# Patient Record
Sex: FEMALE
[De-identification: names, ages, dates, MRNs, and addresses within clinical notes are randomized per-mention and may not be internally consistent; named-entity substitution may affect disease eponyms.]

---

## 2019-02-03 ENCOUNTER — TRANSCRIPTION ENCOUNTER (OUTPATIENT)
Age: 69
End: 2019-02-03

## 2019-09-05 ENCOUNTER — TRANSCRIPTION ENCOUNTER (OUTPATIENT)
Age: 69
End: 2019-09-05

## 2019-10-21 PROBLEM — Z00.00 ENCOUNTER FOR PREVENTIVE HEALTH EXAMINATION: Status: ACTIVE | Noted: 2019-10-21

## 2019-10-23 ENCOUNTER — APPOINTMENT (OUTPATIENT)
Dept: UROLOGY | Facility: CLINIC | Age: 69
End: 2019-10-23
Payer: MEDICARE

## 2019-10-23 VITALS
OXYGEN SATURATION: 98 % | WEIGHT: 186 LBS | HEIGHT: 59 IN | DIASTOLIC BLOOD PRESSURE: 78 MMHG | RESPIRATION RATE: 16 BRPM | SYSTOLIC BLOOD PRESSURE: 131 MMHG | BODY MASS INDEX: 37.5 KG/M2 | HEART RATE: 82 BPM

## 2019-10-23 DIAGNOSIS — G47.33 OBSTRUCTIVE SLEEP APNEA (ADULT) (PEDIATRIC): ICD-10-CM

## 2019-10-23 DIAGNOSIS — F41.9 ANXIETY DISORDER, UNSPECIFIED: ICD-10-CM

## 2019-10-23 DIAGNOSIS — Z82.49 FAMILY HISTORY OF ISCHEMIC HEART DISEASE AND OTHER DISEASES OF THE CIRCULATORY SYSTEM: ICD-10-CM

## 2019-10-23 DIAGNOSIS — Z87.891 PERSONAL HISTORY OF NICOTINE DEPENDENCE: ICD-10-CM

## 2019-10-23 DIAGNOSIS — I25.2 OLD MYOCARDIAL INFARCTION: ICD-10-CM

## 2019-10-23 DIAGNOSIS — Z86.79 PERSONAL HISTORY OF OTHER DISEASES OF THE CIRCULATORY SYSTEM: ICD-10-CM

## 2019-10-23 DIAGNOSIS — N39.0 URINARY TRACT INFECTION, SITE NOT SPECIFIED: ICD-10-CM

## 2019-10-23 DIAGNOSIS — Z86.39 PERSONAL HISTORY OF OTHER ENDOCRINE, NUTRITIONAL AND METABOLIC DISEASE: ICD-10-CM

## 2019-10-23 DIAGNOSIS — F32.9 ANXIETY DISORDER, UNSPECIFIED: ICD-10-CM

## 2019-10-23 PROCEDURE — 81003 URINALYSIS AUTO W/O SCOPE: CPT | Mod: QW

## 2019-10-23 PROCEDURE — 99204 OFFICE O/P NEW MOD 45 MIN: CPT | Mod: 25

## 2019-10-23 RX ORDER — NITROFURANTOIN (MONOHYDRATE/MACROCRYSTALS) 25; 75 MG/1; MG/1
100 CAPSULE ORAL TWICE DAILY
Qty: 14 | Refills: 0 | Status: ACTIVE | COMMUNITY
Start: 2019-10-23 | End: 1900-01-01

## 2019-10-23 NOTE — LETTER BODY
[Dear  ___] : Dear ~MIKAELA, [Consult Letter:] : I had the pleasure of evaluating your patient, [unfilled]. [( Thank you for referring [unfilled] for consultation for _____ )] : Thank you for referring [unfilled] for consultation for [unfilled] [Please see my note below.] : Please see my note below. [Consult Closing:] : Thank you very much for allowing me to participate in the care of this patient.  If you have any questions, please do not hesitate to contact me. [Sincerely,] : Sincerely,

## 2019-10-23 NOTE — PHYSICAL EXAM
[General Appearance - Well Developed] : well developed [General Appearance - Well Nourished] : well nourished [Normal Appearance] : normal appearance [Well Groomed] : well groomed [General Appearance - In No Acute Distress] : no acute distress [Edema] : no peripheral edema [Respiration, Rhythm And Depth] : normal respiratory rhythm and effort [Exaggerated Use Of Accessory Muscles For Inspiration] : no accessory muscle use [Auscultation Breath Sounds / Voice Sounds] : lungs were clear to auscultation bilaterally [Bowel Sounds] : normal bowel sounds [Abdomen Soft] : soft [Abdomen Tenderness] : non-tender [Abdomen Mass (___ Cm)] : no abdominal mass palpated [Costovertebral Angle Tenderness] : no ~M costovertebral angle tenderness [Urethral Meatus] : normal urethra [External Female Genitalia] : normal external genitalia [Urinary Bladder Findings] : the bladder was normal on palpation [Uterine Adnexae] : normal adnexa [Anus Abnormality] : the anus and perineum were normal [Normal Station and Gait] : the gait and station were normal for the patient's age [No Focal Deficits] : no focal deficits [] : no rash [Oriented To Time, Place, And Person] : oriented to person, place, and time [Affect] : the affect was normal [Mood] : the mood was normal [No Palpable Adenopathy] : no palpable adenopathy [Not Anxious] : not anxious [FreeTextEntry1] : the vagina was stenotic with pale mucosa and loss of rugae ;a grade 1 cystourethrocele was present.

## 2019-10-23 NOTE — ASSESSMENT
[FreeTextEntry1] : #1) pyuria-asymptomatic but patient has diabetes type 2\par Today's dipstick urinalysis showed moderate leukocyte esterase. It was sent to laboratory for microscopic exam and culture.\par She was treated with Macrobid b.i.d. for one week.\par \par #2) enuresis\par This is most likely multifactorial.\par She has diabetes type 2, obstructive sleep apnea, sedated at night with Xanax and may have a UTI that aggravating.\par \par Once it is known that the urine is sterile, a PVR/uroflow study will be done to determine if she has a large post void residual which can lead to overflow incontinence at night in diabetics. This may be aggravated by her GALE and sedation at bedtime with Xanax.\par Further recommendations forthcoming after the PVR/uroflow study.\par \par #3) stress urinary incontinence: Tolerable and not a problem.\par \par She will return in 10 days for reevaluation.\par

## 2019-10-23 NOTE — REVIEW OF SYSTEMS
[Recent Weight Loss (___ Lbs)] : recent [unfilled] ~Ulb weight loss [Eyesight Problems] : eyesight problems [Vomiting] : vomiting [Constipation] : constipation [Diarrhea] : diarrhea [Joint Pain] : joint pain [Skin Lesions] : skin lesion [Itching] : itching [Difficulty Walking] : difficulty walking [Easy Bleeding] : a tendency for easy bleeding [Easy Bruising] : a tendency for easy bruising [Swollen Glands] : swollen glands

## 2019-10-23 NOTE — HISTORY OF PRESENT ILLNESS
[FreeTextEntry1] : The patient is a 69-year-old woman who was seen in the office today for the above. She stated that she does not wake up in the middle of the night to urinate but wakes up in the morning completely saturated. This has been occurring for the last 12 years. She uses a heavy pad and a pullup both daytime and nighttime twice a day. She soaks through this protection about twice a week. She does take Xanax at midnight and falls asleep about 1 AM. She also has sleep apnea but is unable to tolerate the CPAP machine. She sleeps in a recliner because she enjoys watching late-night TV and does not want to disturb her . Her daytime frequency is 5 times a day and she denies all other urological and constitutional symptomatology except for stress urinary incontinence initiate spells about a teaspoon of urine with coughing, laughing with sneezing. This started about 15 years ago and is not a problem for her. She drinks either 6 ounces of water or 8 ounces of soda is decaffeinated after dinner and has about 4 ounces of sugar-free putting each night.She has had type 2 diabetes for about 6 years.\par She saw her urologist for this problem about 10 years ago and said he prescribed medication that gave her dry mouth. She only was able to take one tablet and then stopped it. She never returned for followup.\par \par Past Urological History:\par A few UTIs\par \par Urological Family History:\par Maternal aunt? Bladder cancer\par

## 2019-10-24 LAB
APPEARANCE: ABNORMAL
BACTERIA: ABNORMAL
BILIRUB UR QL STRIP: NORMAL
BILIRUBIN URINE: NEGATIVE
BLOOD URINE: ABNORMAL
CALCIUM OXALATE CRYSTALS: ABNORMAL
CLARITY UR: NORMAL
COLLECTION METHOD: NORMAL
COLOR: YELLOW
GLUCOSE QUALITATIVE U: NEGATIVE
GLUCOSE UR-MCNC: NORMAL
HCG UR QL: 0.2 EU/DL
HGB UR QL STRIP.AUTO: NORMAL
HYALINE CASTS: 6 /LPF
KETONES UR-MCNC: NORMAL
KETONES URINE: NEGATIVE
LEUKOCYTE ESTERASE UR QL STRIP: NORMAL
LEUKOCYTE ESTERASE URINE: ABNORMAL
MICROSCOPIC-UA: NORMAL
NITRITE UR QL STRIP: NORMAL
NITRITE URINE: NEGATIVE
PH UR STRIP: 5.5
PH URINE: 5.5
PROT UR STRIP-MCNC: 100
PROTEIN URINE: ABNORMAL
RED BLOOD CELLS URINE: 18 /HPF
SP GR UR STRIP: 1.02
SPECIFIC GRAVITY URINE: 1.02
SQUAMOUS EPITHELIAL CELLS: 5 /HPF
UROBILINOGEN URINE: NORMAL
WHITE BLOOD CELLS URINE: >720 /HPF

## 2019-10-27 LAB — BACTERIA UR CULT: ABNORMAL

## 2019-11-20 ENCOUNTER — APPOINTMENT (OUTPATIENT)
Dept: UROLOGY | Facility: CLINIC | Age: 69
End: 2019-11-20
Payer: MEDICARE

## 2019-11-20 VITALS
OXYGEN SATURATION: 96 % | HEART RATE: 69 BPM | RESPIRATION RATE: 16 BRPM | DIASTOLIC BLOOD PRESSURE: 79 MMHG | SYSTOLIC BLOOD PRESSURE: 149 MMHG

## 2019-11-20 DIAGNOSIS — N39.3 STRESS INCONTINENCE (FEMALE) (MALE): ICD-10-CM

## 2019-11-20 DIAGNOSIS — N39.0 URINARY TRACT INFECTION, SITE NOT SPECIFIED: ICD-10-CM

## 2019-11-20 DIAGNOSIS — B96.20 URINARY TRACT INFECTION, SITE NOT SPECIFIED: ICD-10-CM

## 2019-11-20 PROCEDURE — 99214 OFFICE O/P EST MOD 30 MIN: CPT

## 2019-11-20 NOTE — ASSESSMENT
[FreeTextEntry1] : #1) pyuria-asymptomatic but patient has diabetes type 2\par Today's dipstick urinalysis showed moderate leukocyte esterase. It was sent to laboratory for microscopic exam and culture.\par 10/23/19 urine culture greater than 100,000 colonies of Escherichia coli sensitive to Macrobid.\par Microscopic urinalysis and urine culture sent today\par #2) enuresis\par This is most likely multifactorial.\par She has diabetes type 2, obstructive sleep apnea, sedated at night with Xanax and may have a UTI that aggravating.\par PVR/uroflow study will be done to determine if she has a large post void residual which can lead to overflow incontinence at night in diabetics. This may be aggravated by her GALE and sedation at bedtime with Xanax.\par Further recommendations forthcoming after the PVR/uroflow study.\par \par #3) stress urinary incontinence: Tolerable and not a problem.\par \par \par

## 2019-11-20 NOTE — PHYSICAL EXAM
[General Appearance - Well Developed] : well developed [General Appearance - Well Nourished] : well nourished [Normal Appearance] : normal appearance [Well Groomed] : well groomed [General Appearance - In No Acute Distress] : no acute distress [Bowel Sounds] : normal bowel sounds [Abdomen Soft] : soft [Abdomen Tenderness] : non-tender [Abdomen Mass (___ Cm)] : no abdominal mass palpated [Costovertebral Angle Tenderness] : no ~M costovertebral angle tenderness [] : no rash [Edema] : no peripheral edema [Oriented To Time, Place, And Person] : oriented to person, place, and time [Affect] : the affect was normal [Mood] : the mood was normal [Not Anxious] : not anxious [Normal Station and Gait] : the gait and station were normal for the patient's age [No Focal Deficits] : no focal deficits [FreeTextEntry1] : Morbidly obese

## 2020-01-01 ENCOUNTER — APPOINTMENT (OUTPATIENT)
Dept: UROLOGY | Facility: CLINIC | Age: 70
End: 2020-01-01
Payer: MEDICARE

## 2020-01-01 ENCOUNTER — INPATIENT (INPATIENT)
Facility: HOSPITAL | Age: 70
LOS: 9 days | DRG: 870 | End: 2020-12-22
Attending: HOSPITALIST | Admitting: HOSPITALIST
Payer: MEDICARE

## 2020-01-01 ENCOUNTER — APPOINTMENT (OUTPATIENT)
Dept: UROLOGY | Facility: CLINIC | Age: 70
End: 2020-01-01

## 2020-01-01 VITALS — WEIGHT: 199.96 LBS | HEIGHT: 63 IN

## 2020-01-01 VITALS — DIASTOLIC BLOOD PRESSURE: 80 MMHG | RESPIRATION RATE: 28 BRPM | HEART RATE: 71 BPM | SYSTOLIC BLOOD PRESSURE: 96 MMHG

## 2020-01-01 DIAGNOSIS — N39.44 NOCTURNAL ENURESIS: ICD-10-CM

## 2020-01-01 DIAGNOSIS — J12.9 VIRAL PNEUMONIA, UNSPECIFIED: ICD-10-CM

## 2020-01-01 LAB
-  AMIKACIN: SIGNIFICANT CHANGE UP
-  AMOXICILLIN/CLAVULANIC ACID: SIGNIFICANT CHANGE UP
-  AMPICILLIN/SULBACTAM: SIGNIFICANT CHANGE UP
-  AMPICILLIN/SULBACTAM: SIGNIFICANT CHANGE UP
-  AMPICILLIN: SIGNIFICANT CHANGE UP
-  AZTREONAM: SIGNIFICANT CHANGE UP
-  CEFAZOLIN: SIGNIFICANT CHANGE UP
-  CEFAZOLIN: SIGNIFICANT CHANGE UP
-  CEFEPIME: SIGNIFICANT CHANGE UP
-  CEFOXITIN: SIGNIFICANT CHANGE UP
-  CEFTRIAXONE: SIGNIFICANT CHANGE UP
-  CIPROFLOXACIN: SIGNIFICANT CHANGE UP
-  CLINDAMYCIN: SIGNIFICANT CHANGE UP
-  COAGULASE NEGATIVE STAPHYLOCOCCUS: SIGNIFICANT CHANGE UP
-  ERTAPENEM: SIGNIFICANT CHANGE UP
-  ERYTHROMYCIN: SIGNIFICANT CHANGE UP
-  GENTAMICIN: SIGNIFICANT CHANGE UP
-  GENTAMICIN: SIGNIFICANT CHANGE UP
-  IMIPENEM: SIGNIFICANT CHANGE UP
-  LEVOFLOXACIN: SIGNIFICANT CHANGE UP
-  MEROPENEM: SIGNIFICANT CHANGE UP
-  NITROFURANTOIN: SIGNIFICANT CHANGE UP
-  OXACILLIN: SIGNIFICANT CHANGE UP
-  PIPERACILLIN/TAZOBACTAM: SIGNIFICANT CHANGE UP
-  RIFAMPIN: SIGNIFICANT CHANGE UP
-  TETRACYCLINE: SIGNIFICANT CHANGE UP
-  TIGECYCLINE: SIGNIFICANT CHANGE UP
-  TOBRAMYCIN: SIGNIFICANT CHANGE UP
-  TRIMETHOPRIM/SULFAMETHOXAZOLE: SIGNIFICANT CHANGE UP
-  TRIMETHOPRIM/SULFAMETHOXAZOLE: SIGNIFICANT CHANGE UP
-  VANCOMYCIN: SIGNIFICANT CHANGE UP
A1C WITH ESTIMATED AVERAGE GLUCOSE RESULT: 8.8 % — HIGH (ref 4–5.6)
ALBUMIN SERPL ELPH-MCNC: 1.5 G/DL — LOW (ref 3.3–5)
ALBUMIN SERPL ELPH-MCNC: 2.5 G/DL — LOW (ref 3.3–5)
ALBUMIN SERPL ELPH-MCNC: 2.6 G/DL — LOW (ref 3.3–5)
ALBUMIN SERPL ELPH-MCNC: 3.1 G/DL — LOW (ref 3.3–5)
ALP SERPL-CCNC: 119 U/L — SIGNIFICANT CHANGE UP (ref 40–120)
ALP SERPL-CCNC: 24 U/L — LOW (ref 40–120)
ALP SERPL-CCNC: 29 U/L — LOW (ref 40–120)
ALP SERPL-CCNC: 33 U/L — LOW (ref 40–120)
ALT FLD-CCNC: 28 U/L — SIGNIFICANT CHANGE UP (ref 12–78)
ALT FLD-CCNC: 29 U/L — SIGNIFICANT CHANGE UP (ref 12–78)
ALT FLD-CCNC: 34 U/L — SIGNIFICANT CHANGE UP (ref 12–78)
ALT FLD-CCNC: 69 U/L — SIGNIFICANT CHANGE UP (ref 12–78)
ANION GAP SERPL CALC-SCNC: 11 MMOL/L — SIGNIFICANT CHANGE UP (ref 5–17)
ANION GAP SERPL CALC-SCNC: 14 MMOL/L — SIGNIFICANT CHANGE UP (ref 5–17)
ANION GAP SERPL CALC-SCNC: 18 MMOL/L — HIGH (ref 5–17)
ANION GAP SERPL CALC-SCNC: 6 MMOL/L — SIGNIFICANT CHANGE UP (ref 5–17)
ANION GAP SERPL CALC-SCNC: 7 MMOL/L — SIGNIFICANT CHANGE UP (ref 5–17)
ANION GAP SERPL CALC-SCNC: 8 MMOL/L — SIGNIFICANT CHANGE UP (ref 5–17)
ANION GAP SERPL CALC-SCNC: 9 MMOL/L — SIGNIFICANT CHANGE UP (ref 5–17)
APPEARANCE UR: ABNORMAL
APTT BLD: 31.1 SEC — SIGNIFICANT CHANGE UP (ref 27.5–35.5)
AST SERPL-CCNC: 36 U/L — SIGNIFICANT CHANGE UP (ref 15–37)
AST SERPL-CCNC: 39 U/L — HIGH (ref 15–37)
AST SERPL-CCNC: 42 U/L — HIGH (ref 15–37)
AST SERPL-CCNC: 71 U/L — HIGH (ref 15–37)
BASE EXCESS BLDA CALC-SCNC: -0.6 MMOL/L — SIGNIFICANT CHANGE UP (ref -2–2)
BASE EXCESS BLDA CALC-SCNC: -1.5 MMOL/L — SIGNIFICANT CHANGE UP (ref -2–2)
BASE EXCESS BLDA CALC-SCNC: -14.1 MMOL/L — LOW (ref -2–2)
BASE EXCESS BLDA CALC-SCNC: -2.4 MMOL/L — LOW (ref -2–2)
BASE EXCESS BLDA CALC-SCNC: -5.5 MMOL/L — LOW (ref -2–2)
BASE EXCESS BLDA CALC-SCNC: 0.1 MMOL/L — SIGNIFICANT CHANGE UP (ref -2–2)
BASE EXCESS BLDA CALC-SCNC: 1 MMOL/L — SIGNIFICANT CHANGE UP (ref -2–2)
BASE EXCESS BLDA CALC-SCNC: 1.9 MMOL/L — SIGNIFICANT CHANGE UP (ref -2–2)
BASE EXCESS BLDA CALC-SCNC: 1.9 MMOL/L — SIGNIFICANT CHANGE UP (ref -2–2)
BASE EXCESS BLDA CALC-SCNC: 2 MMOL/L — SIGNIFICANT CHANGE UP (ref -2–2)
BASE EXCESS BLDA CALC-SCNC: 2.8 MMOL/L — HIGH (ref -2–2)
BASOPHILS # BLD AUTO: 0.02 K/UL — SIGNIFICANT CHANGE UP (ref 0–0.2)
BASOPHILS NFR BLD AUTO: 0.3 % — SIGNIFICANT CHANGE UP (ref 0–2)
BILIRUB DIRECT SERPL-MCNC: 0.1 MG/DL — SIGNIFICANT CHANGE UP (ref 0–0.2)
BILIRUB DIRECT SERPL-MCNC: 0.6 MG/DL — HIGH (ref 0–0.2)
BILIRUB INDIRECT FLD-MCNC: 0.1 MG/DL — LOW (ref 0.2–1)
BILIRUB INDIRECT FLD-MCNC: 0.3 MG/DL — SIGNIFICANT CHANGE UP (ref 0.2–1)
BILIRUB SERPL-MCNC: 0.4 MG/DL — SIGNIFICANT CHANGE UP (ref 0.2–1.2)
BILIRUB SERPL-MCNC: 0.4 MG/DL — SIGNIFICANT CHANGE UP (ref 0.2–1.2)
BILIRUB SERPL-MCNC: 0.6 MG/DL — SIGNIFICANT CHANGE UP (ref 0.2–1.2)
BILIRUB SERPL-MCNC: 0.7 MG/DL — SIGNIFICANT CHANGE UP (ref 0.2–1.2)
BILIRUB UR-MCNC: NEGATIVE — SIGNIFICANT CHANGE UP
BLOOD GAS COMMENTS ARTERIAL: SIGNIFICANT CHANGE UP
BUN SERPL-MCNC: 113 MG/DL — HIGH (ref 7–23)
BUN SERPL-MCNC: 15 MG/DL — SIGNIFICANT CHANGE UP (ref 7–23)
BUN SERPL-MCNC: 15 MG/DL — SIGNIFICANT CHANGE UP (ref 7–23)
BUN SERPL-MCNC: 20 MG/DL — SIGNIFICANT CHANGE UP (ref 7–23)
BUN SERPL-MCNC: 28 MG/DL — HIGH (ref 7–23)
BUN SERPL-MCNC: 41 MG/DL — HIGH (ref 7–23)
BUN SERPL-MCNC: 42 MG/DL — HIGH (ref 7–23)
BUN SERPL-MCNC: 53 MG/DL — HIGH (ref 7–23)
BUN SERPL-MCNC: 82 MG/DL — HIGH (ref 7–23)
BUN SERPL-MCNC: 87 MG/DL — HIGH (ref 7–23)
BUN SERPL-MCNC: 97 MG/DL — HIGH (ref 7–23)
CALCIUM SERPL-MCNC: 7.9 MG/DL — LOW (ref 8.5–10.1)
CALCIUM SERPL-MCNC: 8.2 MG/DL — LOW (ref 8.5–10.1)
CALCIUM SERPL-MCNC: 8.2 MG/DL — LOW (ref 8.5–10.1)
CALCIUM SERPL-MCNC: 8.3 MG/DL — LOW (ref 8.5–10.1)
CALCIUM SERPL-MCNC: 8.4 MG/DL — LOW (ref 8.5–10.1)
CALCIUM SERPL-MCNC: 8.5 MG/DL — SIGNIFICANT CHANGE UP (ref 8.5–10.1)
CALCIUM SERPL-MCNC: 8.6 MG/DL — SIGNIFICANT CHANGE UP (ref 8.5–10.1)
CALCIUM SERPL-MCNC: 8.7 MG/DL — SIGNIFICANT CHANGE UP (ref 8.5–10.1)
CALCIUM SERPL-MCNC: 8.8 MG/DL — SIGNIFICANT CHANGE UP (ref 8.5–10.1)
CALCIUM SERPL-MCNC: 8.9 MG/DL — SIGNIFICANT CHANGE UP (ref 8.5–10.1)
CALCIUM SERPL-MCNC: 9 MG/DL — SIGNIFICANT CHANGE UP (ref 8.5–10.1)
CHLORIDE SERPL-SCNC: 100 MMOL/L — SIGNIFICANT CHANGE UP (ref 96–108)
CHLORIDE SERPL-SCNC: 102 MMOL/L — SIGNIFICANT CHANGE UP (ref 96–108)
CHLORIDE SERPL-SCNC: 102 MMOL/L — SIGNIFICANT CHANGE UP (ref 96–108)
CHLORIDE SERPL-SCNC: 103 MMOL/L — SIGNIFICANT CHANGE UP (ref 96–108)
CHLORIDE SERPL-SCNC: 103 MMOL/L — SIGNIFICANT CHANGE UP (ref 96–108)
CHLORIDE SERPL-SCNC: 104 MMOL/L — SIGNIFICANT CHANGE UP (ref 96–108)
CHLORIDE SERPL-SCNC: 106 MMOL/L — SIGNIFICANT CHANGE UP (ref 96–108)
CHLORIDE SERPL-SCNC: 91 MMOL/L — LOW (ref 96–108)
CHLORIDE SERPL-SCNC: 91 MMOL/L — LOW (ref 96–108)
CHLORIDE SERPL-SCNC: 96 MMOL/L — SIGNIFICANT CHANGE UP (ref 96–108)
CHLORIDE SERPL-SCNC: 96 MMOL/L — SIGNIFICANT CHANGE UP (ref 96–108)
CO2 SERPL-SCNC: 16 MMOL/L — LOW (ref 22–31)
CO2 SERPL-SCNC: 24 MMOL/L — SIGNIFICANT CHANGE UP (ref 22–31)
CO2 SERPL-SCNC: 26 MMOL/L — SIGNIFICANT CHANGE UP (ref 22–31)
CO2 SERPL-SCNC: 27 MMOL/L — SIGNIFICANT CHANGE UP (ref 22–31)
CO2 SERPL-SCNC: 28 MMOL/L — SIGNIFICANT CHANGE UP (ref 22–31)
CO2 SERPL-SCNC: 29 MMOL/L — SIGNIFICANT CHANGE UP (ref 22–31)
CO2 SERPL-SCNC: 30 MMOL/L — SIGNIFICANT CHANGE UP (ref 22–31)
CO2 SERPL-SCNC: 31 MMOL/L — SIGNIFICANT CHANGE UP (ref 22–31)
COLOR SPEC: YELLOW — SIGNIFICANT CHANGE UP
CREAT SERPL-MCNC: 0.64 MG/DL — SIGNIFICANT CHANGE UP (ref 0.5–1.3)
CREAT SERPL-MCNC: 0.69 MG/DL — SIGNIFICANT CHANGE UP (ref 0.5–1.3)
CREAT SERPL-MCNC: 0.77 MG/DL — SIGNIFICANT CHANGE UP (ref 0.5–1.3)
CREAT SERPL-MCNC: 0.85 MG/DL — SIGNIFICANT CHANGE UP (ref 0.5–1.3)
CREAT SERPL-MCNC: 0.89 MG/DL — SIGNIFICANT CHANGE UP (ref 0.5–1.3)
CREAT SERPL-MCNC: 0.96 MG/DL — SIGNIFICANT CHANGE UP (ref 0.5–1.3)
CREAT SERPL-MCNC: 0.98 MG/DL — SIGNIFICANT CHANGE UP (ref 0.5–1.3)
CREAT SERPL-MCNC: 0.99 MG/DL — SIGNIFICANT CHANGE UP (ref 0.5–1.3)
CREAT SERPL-MCNC: 1.89 MG/DL — HIGH (ref 0.5–1.3)
CREAT SERPL-MCNC: 2.74 MG/DL — HIGH (ref 0.5–1.3)
CREAT SERPL-MCNC: 3.93 MG/DL — HIGH (ref 0.5–1.3)
CREAT SERPL-MCNC: 4.02 MG/DL — HIGH (ref 0.5–1.3)
CRP SERPL-MCNC: 15.7 MG/DL — HIGH (ref 0–0.4)
CRP SERPL-MCNC: 34.51 MG/DL — HIGH (ref 0–0.4)
CULTURE RESULTS: NO GROWTH — SIGNIFICANT CHANGE UP
CULTURE RESULTS: SIGNIFICANT CHANGE UP
D DIMER BLD IA.RAPID-MCNC: 1799 NG/ML DDU — HIGH
D DIMER BLD IA.RAPID-MCNC: 303 NG/ML DDU — HIGH
DIFF PNL FLD: ABNORMAL
EOSINOPHIL # BLD AUTO: 0 K/UL — SIGNIFICANT CHANGE UP (ref 0–0.5)
EOSINOPHIL NFR BLD AUTO: 0 % — SIGNIFICANT CHANGE UP (ref 0–6)
ESTIMATED AVERAGE GLUCOSE: 206 MG/DL — HIGH (ref 68–114)
FERRITIN SERPL-MCNC: 1156 NG/ML — HIGH (ref 15–150)
FERRITIN SERPL-MCNC: 521 NG/ML — HIGH (ref 15–150)
FERRITIN SERPL-MCNC: 529 NG/ML — HIGH (ref 15–150)
FIBRINOGEN PPP-MCNC: 862 MG/DL — HIGH (ref 290–520)
GAS PNL BLDA: SIGNIFICANT CHANGE UP
GLUCOSE SERPL-MCNC: 123 MG/DL — HIGH (ref 70–99)
GLUCOSE SERPL-MCNC: 157 MG/DL — HIGH (ref 70–99)
GLUCOSE SERPL-MCNC: 220 MG/DL — HIGH (ref 70–99)
GLUCOSE SERPL-MCNC: 246 MG/DL — HIGH (ref 70–99)
GLUCOSE SERPL-MCNC: 251 MG/DL — HIGH (ref 70–99)
GLUCOSE SERPL-MCNC: 263 MG/DL — HIGH (ref 70–99)
GLUCOSE SERPL-MCNC: 263 MG/DL — HIGH (ref 70–99)
GLUCOSE SERPL-MCNC: 275 MG/DL — HIGH (ref 70–99)
GLUCOSE SERPL-MCNC: 307 MG/DL — HIGH (ref 70–99)
GLUCOSE SERPL-MCNC: 74 MG/DL — SIGNIFICANT CHANGE UP (ref 70–99)
GLUCOSE SERPL-MCNC: 83 MG/DL — SIGNIFICANT CHANGE UP (ref 70–99)
GLUCOSE UR QL: NEGATIVE MG/DL — SIGNIFICANT CHANGE UP
GRAM STN FLD: SIGNIFICANT CHANGE UP
HAV IGM SER-ACNC: SIGNIFICANT CHANGE UP
HBV CORE IGM SER-ACNC: SIGNIFICANT CHANGE UP
HBV SURFACE AG SER-ACNC: SIGNIFICANT CHANGE UP
HCO3 BLDA-SCNC: 16 MMOL/L — LOW (ref 21–29)
HCO3 BLDA-SCNC: 22 MMOL/L — SIGNIFICANT CHANGE UP (ref 21–29)
HCO3 BLDA-SCNC: 23 MMOL/L — SIGNIFICANT CHANGE UP (ref 21–29)
HCO3 BLDA-SCNC: 25 MMOL/L — SIGNIFICANT CHANGE UP (ref 21–29)
HCO3 BLDA-SCNC: 26 MMOL/L — SIGNIFICANT CHANGE UP (ref 21–29)
HCO3 BLDA-SCNC: 26 MMOL/L — SIGNIFICANT CHANGE UP (ref 21–29)
HCO3 BLDA-SCNC: 27 MMOL/L — SIGNIFICANT CHANGE UP (ref 21–29)
HCO3 BLDA-SCNC: 27 MMOL/L — SIGNIFICANT CHANGE UP (ref 21–29)
HCO3 BLDA-SCNC: 28 MMOL/L — SIGNIFICANT CHANGE UP (ref 21–29)
HCO3 BLDA-SCNC: 28 MMOL/L — SIGNIFICANT CHANGE UP (ref 21–29)
HCO3 BLDA-SCNC: 29 MMOL/L — SIGNIFICANT CHANGE UP (ref 21–29)
HCT VFR BLD CALC: 34.6 % — SIGNIFICANT CHANGE UP (ref 34.5–45)
HCT VFR BLD CALC: 36 % — SIGNIFICANT CHANGE UP (ref 34.5–45)
HCT VFR BLD CALC: 36.8 % — SIGNIFICANT CHANGE UP (ref 34.5–45)
HCT VFR BLD CALC: 38 % — SIGNIFICANT CHANGE UP (ref 34.5–45)
HCT VFR BLD CALC: 38.6 % — SIGNIFICANT CHANGE UP (ref 34.5–45)
HCT VFR BLD CALC: 38.7 % — SIGNIFICANT CHANGE UP (ref 34.5–45)
HCT VFR BLD CALC: 39.3 % — SIGNIFICANT CHANGE UP (ref 34.5–45)
HCT VFR BLD CALC: 39.8 % — SIGNIFICANT CHANGE UP (ref 34.5–45)
HCT VFR BLD CALC: 40.3 % — SIGNIFICANT CHANGE UP (ref 34.5–45)
HCT VFR BLD CALC: 41.2 % — SIGNIFICANT CHANGE UP (ref 34.5–45)
HCT VFR BLD CALC: 46.1 % — HIGH (ref 34.5–45)
HCV AB S/CO SERPL IA: 0.1 S/CO — SIGNIFICANT CHANGE UP (ref 0–0.99)
HCV AB S/CO SERPL IA: 0.14 S/CO — SIGNIFICANT CHANGE UP (ref 0–0.99)
HCV AB SERPL-IMP: SIGNIFICANT CHANGE UP
HCV AB SERPL-IMP: SIGNIFICANT CHANGE UP
HGB BLD-MCNC: 10.7 G/DL — LOW (ref 11.5–15.5)
HGB BLD-MCNC: 10.9 G/DL — LOW (ref 11.5–15.5)
HGB BLD-MCNC: 11.1 G/DL — LOW (ref 11.5–15.5)
HGB BLD-MCNC: 11.8 G/DL — SIGNIFICANT CHANGE UP (ref 11.5–15.5)
HGB BLD-MCNC: 12.2 G/DL — SIGNIFICANT CHANGE UP (ref 11.5–15.5)
HGB BLD-MCNC: 12.3 G/DL — SIGNIFICANT CHANGE UP (ref 11.5–15.5)
HGB BLD-MCNC: 12.5 G/DL — SIGNIFICANT CHANGE UP (ref 11.5–15.5)
HGB BLD-MCNC: 12.6 G/DL — SIGNIFICANT CHANGE UP (ref 11.5–15.5)
HGB BLD-MCNC: 13.1 G/DL — SIGNIFICANT CHANGE UP (ref 11.5–15.5)
HGB BLD-MCNC: 13.2 G/DL — SIGNIFICANT CHANGE UP (ref 11.5–15.5)
HGB BLD-MCNC: 14.7 G/DL — SIGNIFICANT CHANGE UP (ref 11.5–15.5)
IMM GRANULOCYTES NFR BLD AUTO: 0.6 % — SIGNIFICANT CHANGE UP (ref 0–1.5)
INR BLD: 1.25 RATIO — HIGH (ref 0.88–1.16)
KETONES UR-MCNC: ABNORMAL
LACTATE SERPL-SCNC: 1 MMOL/L — SIGNIFICANT CHANGE UP (ref 0.7–2)
LACTATE SERPL-SCNC: 1.6 MMOL/L — SIGNIFICANT CHANGE UP (ref 0.7–2)
LDH SERPL L TO P-CCNC: 385 U/L — HIGH (ref 84–241)
LEUKOCYTE ESTERASE UR-ACNC: ABNORMAL
LYMPHOCYTES # BLD AUTO: 1.4 K/UL — SIGNIFICANT CHANGE UP (ref 1–3.3)
LYMPHOCYTES # BLD AUTO: 21.6 % — SIGNIFICANT CHANGE UP (ref 13–44)
MAGNESIUM SERPL-MCNC: 2.2 MG/DL — SIGNIFICANT CHANGE UP (ref 1.6–2.6)
MAGNESIUM SERPL-MCNC: 2.3 MG/DL — SIGNIFICANT CHANGE UP (ref 1.6–2.6)
MAGNESIUM SERPL-MCNC: 2.4 MG/DL — SIGNIFICANT CHANGE UP (ref 1.6–2.6)
MAGNESIUM SERPL-MCNC: 2.4 MG/DL — SIGNIFICANT CHANGE UP (ref 1.6–2.6)
MAGNESIUM SERPL-MCNC: 2.9 MG/DL — HIGH (ref 1.6–2.6)
MAGNESIUM SERPL-MCNC: 2.9 MG/DL — HIGH (ref 1.6–2.6)
MAGNESIUM SERPL-MCNC: 3.1 MG/DL — HIGH (ref 1.6–2.6)
MAGNESIUM SERPL-MCNC: 3.1 MG/DL — HIGH (ref 1.6–2.6)
MAGNESIUM SERPL-MCNC: 3.2 MG/DL — HIGH (ref 1.6–2.6)
MAGNESIUM SERPL-MCNC: 3.4 MG/DL — HIGH (ref 1.6–2.6)
MCHC RBC-ENTMCNC: 26.9 PG — LOW (ref 27–34)
MCHC RBC-ENTMCNC: 27.2 PG — SIGNIFICANT CHANGE UP (ref 27–34)
MCHC RBC-ENTMCNC: 27.2 PG — SIGNIFICANT CHANGE UP (ref 27–34)
MCHC RBC-ENTMCNC: 27.3 PG — SIGNIFICANT CHANGE UP (ref 27–34)
MCHC RBC-ENTMCNC: 27.4 PG — SIGNIFICANT CHANGE UP (ref 27–34)
MCHC RBC-ENTMCNC: 27.5 PG — SIGNIFICANT CHANGE UP (ref 27–34)
MCHC RBC-ENTMCNC: 27.6 PG — SIGNIFICANT CHANGE UP (ref 27–34)
MCHC RBC-ENTMCNC: 27.8 PG — SIGNIFICANT CHANGE UP (ref 27–34)
MCHC RBC-ENTMCNC: 30.2 GM/DL — LOW (ref 32–36)
MCHC RBC-ENTMCNC: 30.3 GM/DL — LOW (ref 32–36)
MCHC RBC-ENTMCNC: 30.6 GM/DL — LOW (ref 32–36)
MCHC RBC-ENTMCNC: 30.7 GM/DL — LOW (ref 32–36)
MCHC RBC-ENTMCNC: 30.9 GM/DL — LOW (ref 32–36)
MCHC RBC-ENTMCNC: 31.8 GM/DL — LOW (ref 32–36)
MCHC RBC-ENTMCNC: 31.9 GM/DL — LOW (ref 32–36)
MCHC RBC-ENTMCNC: 32 GM/DL — SIGNIFICANT CHANGE UP (ref 32–36)
MCHC RBC-ENTMCNC: 32.4 GM/DL — SIGNIFICANT CHANGE UP (ref 32–36)
MCHC RBC-ENTMCNC: 32.5 GM/DL — SIGNIFICANT CHANGE UP (ref 32–36)
MCHC RBC-ENTMCNC: 32.6 GM/DL — SIGNIFICANT CHANGE UP (ref 32–36)
MCV RBC AUTO: 84.3 FL — SIGNIFICANT CHANGE UP (ref 80–100)
MCV RBC AUTO: 84.5 FL — SIGNIFICANT CHANGE UP (ref 80–100)
MCV RBC AUTO: 84.9 FL — SIGNIFICANT CHANGE UP (ref 80–100)
MCV RBC AUTO: 85.7 FL — SIGNIFICANT CHANGE UP (ref 80–100)
MCV RBC AUTO: 85.8 FL — SIGNIFICANT CHANGE UP (ref 80–100)
MCV RBC AUTO: 86.6 FL — SIGNIFICANT CHANGE UP (ref 80–100)
MCV RBC AUTO: 88.7 FL — SIGNIFICANT CHANGE UP (ref 80–100)
MCV RBC AUTO: 88.9 FL — SIGNIFICANT CHANGE UP (ref 80–100)
MCV RBC AUTO: 89.1 FL — SIGNIFICANT CHANGE UP (ref 80–100)
MCV RBC AUTO: 89.6 FL — SIGNIFICANT CHANGE UP (ref 80–100)
MCV RBC AUTO: 91.1 FL — SIGNIFICANT CHANGE UP (ref 80–100)
METHOD TYPE: SIGNIFICANT CHANGE UP
MONOCYTES # BLD AUTO: 0.87 K/UL — SIGNIFICANT CHANGE UP (ref 0–0.9)
MONOCYTES NFR BLD AUTO: 13.4 % — SIGNIFICANT CHANGE UP (ref 2–14)
NEUTROPHILS # BLD AUTO: 4.16 K/UL — SIGNIFICANT CHANGE UP (ref 1.8–7.4)
NEUTROPHILS NFR BLD AUTO: 64.1 % — SIGNIFICANT CHANGE UP (ref 43–77)
NITRITE UR-MCNC: POSITIVE
ORGANISM # SPEC MICROSCOPIC CNT: SIGNIFICANT CHANGE UP
PCO2 BLDA: 38 MMHG — SIGNIFICANT CHANGE UP (ref 32–46)
PCO2 BLDA: 44 MMHG — SIGNIFICANT CHANGE UP (ref 32–46)
PCO2 BLDA: 45 MMHG — SIGNIFICANT CHANGE UP (ref 32–46)
PCO2 BLDA: 48 MMHG — HIGH (ref 32–46)
PCO2 BLDA: 50 MMHG — HIGH (ref 32–46)
PCO2 BLDA: 52 MMHG — HIGH (ref 32–46)
PCO2 BLDA: 53 MMHG — HIGH (ref 32–46)
PCO2 BLDA: 55 MMHG — HIGH (ref 32–46)
PCO2 BLDA: 59 MMHG — HIGH (ref 32–46)
PCO2 BLDA: 60 MMHG — HIGH (ref 32–46)
PCO2 BLDA: 67 MMHG — HIGH (ref 32–46)
PH BLDA: 7.08 — CRITICAL LOW (ref 7.35–7.45)
PH BLDA: 7.2 — CRITICAL LOW (ref 7.35–7.45)
PH BLDA: 7.24 — LOW (ref 7.35–7.45)
PH BLDA: 7.25 — LOW (ref 7.35–7.45)
PH BLDA: 7.35 — SIGNIFICANT CHANGE UP (ref 7.35–7.45)
PH BLDA: 7.39 — SIGNIFICANT CHANGE UP (ref 7.35–7.45)
PH BLDA: 7.39 — SIGNIFICANT CHANGE UP (ref 7.35–7.45)
PH BLDA: 7.4 — SIGNIFICANT CHANGE UP (ref 7.35–7.45)
PH UR: 6 — SIGNIFICANT CHANGE UP (ref 5–8)
PHOSPHATE SERPL-MCNC: 10.9 MG/DL — HIGH (ref 2.5–4.5)
PHOSPHATE SERPL-MCNC: 13.7 MG/DL — HIGH (ref 2.5–4.5)
PHOSPHATE SERPL-MCNC: 3.5 MG/DL — SIGNIFICANT CHANGE UP (ref 2.5–4.5)
PHOSPHATE SERPL-MCNC: 3.6 MG/DL — SIGNIFICANT CHANGE UP (ref 2.5–4.5)
PHOSPHATE SERPL-MCNC: 4.5 MG/DL — SIGNIFICANT CHANGE UP (ref 2.5–4.5)
PHOSPHATE SERPL-MCNC: 4.6 MG/DL — HIGH (ref 2.5–4.5)
PHOSPHATE SERPL-MCNC: 4.6 MG/DL — HIGH (ref 2.5–4.5)
PHOSPHATE SERPL-MCNC: 6.9 MG/DL — HIGH (ref 2.5–4.5)
PHOSPHATE SERPL-MCNC: 8.7 MG/DL — HIGH (ref 2.5–4.5)
PLATELET # BLD AUTO: 173 K/UL — SIGNIFICANT CHANGE UP (ref 150–400)
PLATELET # BLD AUTO: 184 K/UL — SIGNIFICANT CHANGE UP (ref 150–400)
PLATELET # BLD AUTO: 199 K/UL — SIGNIFICANT CHANGE UP (ref 150–400)
PLATELET # BLD AUTO: 235 K/UL — SIGNIFICANT CHANGE UP (ref 150–400)
PLATELET # BLD AUTO: 266 K/UL — SIGNIFICANT CHANGE UP (ref 150–400)
PLATELET # BLD AUTO: 312 K/UL — SIGNIFICANT CHANGE UP (ref 150–400)
PLATELET # BLD AUTO: 324 K/UL — SIGNIFICANT CHANGE UP (ref 150–400)
PLATELET # BLD AUTO: 377 K/UL — SIGNIFICANT CHANGE UP (ref 150–400)
PLATELET # BLD AUTO: 429 K/UL — HIGH (ref 150–400)
PLATELET # BLD AUTO: 444 K/UL — HIGH (ref 150–400)
PLATELET # BLD AUTO: 502 K/UL — HIGH (ref 150–400)
PO2 BLDA: 135 MMHG — HIGH (ref 74–108)
PO2 BLDA: 151 MMHG — HIGH (ref 74–108)
PO2 BLDA: 52 MMHG — LOW (ref 74–108)
PO2 BLDA: 55 MMHG — LOW (ref 74–108)
PO2 BLDA: 55 MMHG — LOW (ref 74–108)
PO2 BLDA: 56 MMHG — LOW (ref 74–108)
PO2 BLDA: 60 MMHG — LOW (ref 74–108)
PO2 BLDA: 61 MMHG — LOW (ref 74–108)
PO2 BLDA: 63 MMHG — LOW (ref 74–108)
PO2 BLDA: 70 MMHG — LOW (ref 74–108)
PO2 BLDA: 92 MMHG — SIGNIFICANT CHANGE UP (ref 74–108)
POTASSIUM SERPL-MCNC: 3 MMOL/L — LOW (ref 3.5–5.3)
POTASSIUM SERPL-MCNC: 3.3 MMOL/L — LOW (ref 3.5–5.3)
POTASSIUM SERPL-MCNC: 3.5 MMOL/L — SIGNIFICANT CHANGE UP (ref 3.5–5.3)
POTASSIUM SERPL-MCNC: 3.6 MMOL/L — SIGNIFICANT CHANGE UP (ref 3.5–5.3)
POTASSIUM SERPL-MCNC: 3.7 MMOL/L — SIGNIFICANT CHANGE UP (ref 3.5–5.3)
POTASSIUM SERPL-MCNC: 3.7 MMOL/L — SIGNIFICANT CHANGE UP (ref 3.5–5.3)
POTASSIUM SERPL-MCNC: 4.3 MMOL/L — SIGNIFICANT CHANGE UP (ref 3.5–5.3)
POTASSIUM SERPL-MCNC: 4.9 MMOL/L — SIGNIFICANT CHANGE UP (ref 3.5–5.3)
POTASSIUM SERPL-MCNC: 4.9 MMOL/L — SIGNIFICANT CHANGE UP (ref 3.5–5.3)
POTASSIUM SERPL-MCNC: 5.7 MMOL/L — HIGH (ref 3.5–5.3)
POTASSIUM SERPL-MCNC: 6.5 MMOL/L — CRITICAL HIGH (ref 3.5–5.3)
POTASSIUM SERPL-SCNC: 3 MMOL/L — LOW (ref 3.5–5.3)
POTASSIUM SERPL-SCNC: 3.3 MMOL/L — LOW (ref 3.5–5.3)
POTASSIUM SERPL-SCNC: 3.5 MMOL/L — SIGNIFICANT CHANGE UP (ref 3.5–5.3)
POTASSIUM SERPL-SCNC: 3.6 MMOL/L — SIGNIFICANT CHANGE UP (ref 3.5–5.3)
POTASSIUM SERPL-SCNC: 3.7 MMOL/L — SIGNIFICANT CHANGE UP (ref 3.5–5.3)
POTASSIUM SERPL-SCNC: 3.7 MMOL/L — SIGNIFICANT CHANGE UP (ref 3.5–5.3)
POTASSIUM SERPL-SCNC: 4.3 MMOL/L — SIGNIFICANT CHANGE UP (ref 3.5–5.3)
POTASSIUM SERPL-SCNC: 4.9 MMOL/L — SIGNIFICANT CHANGE UP (ref 3.5–5.3)
POTASSIUM SERPL-SCNC: 4.9 MMOL/L — SIGNIFICANT CHANGE UP (ref 3.5–5.3)
POTASSIUM SERPL-SCNC: 5.7 MMOL/L — HIGH (ref 3.5–5.3)
POTASSIUM SERPL-SCNC: 6.5 MMOL/L — CRITICAL HIGH (ref 3.5–5.3)
PROCALCITONIN SERPL-MCNC: 0.2 NG/ML — HIGH (ref 0.02–0.1)
PROCALCITONIN SERPL-MCNC: 0.22 NG/ML — HIGH (ref 0.02–0.1)
PROT SERPL-MCNC: 6.6 GM/DL — SIGNIFICANT CHANGE UP (ref 6–8.3)
PROT SERPL-MCNC: 6.8 GM/DL — SIGNIFICANT CHANGE UP (ref 6–8.3)
PROT SERPL-MCNC: 6.9 GM/DL — SIGNIFICANT CHANGE UP (ref 6–8.3)
PROT SERPL-MCNC: 7.9 GM/DL — SIGNIFICANT CHANGE UP (ref 6–8.3)
PROT UR-MCNC: 500 MG/DL
PROTHROM AB SERPL-ACNC: 14.5 SEC — HIGH (ref 10.6–13.6)
RBC # BLD: 3.9 M/UL — SIGNIFICANT CHANGE UP (ref 3.8–5.2)
RBC # BLD: 3.95 M/UL — SIGNIFICANT CHANGE UP (ref 3.8–5.2)
RBC # BLD: 4.13 M/UL — SIGNIFICANT CHANGE UP (ref 3.8–5.2)
RBC # BLD: 4.34 M/UL — SIGNIFICANT CHANGE UP (ref 3.8–5.2)
RBC # BLD: 4.43 M/UL — SIGNIFICANT CHANGE UP (ref 3.8–5.2)
RBC # BLD: 4.44 M/UL — SIGNIFICANT CHANGE UP (ref 3.8–5.2)
RBC # BLD: 4.54 M/UL — SIGNIFICANT CHANGE UP (ref 3.8–5.2)
RBC # BLD: 4.59 M/UL — SIGNIFICANT CHANGE UP (ref 3.8–5.2)
RBC # BLD: 4.77 M/UL — SIGNIFICANT CHANGE UP (ref 3.8–5.2)
RBC # BLD: 4.85 M/UL — SIGNIFICANT CHANGE UP (ref 3.8–5.2)
RBC # BLD: 5.38 M/UL — HIGH (ref 3.8–5.2)
RBC # FLD: 13.9 % — SIGNIFICANT CHANGE UP (ref 10.3–14.5)
RBC # FLD: 14.1 % — SIGNIFICANT CHANGE UP (ref 10.3–14.5)
RBC # FLD: 14.2 % — SIGNIFICANT CHANGE UP (ref 10.3–14.5)
RBC # FLD: 14.2 % — SIGNIFICANT CHANGE UP (ref 10.3–14.5)
RBC # FLD: 14.3 % — SIGNIFICANT CHANGE UP (ref 10.3–14.5)
RBC # FLD: 14.7 % — HIGH (ref 10.3–14.5)
RBC # FLD: 15 % — HIGH (ref 10.3–14.5)
RBC # FLD: 15.1 % — HIGH (ref 10.3–14.5)
RBC # FLD: 15.4 % — HIGH (ref 10.3–14.5)
RBC # FLD: 15.9 % — HIGH (ref 10.3–14.5)
RBC # FLD: 16.5 % — HIGH (ref 10.3–14.5)
SAO2 % BLDA: 82 % — LOW (ref 92–96)
SAO2 % BLDA: 83 % — LOW (ref 92–96)
SAO2 % BLDA: 83 % — LOW (ref 92–96)
SAO2 % BLDA: 84 % — LOW (ref 92–96)
SAO2 % BLDA: 87 % — LOW (ref 92–96)
SAO2 % BLDA: 90 % — LOW (ref 92–96)
SAO2 % BLDA: 91 % — LOW (ref 92–96)
SAO2 % BLDA: 93 % — SIGNIFICANT CHANGE UP (ref 92–96)
SAO2 % BLDA: 97 % — HIGH (ref 92–96)
SAO2 % BLDA: 98 % — HIGH (ref 92–96)
SAO2 % BLDA: 99 % — HIGH (ref 92–96)
SARS-COV-2 IGG SERPL QL IA: NEGATIVE — SIGNIFICANT CHANGE UP
SARS-COV-2 IGM SERPL IA-ACNC: 0.07 INDEX — SIGNIFICANT CHANGE UP
SARS-COV-2 RNA SPEC QL NAA+PROBE: DETECTED
SODIUM SERPL-SCNC: 125 MMOL/L — LOW (ref 135–145)
SODIUM SERPL-SCNC: 129 MMOL/L — LOW (ref 135–145)
SODIUM SERPL-SCNC: 133 MMOL/L — LOW (ref 135–145)
SODIUM SERPL-SCNC: 133 MMOL/L — LOW (ref 135–145)
SODIUM SERPL-SCNC: 136 MMOL/L — SIGNIFICANT CHANGE UP (ref 135–145)
SODIUM SERPL-SCNC: 138 MMOL/L — SIGNIFICANT CHANGE UP (ref 135–145)
SODIUM SERPL-SCNC: 138 MMOL/L — SIGNIFICANT CHANGE UP (ref 135–145)
SODIUM SERPL-SCNC: 139 MMOL/L — SIGNIFICANT CHANGE UP (ref 135–145)
SODIUM SERPL-SCNC: 139 MMOL/L — SIGNIFICANT CHANGE UP (ref 135–145)
SODIUM SERPL-SCNC: 141 MMOL/L — SIGNIFICANT CHANGE UP (ref 135–145)
SODIUM SERPL-SCNC: 142 MMOL/L — SIGNIFICANT CHANGE UP (ref 135–145)
SP GR SPEC: 1.02 — SIGNIFICANT CHANGE UP (ref 1.01–1.02)
SPECIMEN SOURCE: SIGNIFICANT CHANGE UP
TROPONIN I SERPL-MCNC: 0.04 NG/ML — SIGNIFICANT CHANGE UP (ref 0.01–0.04)
UROBILINOGEN FLD QL: 1 MG/DL
WBC # BLD: 10.06 K/UL — SIGNIFICANT CHANGE UP (ref 3.8–10.5)
WBC # BLD: 12.8 K/UL — HIGH (ref 3.8–10.5)
WBC # BLD: 15.13 K/UL — HIGH (ref 3.8–10.5)
WBC # BLD: 15.8 K/UL — HIGH (ref 3.8–10.5)
WBC # BLD: 23.37 K/UL — HIGH (ref 3.8–10.5)
WBC # BLD: 6.49 K/UL — SIGNIFICANT CHANGE UP (ref 3.8–10.5)
WBC # BLD: 7.99 K/UL — SIGNIFICANT CHANGE UP (ref 3.8–10.5)
WBC # BLD: 8.36 K/UL — SIGNIFICANT CHANGE UP (ref 3.8–10.5)
WBC # BLD: 8.38 K/UL — SIGNIFICANT CHANGE UP (ref 3.8–10.5)
WBC # BLD: 9.25 K/UL — SIGNIFICANT CHANGE UP (ref 3.8–10.5)
WBC # BLD: 9.47 K/UL — SIGNIFICANT CHANGE UP (ref 3.8–10.5)
WBC # FLD AUTO: 10.06 K/UL — SIGNIFICANT CHANGE UP (ref 3.8–10.5)
WBC # FLD AUTO: 12.8 K/UL — HIGH (ref 3.8–10.5)
WBC # FLD AUTO: 15.13 K/UL — HIGH (ref 3.8–10.5)
WBC # FLD AUTO: 15.8 K/UL — HIGH (ref 3.8–10.5)
WBC # FLD AUTO: 23.37 K/UL — HIGH (ref 3.8–10.5)
WBC # FLD AUTO: 6.49 K/UL — SIGNIFICANT CHANGE UP (ref 3.8–10.5)
WBC # FLD AUTO: 7.99 K/UL — SIGNIFICANT CHANGE UP (ref 3.8–10.5)
WBC # FLD AUTO: 8.36 K/UL — SIGNIFICANT CHANGE UP (ref 3.8–10.5)
WBC # FLD AUTO: 8.38 K/UL — SIGNIFICANT CHANGE UP (ref 3.8–10.5)
WBC # FLD AUTO: 9.25 K/UL — SIGNIFICANT CHANGE UP (ref 3.8–10.5)
WBC # FLD AUTO: 9.47 K/UL — SIGNIFICANT CHANGE UP (ref 3.8–10.5)

## 2020-01-01 PROCEDURE — 51741 ELECTRO-UROFLOWMETRY FIRST: CPT

## 2020-01-01 PROCEDURE — 83735 ASSAY OF MAGNESIUM: CPT

## 2020-01-01 PROCEDURE — 82962 GLUCOSE BLOOD TEST: CPT

## 2020-01-01 PROCEDURE — 74018 RADEX ABDOMEN 1 VIEW: CPT

## 2020-01-01 PROCEDURE — 36556 INSERT NON-TUNNEL CV CATH: CPT

## 2020-01-01 PROCEDURE — 86803 HEPATITIS C AB TEST: CPT

## 2020-01-01 PROCEDURE — 99292 CRITICAL CARE ADDL 30 MIN: CPT | Mod: 25

## 2020-01-01 PROCEDURE — 86140 C-REACTIVE PROTEIN: CPT

## 2020-01-01 PROCEDURE — 85027 COMPLETE CBC AUTOMATED: CPT

## 2020-01-01 PROCEDURE — 83605 ASSAY OF LACTIC ACID: CPT

## 2020-01-01 PROCEDURE — 87070 CULTURE OTHR SPECIMN AEROBIC: CPT

## 2020-01-01 PROCEDURE — 71045 X-RAY EXAM CHEST 1 VIEW: CPT | Mod: 26

## 2020-01-01 PROCEDURE — 99213 OFFICE O/P EST LOW 20 MIN: CPT | Mod: 25

## 2020-01-01 PROCEDURE — 99292 CRITICAL CARE ADDL 30 MIN: CPT

## 2020-01-01 PROCEDURE — 84145 PROCALCITONIN (PCT): CPT

## 2020-01-01 PROCEDURE — 86769 SARS-COV-2 COVID-19 ANTIBODY: CPT

## 2020-01-01 PROCEDURE — 36600 WITHDRAWAL OF ARTERIAL BLOOD: CPT

## 2020-01-01 PROCEDURE — 94760 N-INVAS EAR/PLS OXIMETRY 1: CPT

## 2020-01-01 PROCEDURE — 82565 ASSAY OF CREATININE: CPT

## 2020-01-01 PROCEDURE — 84484 ASSAY OF TROPONIN QUANT: CPT

## 2020-01-01 PROCEDURE — 94003 VENT MGMT INPAT SUBQ DAY: CPT

## 2020-01-01 PROCEDURE — 99291 CRITICAL CARE FIRST HOUR: CPT

## 2020-01-01 PROCEDURE — 36620 INSERTION CATHETER ARTERY: CPT

## 2020-01-01 PROCEDURE — 99223 1ST HOSP IP/OBS HIGH 75: CPT | Mod: AI

## 2020-01-01 PROCEDURE — 99291 CRITICAL CARE FIRST HOUR: CPT | Mod: 25

## 2020-01-01 PROCEDURE — 93010 ELECTROCARDIOGRAM REPORT: CPT

## 2020-01-01 PROCEDURE — 83036 HEMOGLOBIN GLYCOSYLATED A1C: CPT

## 2020-01-01 PROCEDURE — 76937 US GUIDE VASCULAR ACCESS: CPT | Mod: 26

## 2020-01-01 PROCEDURE — C9399: CPT

## 2020-01-01 PROCEDURE — 90935 HEMODIALYSIS ONE EVALUATION: CPT

## 2020-01-01 PROCEDURE — 74018 RADEX ABDOMEN 1 VIEW: CPT | Mod: 26

## 2020-01-01 PROCEDURE — 94002 VENT MGMT INPAT INIT DAY: CPT

## 2020-01-01 PROCEDURE — 80053 COMPREHEN METABOLIC PANEL: CPT

## 2020-01-01 PROCEDURE — 51798 US URINE CAPACITY MEASURE: CPT | Mod: 59

## 2020-01-01 PROCEDURE — 36415 COLL VENOUS BLD VENIPUNCTURE: CPT

## 2020-01-01 PROCEDURE — 84100 ASSAY OF PHOSPHORUS: CPT

## 2020-01-01 PROCEDURE — 93005 ELECTROCARDIOGRAM TRACING: CPT

## 2020-01-01 PROCEDURE — 80076 HEPATIC FUNCTION PANEL: CPT

## 2020-01-01 PROCEDURE — 80048 BASIC METABOLIC PNL TOTAL CA: CPT

## 2020-01-01 PROCEDURE — 82803 BLOOD GASES ANY COMBINATION: CPT

## 2020-01-01 PROCEDURE — 85379 FIBRIN DEGRADATION QUANT: CPT

## 2020-01-01 PROCEDURE — 94660 CPAP INITIATION&MGMT: CPT

## 2020-01-01 PROCEDURE — 80074 ACUTE HEPATITIS PANEL: CPT

## 2020-01-01 PROCEDURE — 71045 X-RAY EXAM CHEST 1 VIEW: CPT

## 2020-01-01 PROCEDURE — 82728 ASSAY OF FERRITIN: CPT

## 2020-01-01 RX ORDER — DEXAMETHASONE 0.5 MG/5ML
8 ELIXIR ORAL DAILY
Refills: 0 | Status: DISCONTINUED | OUTPATIENT
Start: 2020-01-01 | End: 2020-01-01

## 2020-01-01 RX ORDER — MEROPENEM 1 G/30ML
1000 INJECTION INTRAVENOUS EVERY 12 HOURS
Refills: 0 | Status: DISCONTINUED | OUTPATIENT
Start: 2020-01-01 | End: 2020-01-01

## 2020-01-01 RX ORDER — POTASSIUM CHLORIDE 20 MEQ
40 PACKET (EA) ORAL ONCE
Refills: 0 | Status: COMPLETED | OUTPATIENT
Start: 2020-01-01 | End: 2020-01-01

## 2020-01-01 RX ORDER — LISINOPRIL 2.5 MG/1
1 TABLET ORAL
Qty: 0 | Refills: 0 | DISCHARGE

## 2020-01-01 RX ORDER — INSULIN GLARGINE 100 [IU]/ML
30 INJECTION, SOLUTION SUBCUTANEOUS
Refills: 0 | Status: DISCONTINUED | OUTPATIENT
Start: 2020-01-01 | End: 2020-01-01

## 2020-01-01 RX ORDER — INSULIN GLARGINE 100 [IU]/ML
24 INJECTION, SOLUTION SUBCUTANEOUS
Refills: 0 | Status: DISCONTINUED | OUTPATIENT
Start: 2020-01-01 | End: 2020-01-01

## 2020-01-01 RX ORDER — MIDODRINE HYDROCHLORIDE 2.5 MG/1
5 TABLET ORAL EVERY 8 HOURS
Refills: 0 | Status: DISCONTINUED | OUTPATIENT
Start: 2020-01-01 | End: 2020-01-01

## 2020-01-01 RX ORDER — SODIUM CHLORIDE 9 MG/ML
1000 INJECTION, SOLUTION INTRAVENOUS
Refills: 0 | Status: DISCONTINUED | OUTPATIENT
Start: 2020-01-01 | End: 2020-01-01

## 2020-01-01 RX ORDER — LAMOTRIGINE 25 MG/1
100 TABLET, ORALLY DISINTEGRATING ORAL
Refills: 0 | Status: DISCONTINUED | OUTPATIENT
Start: 2020-01-01 | End: 2020-01-01

## 2020-01-01 RX ORDER — ISOSORBIDE MONONITRATE 60 MG/1
1 TABLET, EXTENDED RELEASE ORAL
Qty: 0 | Refills: 0 | DISCHARGE

## 2020-01-01 RX ORDER — MORPHINE SULFATE 50 MG/1
2 CAPSULE, EXTENDED RELEASE ORAL ONCE
Refills: 0 | Status: DISCONTINUED | OUTPATIENT
Start: 2020-01-01 | End: 2020-01-01

## 2020-01-01 RX ORDER — REMDESIVIR 5 MG/ML
200 INJECTION INTRAVENOUS EVERY 24 HOURS
Refills: 0 | Status: COMPLETED | OUTPATIENT
Start: 2020-01-01 | End: 2020-01-01

## 2020-01-01 RX ORDER — CISATRACURIUM BESYLATE 2 MG/ML
3 INJECTION INTRAVENOUS
Qty: 200 | Refills: 0 | Status: DISCONTINUED | OUTPATIENT
Start: 2020-01-01 | End: 2020-01-01

## 2020-01-01 RX ORDER — DEXTROSE 50 % IN WATER 50 %
25 SYRINGE (ML) INTRAVENOUS ONCE
Refills: 0 | Status: DISCONTINUED | OUTPATIENT
Start: 2020-01-01 | End: 2020-01-01

## 2020-01-01 RX ORDER — DEXAMETHASONE 0.5 MG/5ML
10 ELIXIR ORAL DAILY
Refills: 0 | Status: DISCONTINUED | OUTPATIENT
Start: 2020-01-01 | End: 2020-01-01

## 2020-01-01 RX ORDER — REMDESIVIR 5 MG/ML
INJECTION INTRAVENOUS
Refills: 0 | Status: COMPLETED | OUTPATIENT
Start: 2020-01-01 | End: 2020-01-01

## 2020-01-01 RX ORDER — INSULIN GLARGINE 100 [IU]/ML
40 INJECTION, SOLUTION SUBCUTANEOUS
Refills: 0 | Status: DISCONTINUED | OUTPATIENT
Start: 2020-01-01 | End: 2020-01-01

## 2020-01-01 RX ORDER — ALPRAZOLAM 0.25 MG
1 TABLET ORAL ONCE
Refills: 0 | Status: DISCONTINUED | OUTPATIENT
Start: 2020-01-01 | End: 2020-01-01

## 2020-01-01 RX ORDER — INSULIN GLARGINE 100 [IU]/ML
10 INJECTION, SOLUTION SUBCUTANEOUS AT BEDTIME
Refills: 0 | Status: DISCONTINUED | OUTPATIENT
Start: 2020-01-01 | End: 2020-01-01

## 2020-01-01 RX ORDER — AMIODARONE HYDROCHLORIDE 400 MG/1
0.5 TABLET ORAL
Qty: 900 | Refills: 0 | Status: DISCONTINUED | OUTPATIENT
Start: 2020-01-01 | End: 2020-01-01

## 2020-01-01 RX ORDER — MEROPENEM 1 G/30ML
500 INJECTION INTRAVENOUS EVERY 12 HOURS
Refills: 0 | Status: DISCONTINUED | OUTPATIENT
Start: 2020-01-01 | End: 2020-01-01

## 2020-01-01 RX ORDER — PRIMIDONE 250 MG/1
250 TABLET ORAL DAILY
Refills: 0 | Status: DISCONTINUED | OUTPATIENT
Start: 2020-01-01 | End: 2020-01-01

## 2020-01-01 RX ORDER — DEXTROSE 50 % IN WATER 50 %
15 SYRINGE (ML) INTRAVENOUS ONCE
Refills: 0 | Status: DISCONTINUED | OUTPATIENT
Start: 2020-01-01 | End: 2020-01-01

## 2020-01-01 RX ORDER — NOREPINEPHRINE BITARTRATE/D5W 8 MG/250ML
0.05 PLASTIC BAG, INJECTION (ML) INTRAVENOUS
Qty: 8 | Refills: 0 | Status: DISCONTINUED | OUTPATIENT
Start: 2020-01-01 | End: 2020-01-01

## 2020-01-01 RX ORDER — FAMOTIDINE 10 MG/ML
20 INJECTION INTRAVENOUS DAILY
Refills: 0 | Status: DISCONTINUED | OUTPATIENT
Start: 2020-01-01 | End: 2020-01-01

## 2020-01-01 RX ORDER — SODIUM BICARBONATE 1 MEQ/ML
100 SYRINGE (ML) INTRAVENOUS ONCE
Refills: 0 | Status: COMPLETED | OUTPATIENT
Start: 2020-01-01 | End: 2020-01-01

## 2020-01-01 RX ORDER — SODIUM CHLORIDE 9 MG/ML
500 INJECTION, SOLUTION INTRAVENOUS ONCE
Refills: 0 | Status: COMPLETED | OUTPATIENT
Start: 2020-01-01 | End: 2020-01-01

## 2020-01-01 RX ORDER — ASPIRIN/CALCIUM CARB/MAGNESIUM 324 MG
81 TABLET ORAL DAILY
Refills: 0 | Status: DISCONTINUED | OUTPATIENT
Start: 2020-01-01 | End: 2020-01-01

## 2020-01-01 RX ORDER — POTASSIUM CHLORIDE 20 MEQ
10 PACKET (EA) ORAL
Refills: 0 | Status: COMPLETED | OUTPATIENT
Start: 2020-01-01 | End: 2020-01-01

## 2020-01-01 RX ORDER — INSULIN GLARGINE 100 [IU]/ML
10 INJECTION, SOLUTION SUBCUTANEOUS EVERY MORNING
Refills: 0 | Status: DISCONTINUED | OUTPATIENT
Start: 2020-01-01 | End: 2020-01-01

## 2020-01-01 RX ORDER — FENTANYL CITRATE 50 UG/ML
0.5 INJECTION INTRAVENOUS
Qty: 5000 | Refills: 0 | Status: DISCONTINUED | OUTPATIENT
Start: 2020-01-01 | End: 2020-01-01

## 2020-01-01 RX ORDER — ASPIRIN/CALCIUM CARB/MAGNESIUM 324 MG
1 TABLET ORAL
Qty: 0 | Refills: 0 | DISCHARGE

## 2020-01-01 RX ORDER — CEFTRIAXONE 500 MG/1
1000 INJECTION, POWDER, FOR SOLUTION INTRAMUSCULAR; INTRAVENOUS EVERY 24 HOURS
Refills: 0 | Status: COMPLETED | OUTPATIENT
Start: 2020-01-01 | End: 2020-01-01

## 2020-01-01 RX ORDER — HEPARIN SODIUM 5000 [USP'U]/ML
7500 INJECTION INTRAVENOUS; SUBCUTANEOUS EVERY 8 HOURS
Refills: 0 | Status: DISCONTINUED | OUTPATIENT
Start: 2020-01-01 | End: 2020-01-01

## 2020-01-01 RX ORDER — SENNA PLUS 8.6 MG/1
2 TABLET ORAL AT BEDTIME
Refills: 0 | Status: DISCONTINUED | OUTPATIENT
Start: 2020-01-01 | End: 2020-01-01

## 2020-01-01 RX ORDER — ACETAMINOPHEN 500 MG
650 TABLET ORAL ONCE
Refills: 0 | Status: COMPLETED | OUTPATIENT
Start: 2020-01-01 | End: 2020-01-01

## 2020-01-01 RX ORDER — REMDESIVIR 5 MG/ML
100 INJECTION INTRAVENOUS EVERY 24 HOURS
Refills: 0 | Status: DISCONTINUED | OUTPATIENT
Start: 2020-01-01 | End: 2020-01-01

## 2020-01-01 RX ORDER — FAMOTIDINE 10 MG/ML
20 INJECTION INTRAVENOUS
Refills: 0 | Status: DISCONTINUED | OUTPATIENT
Start: 2020-01-01 | End: 2020-01-01

## 2020-01-01 RX ORDER — ALBUTEROL 90 UG/1
2 AEROSOL, METERED ORAL EVERY 4 HOURS
Refills: 0 | Status: DISCONTINUED | OUTPATIENT
Start: 2020-01-01 | End: 2020-01-01

## 2020-01-01 RX ORDER — DEXAMETHASONE 0.5 MG/5ML
6 ELIXIR ORAL DAILY
Refills: 0 | Status: DISCONTINUED | OUTPATIENT
Start: 2020-01-01 | End: 2020-01-01

## 2020-01-01 RX ORDER — CEFEPIME 1 G/1
1000 INJECTION, POWDER, FOR SOLUTION INTRAMUSCULAR; INTRAVENOUS ONCE
Refills: 0 | Status: COMPLETED | OUTPATIENT
Start: 2020-01-01 | End: 2020-01-01

## 2020-01-01 RX ORDER — NOREPINEPHRINE BITARTRATE/D5W 8 MG/250ML
0.05 PLASTIC BAG, INJECTION (ML) INTRAVENOUS
Qty: 16 | Refills: 0 | Status: DISCONTINUED | OUTPATIENT
Start: 2020-01-01 | End: 2020-01-01

## 2020-01-01 RX ORDER — AMLODIPINE BESYLATE 2.5 MG/1
10 TABLET ORAL DAILY
Refills: 0 | Status: DISCONTINUED | OUTPATIENT
Start: 2020-01-01 | End: 2020-01-01

## 2020-01-01 RX ORDER — ACETAMINOPHEN 500 MG
650 TABLET ORAL EVERY 4 HOURS
Refills: 0 | Status: DISCONTINUED | OUTPATIENT
Start: 2020-01-01 | End: 2020-01-01

## 2020-01-01 RX ORDER — MEROPENEM 1 G/30ML
500 INJECTION INTRAVENOUS EVERY 24 HOURS
Refills: 0 | Status: DISCONTINUED | OUTPATIENT
Start: 2020-01-01 | End: 2020-01-01

## 2020-01-01 RX ORDER — VASOPRESSIN 20 [USP'U]/ML
0.04 INJECTION INTRAVENOUS
Qty: 50 | Refills: 0 | Status: DISCONTINUED | OUTPATIENT
Start: 2020-01-01 | End: 2020-01-01

## 2020-01-01 RX ORDER — FUROSEMIDE 40 MG
40 TABLET ORAL ONCE
Refills: 0 | Status: COMPLETED | OUTPATIENT
Start: 2020-01-01 | End: 2020-01-01

## 2020-01-01 RX ORDER — DEXAMETHASONE 0.5 MG/5ML
6 ELIXIR ORAL ONCE
Refills: 0 | Status: COMPLETED | OUTPATIENT
Start: 2020-01-01 | End: 2020-01-01

## 2020-01-01 RX ORDER — INSULIN GLARGINE 100 [IU]/ML
24 INJECTION, SOLUTION SUBCUTANEOUS ONCE
Refills: 0 | Status: COMPLETED | OUTPATIENT
Start: 2020-01-01 | End: 2020-01-01

## 2020-01-01 RX ORDER — CHLORHEXIDINE GLUCONATE 213 G/1000ML
15 SOLUTION TOPICAL EVERY 12 HOURS
Refills: 0 | Status: DISCONTINUED | OUTPATIENT
Start: 2020-01-01 | End: 2020-01-01

## 2020-01-01 RX ORDER — SIMVASTATIN 20 MG/1
40 TABLET, FILM COATED ORAL AT BEDTIME
Refills: 0 | Status: DISCONTINUED | OUTPATIENT
Start: 2020-01-01 | End: 2020-01-01

## 2020-01-01 RX ORDER — INSULIN LISPRO 100/ML
VIAL (ML) SUBCUTANEOUS AT BEDTIME
Refills: 0 | Status: DISCONTINUED | OUTPATIENT
Start: 2020-01-01 | End: 2020-01-01

## 2020-01-01 RX ORDER — GLIMEPIRIDE 1 MG
1 TABLET ORAL
Qty: 0 | Refills: 0 | DISCHARGE

## 2020-01-01 RX ORDER — ISOSORBIDE MONONITRATE 60 MG/1
30 TABLET, EXTENDED RELEASE ORAL DAILY
Refills: 0 | Status: DISCONTINUED | OUTPATIENT
Start: 2020-01-01 | End: 2020-01-01

## 2020-01-01 RX ORDER — FUROSEMIDE 40 MG
20 TABLET ORAL DAILY
Refills: 0 | Status: DISCONTINUED | OUTPATIENT
Start: 2020-01-01 | End: 2020-01-01

## 2020-01-01 RX ORDER — REMDESIVIR 5 MG/ML
100 INJECTION INTRAVENOUS EVERY 24 HOURS
Refills: 0 | Status: COMPLETED | OUTPATIENT
Start: 2020-01-01 | End: 2020-01-01

## 2020-01-01 RX ORDER — INSULIN GLARGINE 100 [IU]/ML
34 INJECTION, SOLUTION SUBCUTANEOUS
Refills: 0 | Status: DISCONTINUED | OUTPATIENT
Start: 2020-01-01 | End: 2020-01-01

## 2020-01-01 RX ORDER — CISATRACURIUM BESYLATE 2 MG/ML
2 INJECTION INTRAVENOUS
Qty: 200 | Refills: 0 | Status: DISCONTINUED | OUTPATIENT
Start: 2020-01-01 | End: 2020-01-01

## 2020-01-01 RX ORDER — SITAGLIPTIN 50 MG/1
1 TABLET, FILM COATED ORAL
Qty: 0 | Refills: 0 | DISCHARGE

## 2020-01-01 RX ORDER — PRIMIDONE 250 MG/1
50 TABLET ORAL DAILY
Refills: 0 | Status: DISCONTINUED | OUTPATIENT
Start: 2020-01-01 | End: 2020-01-01

## 2020-01-01 RX ORDER — INSULIN LISPRO 100/ML
VIAL (ML) SUBCUTANEOUS
Refills: 0 | Status: DISCONTINUED | OUTPATIENT
Start: 2020-01-01 | End: 2020-01-01

## 2020-01-01 RX ORDER — INSULIN GLARGINE 100 [IU]/ML
15 INJECTION, SOLUTION SUBCUTANEOUS AT BEDTIME
Refills: 0 | Status: DISCONTINUED | OUTPATIENT
Start: 2020-01-01 | End: 2020-01-01

## 2020-01-01 RX ORDER — AMIODARONE HYDROCHLORIDE 400 MG/1
1 TABLET ORAL
Qty: 900 | Refills: 0 | Status: DISCONTINUED | OUTPATIENT
Start: 2020-01-01 | End: 2020-01-01

## 2020-01-01 RX ORDER — GLUCAGON INJECTION, SOLUTION 0.5 MG/.1ML
1 INJECTION, SOLUTION SUBCUTANEOUS ONCE
Refills: 0 | Status: DISCONTINUED | OUTPATIENT
Start: 2020-01-01 | End: 2020-01-01

## 2020-01-01 RX ORDER — PRIMIDONE 250 MG/1
150 TABLET ORAL AT BEDTIME
Refills: 0 | Status: DISCONTINUED | OUTPATIENT
Start: 2020-01-01 | End: 2020-01-01

## 2020-01-01 RX ORDER — PROPOFOL 10 MG/ML
30 INJECTION, EMULSION INTRAVENOUS
Qty: 1000 | Refills: 0 | Status: DISCONTINUED | OUTPATIENT
Start: 2020-01-01 | End: 2020-01-01

## 2020-01-01 RX ORDER — INSULIN LISPRO 100/ML
4 VIAL (ML) SUBCUTANEOUS ONCE
Refills: 0 | Status: COMPLETED | OUTPATIENT
Start: 2020-01-01 | End: 2020-01-01

## 2020-01-01 RX ORDER — INSULIN GLARGINE 100 [IU]/ML
18 INJECTION, SOLUTION SUBCUTANEOUS
Refills: 0 | Status: DISCONTINUED | OUTPATIENT
Start: 2020-01-01 | End: 2020-01-01

## 2020-01-01 RX ORDER — ALPRAZOLAM 0.25 MG
1 TABLET ORAL
Qty: 0 | Refills: 0 | DISCHARGE

## 2020-01-01 RX ORDER — PRIMIDONE 250 MG/1
3 TABLET ORAL
Qty: 0 | Refills: 0 | DISCHARGE

## 2020-01-01 RX ORDER — POTASSIUM CHLORIDE 20 MEQ
10 PACKET (EA) ORAL
Refills: 0 | Status: DISCONTINUED | OUTPATIENT
Start: 2020-01-01 | End: 2020-01-01

## 2020-01-01 RX ORDER — ENOXAPARIN SODIUM 100 MG/ML
40 INJECTION SUBCUTANEOUS EVERY 12 HOURS
Refills: 0 | Status: DISCONTINUED | OUTPATIENT
Start: 2020-01-01 | End: 2020-01-01

## 2020-01-01 RX ORDER — VANCOMYCIN HCL 1 G
1000 VIAL (EA) INTRAVENOUS ONCE
Refills: 0 | Status: COMPLETED | OUTPATIENT
Start: 2020-01-01 | End: 2020-01-01

## 2020-01-01 RX ORDER — SIMVASTATIN 20 MG/1
1 TABLET, FILM COATED ORAL
Qty: 0 | Refills: 0 | DISCHARGE

## 2020-01-01 RX ORDER — AMLODIPINE BESYLATE 2.5 MG/1
1 TABLET ORAL
Qty: 0 | Refills: 0 | DISCHARGE

## 2020-01-01 RX ORDER — LISINOPRIL 2.5 MG/1
40 TABLET ORAL DAILY
Refills: 0 | Status: DISCONTINUED | OUTPATIENT
Start: 2020-01-01 | End: 2020-01-01

## 2020-01-01 RX ORDER — PRIMIDONE 250 MG/1
6 TABLET ORAL
Qty: 0 | Refills: 0 | DISCHARGE

## 2020-01-01 RX ORDER — GUAIFENESIN/DEXTROMETHORPHAN 600MG-30MG
10 TABLET, EXTENDED RELEASE 12 HR ORAL EVERY 4 HOURS
Refills: 0 | Status: DISCONTINUED | OUTPATIENT
Start: 2020-01-01 | End: 2020-01-01

## 2020-01-01 RX ORDER — SODIUM CHLORIDE 9 MG/ML
1000 INJECTION INTRAMUSCULAR; INTRAVENOUS; SUBCUTANEOUS ONCE
Refills: 0 | Status: COMPLETED | OUTPATIENT
Start: 2020-01-01 | End: 2020-01-01

## 2020-01-01 RX ORDER — PHENYLEPHRINE HYDROCHLORIDE 10 MG/ML
0.5 INJECTION INTRAVENOUS
Qty: 160 | Refills: 0 | Status: DISCONTINUED | OUTPATIENT
Start: 2020-01-01 | End: 2020-01-01

## 2020-01-01 RX ORDER — POLYETHYLENE GLYCOL 3350 17 G/17G
17 POWDER, FOR SOLUTION ORAL DAILY
Refills: 0 | Status: DISCONTINUED | OUTPATIENT
Start: 2020-01-01 | End: 2020-01-01

## 2020-01-01 RX ORDER — FUROSEMIDE 40 MG
1 TABLET ORAL
Qty: 0 | Refills: 0 | DISCHARGE

## 2020-01-01 RX ORDER — LAMOTRIGINE 25 MG/1
1 TABLET, ORALLY DISINTEGRATING ORAL
Qty: 0 | Refills: 0 | DISCHARGE

## 2020-01-01 RX ORDER — INSULIN LISPRO 100/ML
VIAL (ML) SUBCUTANEOUS EVERY 6 HOURS
Refills: 0 | Status: DISCONTINUED | OUTPATIENT
Start: 2020-01-01 | End: 2020-01-01

## 2020-01-01 RX ADMIN — LAMOTRIGINE 100 MILLIGRAM(S): 25 TABLET, ORALLY DISINTEGRATING ORAL at 09:48

## 2020-01-01 RX ADMIN — ENOXAPARIN SODIUM 40 MILLIGRAM(S): 100 INJECTION SUBCUTANEOUS at 21:45

## 2020-01-01 RX ADMIN — Medication 40 MILLIGRAM(S): at 09:06

## 2020-01-01 RX ADMIN — POLYETHYLENE GLYCOL 3350 17 GRAM(S): 17 POWDER, FOR SOLUTION ORAL at 10:17

## 2020-01-01 RX ADMIN — ENOXAPARIN SODIUM 40 MILLIGRAM(S): 100 INJECTION SUBCUTANEOUS at 09:06

## 2020-01-01 RX ADMIN — PRIMIDONE 250 MILLIGRAM(S): 250 TABLET ORAL at 12:38

## 2020-01-01 RX ADMIN — Medication 650 MILLIGRAM(S): at 22:30

## 2020-01-01 RX ADMIN — CHLORHEXIDINE GLUCONATE 15 MILLILITER(S): 213 SOLUTION TOPICAL at 12:21

## 2020-01-01 RX ADMIN — LAMOTRIGINE 100 MILLIGRAM(S): 25 TABLET, ORALLY DISINTEGRATING ORAL at 10:23

## 2020-01-01 RX ADMIN — ENOXAPARIN SODIUM 40 MILLIGRAM(S): 100 INJECTION SUBCUTANEOUS at 09:47

## 2020-01-01 RX ADMIN — MIDODRINE HYDROCHLORIDE 5 MILLIGRAM(S): 2.5 TABLET ORAL at 22:19

## 2020-01-01 RX ADMIN — CEFTRIAXONE 1000 MILLIGRAM(S): 500 INJECTION, POWDER, FOR SOLUTION INTRAMUSCULAR; INTRAVENOUS at 06:10

## 2020-01-01 RX ADMIN — INSULIN GLARGINE 18 UNIT(S): 100 INJECTION, SOLUTION SUBCUTANEOUS at 21:59

## 2020-01-01 RX ADMIN — HEPARIN SODIUM 7500 UNIT(S): 5000 INJECTION INTRAVENOUS; SUBCUTANEOUS at 05:47

## 2020-01-01 RX ADMIN — MEROPENEM 100 MILLIGRAM(S): 1 INJECTION INTRAVENOUS at 09:16

## 2020-01-01 RX ADMIN — Medication 8: at 17:14

## 2020-01-01 RX ADMIN — Medication 102 MILLIGRAM(S): at 12:13

## 2020-01-01 RX ADMIN — PROPOFOL 16.3 MICROGRAM(S)/KG/MIN: 10 INJECTION, EMULSION INTRAVENOUS at 02:21

## 2020-01-01 RX ADMIN — POLYETHYLENE GLYCOL 3350 17 GRAM(S): 17 POWDER, FOR SOLUTION ORAL at 12:00

## 2020-01-01 RX ADMIN — PRIMIDONE 50 MILLIGRAM(S): 250 TABLET ORAL at 12:38

## 2020-01-01 RX ADMIN — Medication 81 MILLIGRAM(S): at 12:36

## 2020-01-01 RX ADMIN — LAMOTRIGINE 100 MILLIGRAM(S): 25 TABLET, ORALLY DISINTEGRATING ORAL at 09:06

## 2020-01-01 RX ADMIN — LISINOPRIL 40 MILLIGRAM(S): 2.5 TABLET ORAL at 10:22

## 2020-01-01 RX ADMIN — LAMOTRIGINE 100 MILLIGRAM(S): 25 TABLET, ORALLY DISINTEGRATING ORAL at 10:49

## 2020-01-01 RX ADMIN — ENOXAPARIN SODIUM 40 MILLIGRAM(S): 100 INJECTION SUBCUTANEOUS at 12:37

## 2020-01-01 RX ADMIN — CEFTRIAXONE 1000 MILLIGRAM(S): 500 INJECTION, POWDER, FOR SOLUTION INTRAMUSCULAR; INTRAVENOUS at 04:48

## 2020-01-01 RX ADMIN — Medication 2 MILLIGRAM(S): at 10:52

## 2020-01-01 RX ADMIN — Medication 8: at 18:24

## 2020-01-01 RX ADMIN — ENOXAPARIN SODIUM 40 MILLIGRAM(S): 100 INJECTION SUBCUTANEOUS at 22:43

## 2020-01-01 RX ADMIN — CHLORHEXIDINE GLUCONATE 15 MILLILITER(S): 213 SOLUTION TOPICAL at 23:18

## 2020-01-01 RX ADMIN — Medication 20 MILLIGRAM(S): at 10:30

## 2020-01-01 RX ADMIN — Medication 20 MILLIGRAM(S): at 10:21

## 2020-01-01 RX ADMIN — Medication 4: at 07:32

## 2020-01-01 RX ADMIN — PRIMIDONE 250 MILLIGRAM(S): 250 TABLET ORAL at 11:07

## 2020-01-01 RX ADMIN — Medication 10 MILLILITER(S): at 00:45

## 2020-01-01 RX ADMIN — Medication 650 MILLIGRAM(S): at 10:21

## 2020-01-01 RX ADMIN — Medication 20 MILLIGRAM(S): at 09:47

## 2020-01-01 RX ADMIN — Medication 4: at 05:49

## 2020-01-01 RX ADMIN — FAMOTIDINE 20 MILLIGRAM(S): 10 INJECTION INTRAVENOUS at 11:06

## 2020-01-01 RX ADMIN — Medication 101.6 MILLIGRAM(S): at 11:04

## 2020-01-01 RX ADMIN — Medication 20 MILLIGRAM(S): at 11:06

## 2020-01-01 RX ADMIN — LAMOTRIGINE 100 MILLIGRAM(S): 25 TABLET, ORALLY DISINTEGRATING ORAL at 22:26

## 2020-01-01 RX ADMIN — HEPARIN SODIUM 7500 UNIT(S): 5000 INJECTION INTRAVENOUS; SUBCUTANEOUS at 06:31

## 2020-01-01 RX ADMIN — LISINOPRIL 40 MILLIGRAM(S): 2.5 TABLET ORAL at 09:09

## 2020-01-01 RX ADMIN — ENOXAPARIN SODIUM 40 MILLIGRAM(S): 100 INJECTION SUBCUTANEOUS at 21:01

## 2020-01-01 RX ADMIN — PRIMIDONE 250 MILLIGRAM(S): 250 TABLET ORAL at 12:00

## 2020-01-01 RX ADMIN — Medication 650 MILLIGRAM(S): at 09:52

## 2020-01-01 RX ADMIN — PROPOFOL 16.3 MICROGRAM(S)/KG/MIN: 10 INJECTION, EMULSION INTRAVENOUS at 13:46

## 2020-01-01 RX ADMIN — MEROPENEM 100 MILLIGRAM(S): 1 INJECTION INTRAVENOUS at 22:36

## 2020-01-01 RX ADMIN — MEROPENEM 100 MILLIGRAM(S): 1 INJECTION INTRAVENOUS at 13:01

## 2020-01-01 RX ADMIN — CISATRACURIUM BESYLATE 16.3 MICROGRAM(S)/KG/MIN: 2 INJECTION INTRAVENOUS at 21:35

## 2020-01-01 RX ADMIN — SODIUM CHLORIDE 1000 MILLILITER(S): 9 INJECTION, SOLUTION INTRAVENOUS at 00:18

## 2020-01-01 RX ADMIN — PRIMIDONE 150 MILLIGRAM(S): 250 TABLET ORAL at 22:38

## 2020-01-01 RX ADMIN — REMDESIVIR 500 MILLIGRAM(S): 5 INJECTION INTRAVENOUS at 10:02

## 2020-01-01 RX ADMIN — Medication 8: at 18:00

## 2020-01-01 RX ADMIN — HEPARIN SODIUM 7500 UNIT(S): 5000 INJECTION INTRAVENOUS; SUBCUTANEOUS at 23:35

## 2020-01-01 RX ADMIN — Medication 650 MILLIGRAM(S): at 07:30

## 2020-01-01 RX ADMIN — Medication 101.6 MILLIGRAM(S): at 10:33

## 2020-01-01 RX ADMIN — PRIMIDONE 250 MILLIGRAM(S): 250 TABLET ORAL at 10:49

## 2020-01-01 RX ADMIN — Medication 650 MILLIGRAM(S): at 02:00

## 2020-01-01 RX ADMIN — POLYETHYLENE GLYCOL 3350 17 GRAM(S): 17 POWDER, FOR SOLUTION ORAL at 10:20

## 2020-01-01 RX ADMIN — Medication 81 MILLIGRAM(S): at 11:06

## 2020-01-01 RX ADMIN — Medication 81 MILLIGRAM(S): at 10:21

## 2020-01-01 RX ADMIN — LAMOTRIGINE 100 MILLIGRAM(S): 25 TABLET, ORALLY DISINTEGRATING ORAL at 11:07

## 2020-01-01 RX ADMIN — ENOXAPARIN SODIUM 40 MILLIGRAM(S): 100 INJECTION SUBCUTANEOUS at 21:31

## 2020-01-01 RX ADMIN — LISINOPRIL 40 MILLIGRAM(S): 2.5 TABLET ORAL at 09:06

## 2020-01-01 RX ADMIN — LAMOTRIGINE 100 MILLIGRAM(S): 25 TABLET, ORALLY DISINTEGRATING ORAL at 22:35

## 2020-01-01 RX ADMIN — CHLORHEXIDINE GLUCONATE 15 MILLILITER(S): 213 SOLUTION TOPICAL at 21:31

## 2020-01-01 RX ADMIN — CHLORHEXIDINE GLUCONATE 15 MILLILITER(S): 213 SOLUTION TOPICAL at 10:33

## 2020-01-01 RX ADMIN — Medication 4: at 22:56

## 2020-01-01 RX ADMIN — PRIMIDONE 250 MILLIGRAM(S): 250 TABLET ORAL at 09:17

## 2020-01-01 RX ADMIN — SENNA PLUS 2 TABLET(S): 8.6 TABLET ORAL at 22:19

## 2020-01-01 RX ADMIN — REMDESIVIR 500 MILLIGRAM(S): 5 INJECTION INTRAVENOUS at 12:39

## 2020-01-01 RX ADMIN — LAMOTRIGINE 100 MILLIGRAM(S): 25 TABLET, ORALLY DISINTEGRATING ORAL at 12:37

## 2020-01-01 RX ADMIN — Medication 650 MILLIGRAM(S): at 07:08

## 2020-01-01 RX ADMIN — HEPARIN SODIUM 7500 UNIT(S): 5000 INJECTION INTRAVENOUS; SUBCUTANEOUS at 06:19

## 2020-01-01 RX ADMIN — SIMVASTATIN 40 MILLIGRAM(S): 20 TABLET, FILM COATED ORAL at 23:19

## 2020-01-01 RX ADMIN — Medication 650 MILLIGRAM(S): at 22:47

## 2020-01-01 RX ADMIN — SIMVASTATIN 40 MILLIGRAM(S): 20 TABLET, FILM COATED ORAL at 22:36

## 2020-01-01 RX ADMIN — MIDODRINE HYDROCHLORIDE 5 MILLIGRAM(S): 2.5 TABLET ORAL at 15:22

## 2020-01-01 RX ADMIN — ENOXAPARIN SODIUM 40 MILLIGRAM(S): 100 INJECTION SUBCUTANEOUS at 10:48

## 2020-01-01 RX ADMIN — Medication 20 MILLIGRAM(S): at 09:10

## 2020-01-01 RX ADMIN — Medication 81 MILLIGRAM(S): at 09:07

## 2020-01-01 RX ADMIN — PROPOFOL 16.3 MICROGRAM(S)/KG/MIN: 10 INJECTION, EMULSION INTRAVENOUS at 20:44

## 2020-01-01 RX ADMIN — Medication 650 MILLIGRAM(S): at 05:15

## 2020-01-01 RX ADMIN — CHLORHEXIDINE GLUCONATE 15 MILLILITER(S): 213 SOLUTION TOPICAL at 22:38

## 2020-01-01 RX ADMIN — CISATRACURIUM BESYLATE 16.3 MICROGRAM(S)/KG/MIN: 2 INJECTION INTRAVENOUS at 10:30

## 2020-01-01 RX ADMIN — Medication 650 MILLIGRAM(S): at 01:45

## 2020-01-01 RX ADMIN — Medication 2: at 12:46

## 2020-01-01 RX ADMIN — INSULIN GLARGINE 40 UNIT(S): 100 INJECTION, SOLUTION SUBCUTANEOUS at 08:42

## 2020-01-01 RX ADMIN — CEFTRIAXONE 1000 MILLIGRAM(S): 500 INJECTION, POWDER, FOR SOLUTION INTRAMUSCULAR; INTRAVENOUS at 05:37

## 2020-01-01 RX ADMIN — Medication 81 MILLIGRAM(S): at 10:29

## 2020-01-01 RX ADMIN — CISATRACURIUM BESYLATE 16.3 MICROGRAM(S)/KG/MIN: 2 INJECTION INTRAVENOUS at 03:41

## 2020-01-01 RX ADMIN — MIDODRINE HYDROCHLORIDE 5 MILLIGRAM(S): 2.5 TABLET ORAL at 06:19

## 2020-01-01 RX ADMIN — PRIMIDONE 150 MILLIGRAM(S): 250 TABLET ORAL at 22:20

## 2020-01-01 RX ADMIN — Medication 650 MILLIGRAM(S): at 01:15

## 2020-01-01 RX ADMIN — Medication 650 MILLIGRAM(S): at 15:23

## 2020-01-01 RX ADMIN — VASOPRESSIN 2.4 UNIT(S)/MIN: 20 INJECTION INTRAVENOUS at 01:09

## 2020-01-01 RX ADMIN — CHLORHEXIDINE GLUCONATE 15 MILLILITER(S): 213 SOLUTION TOPICAL at 10:20

## 2020-01-01 RX ADMIN — Medication 1 MILLIGRAM(S): at 00:50

## 2020-01-01 RX ADMIN — HEPARIN SODIUM 7500 UNIT(S): 5000 INJECTION INTRAVENOUS; SUBCUTANEOUS at 22:19

## 2020-01-01 RX ADMIN — Medication 102 MILLIGRAM(S): at 09:12

## 2020-01-01 RX ADMIN — CHLORHEXIDINE GLUCONATE 15 MILLILITER(S): 213 SOLUTION TOPICAL at 22:43

## 2020-01-01 RX ADMIN — SODIUM CHLORIDE 1000 MILLILITER(S): 9 INJECTION INTRAMUSCULAR; INTRAVENOUS; SUBCUTANEOUS at 10:51

## 2020-01-01 RX ADMIN — PROPOFOL 16.3 MICROGRAM(S)/KG/MIN: 10 INJECTION, EMULSION INTRAVENOUS at 11:24

## 2020-01-01 RX ADMIN — POLYETHYLENE GLYCOL 3350 17 GRAM(S): 17 POWDER, FOR SOLUTION ORAL at 09:16

## 2020-01-01 RX ADMIN — PRIMIDONE 50 MILLIGRAM(S): 250 TABLET ORAL at 11:07

## 2020-01-01 RX ADMIN — PRIMIDONE 250 MILLIGRAM(S): 250 TABLET ORAL at 09:48

## 2020-01-01 RX ADMIN — FENTANYL CITRATE 2.27 MICROGRAM(S)/KG/HR: 50 INJECTION INTRAVENOUS at 05:36

## 2020-01-01 RX ADMIN — PROPOFOL 16.3 MICROGRAM(S)/KG/MIN: 10 INJECTION, EMULSION INTRAVENOUS at 01:53

## 2020-01-01 RX ADMIN — Medication 650 MILLIGRAM(S): at 10:22

## 2020-01-01 RX ADMIN — FENTANYL CITRATE 2.27 MICROGRAM(S)/KG/HR: 50 INJECTION INTRAVENOUS at 19:40

## 2020-01-01 RX ADMIN — Medication 100 MILLIGRAM(S): at 09:52

## 2020-01-01 RX ADMIN — PRIMIDONE 50 MILLIGRAM(S): 250 TABLET ORAL at 09:47

## 2020-01-01 RX ADMIN — Medication 650 MILLIGRAM(S): at 13:20

## 2020-01-01 RX ADMIN — PRIMIDONE 150 MILLIGRAM(S): 250 TABLET ORAL at 23:35

## 2020-01-01 RX ADMIN — Medication 2 MILLIGRAM(S): at 01:16

## 2020-01-01 RX ADMIN — Medication 650 MILLIGRAM(S): at 06:45

## 2020-01-01 RX ADMIN — CHLORHEXIDINE GLUCONATE 15 MILLILITER(S): 213 SOLUTION TOPICAL at 22:20

## 2020-01-01 RX ADMIN — Medication 6: at 06:09

## 2020-01-01 RX ADMIN — Medication 6: at 05:34

## 2020-01-01 RX ADMIN — Medication 81 MILLIGRAM(S): at 10:49

## 2020-01-01 RX ADMIN — LAMOTRIGINE 100 MILLIGRAM(S): 25 TABLET, ORALLY DISINTEGRATING ORAL at 09:16

## 2020-01-01 RX ADMIN — INSULIN GLARGINE 30 UNIT(S): 100 INJECTION, SOLUTION SUBCUTANEOUS at 23:18

## 2020-01-01 RX ADMIN — PROPOFOL 16.3 MICROGRAM(S)/KG/MIN: 10 INJECTION, EMULSION INTRAVENOUS at 00:06

## 2020-01-01 RX ADMIN — HEPARIN SODIUM 7500 UNIT(S): 5000 INJECTION INTRAVENOUS; SUBCUTANEOUS at 22:33

## 2020-01-01 RX ADMIN — MEROPENEM 100 MILLIGRAM(S): 1 INJECTION INTRAVENOUS at 15:23

## 2020-01-01 RX ADMIN — PROPOFOL 16.3 MICROGRAM(S)/KG/MIN: 10 INJECTION, EMULSION INTRAVENOUS at 17:13

## 2020-01-01 RX ADMIN — INSULIN GLARGINE 40 UNIT(S): 100 INJECTION, SOLUTION SUBCUTANEOUS at 22:56

## 2020-01-01 RX ADMIN — Medication 4: at 16:51

## 2020-01-01 RX ADMIN — AMLODIPINE BESYLATE 10 MILLIGRAM(S): 2.5 TABLET ORAL at 10:22

## 2020-01-01 RX ADMIN — Medication 102 MILLIGRAM(S): at 09:55

## 2020-01-01 RX ADMIN — Medication 650 MILLIGRAM(S): at 02:36

## 2020-01-01 RX ADMIN — PRIMIDONE 50 MILLIGRAM(S): 250 TABLET ORAL at 10:43

## 2020-01-01 RX ADMIN — Medication 6: at 11:59

## 2020-01-01 RX ADMIN — Medication 100 MILLIEQUIVALENT(S): at 14:17

## 2020-01-01 RX ADMIN — PROPOFOL 16.3 MICROGRAM(S)/KG/MIN: 10 INJECTION, EMULSION INTRAVENOUS at 10:48

## 2020-01-01 RX ADMIN — Medication 2: at 23:23

## 2020-01-01 RX ADMIN — Medication 4: at 12:36

## 2020-01-01 RX ADMIN — Medication 650 MILLIGRAM(S): at 17:09

## 2020-01-01 RX ADMIN — PROPOFOL 16.3 MICROGRAM(S)/KG/MIN: 10 INJECTION, EMULSION INTRAVENOUS at 12:12

## 2020-01-01 RX ADMIN — AMIODARONE HYDROCHLORIDE 33.3 MG/MIN: 400 TABLET ORAL at 01:09

## 2020-01-01 RX ADMIN — CHLORHEXIDINE GLUCONATE 15 MILLILITER(S): 213 SOLUTION TOPICAL at 11:07

## 2020-01-01 RX ADMIN — LAMOTRIGINE 100 MILLIGRAM(S): 25 TABLET, ORALLY DISINTEGRATING ORAL at 22:44

## 2020-01-01 RX ADMIN — Medication 650 MILLIGRAM(S): at 18:05

## 2020-01-01 RX ADMIN — LAMOTRIGINE 100 MILLIGRAM(S): 25 TABLET, ORALLY DISINTEGRATING ORAL at 21:32

## 2020-01-01 RX ADMIN — PRIMIDONE 150 MILLIGRAM(S): 250 TABLET ORAL at 23:19

## 2020-01-01 RX ADMIN — ENOXAPARIN SODIUM 40 MILLIGRAM(S): 100 INJECTION SUBCUTANEOUS at 22:38

## 2020-01-01 RX ADMIN — CEFTRIAXONE 1000 MILLIGRAM(S): 500 INJECTION, POWDER, FOR SOLUTION INTRAMUSCULAR; INTRAVENOUS at 05:14

## 2020-01-01 RX ADMIN — ENOXAPARIN SODIUM 40 MILLIGRAM(S): 100 INJECTION SUBCUTANEOUS at 23:18

## 2020-01-01 RX ADMIN — PRIMIDONE 150 MILLIGRAM(S): 250 TABLET ORAL at 22:44

## 2020-01-01 RX ADMIN — Medication 8: at 22:30

## 2020-01-01 RX ADMIN — ENOXAPARIN SODIUM 40 MILLIGRAM(S): 100 INJECTION SUBCUTANEOUS at 22:25

## 2020-01-01 RX ADMIN — PROPOFOL 16.3 MICROGRAM(S)/KG/MIN: 10 INJECTION, EMULSION INTRAVENOUS at 00:21

## 2020-01-01 RX ADMIN — CEFEPIME 1000 MILLIGRAM(S): 1 INJECTION, POWDER, FOR SOLUTION INTRAMUSCULAR; INTRAVENOUS at 13:14

## 2020-01-01 RX ADMIN — PRIMIDONE 250 MILLIGRAM(S): 250 TABLET ORAL at 10:21

## 2020-01-01 RX ADMIN — PRIMIDONE 150 MILLIGRAM(S): 250 TABLET ORAL at 23:14

## 2020-01-01 RX ADMIN — LAMOTRIGINE 100 MILLIGRAM(S): 25 TABLET, ORALLY DISINTEGRATING ORAL at 22:19

## 2020-01-01 RX ADMIN — CHLORHEXIDINE GLUCONATE 15 MILLILITER(S): 213 SOLUTION TOPICAL at 10:44

## 2020-01-01 RX ADMIN — Medication 8.5 MICROGRAM(S)/KG/MIN: at 12:05

## 2020-01-01 RX ADMIN — Medication 650 MILLIGRAM(S): at 22:27

## 2020-01-01 RX ADMIN — INSULIN GLARGINE 24 UNIT(S): 100 INJECTION, SOLUTION SUBCUTANEOUS at 22:43

## 2020-01-01 RX ADMIN — Medication 250 MILLIGRAM(S): at 05:38

## 2020-01-01 RX ADMIN — PROPOFOL 16.3 MICROGRAM(S)/KG/MIN: 10 INJECTION, EMULSION INTRAVENOUS at 12:39

## 2020-01-01 RX ADMIN — CHLORHEXIDINE GLUCONATE 15 MILLILITER(S): 213 SOLUTION TOPICAL at 09:47

## 2020-01-01 RX ADMIN — PROPOFOL 16.3 MICROGRAM(S)/KG/MIN: 10 INJECTION, EMULSION INTRAVENOUS at 18:09

## 2020-01-01 RX ADMIN — PRIMIDONE 50 MILLIGRAM(S): 250 TABLET ORAL at 10:20

## 2020-01-01 RX ADMIN — Medication 100 MILLIEQUIVALENT(S): at 18:53

## 2020-01-01 RX ADMIN — LAMOTRIGINE 100 MILLIGRAM(S): 25 TABLET, ORALLY DISINTEGRATING ORAL at 10:30

## 2020-01-01 RX ADMIN — FENTANYL CITRATE 2.27 MICROGRAM(S)/KG/HR: 50 INJECTION INTRAVENOUS at 00:19

## 2020-01-01 RX ADMIN — REMDESIVIR 500 MILLIGRAM(S): 5 INJECTION INTRAVENOUS at 10:21

## 2020-01-01 RX ADMIN — Medication 6 MILLIGRAM(S): at 12:29

## 2020-01-01 RX ADMIN — ENOXAPARIN SODIUM 40 MILLIGRAM(S): 100 INJECTION SUBCUTANEOUS at 10:21

## 2020-01-01 RX ADMIN — PRIMIDONE 50 MILLIGRAM(S): 250 TABLET ORAL at 09:09

## 2020-01-01 RX ADMIN — Medication 102 MILLIGRAM(S): at 10:25

## 2020-01-01 RX ADMIN — INSULIN GLARGINE 34 UNIT(S): 100 INJECTION, SOLUTION SUBCUTANEOUS at 22:41

## 2020-01-01 RX ADMIN — REMDESIVIR 500 MILLIGRAM(S): 5 INJECTION INTRAVENOUS at 10:14

## 2020-01-01 RX ADMIN — Medication 6: at 17:42

## 2020-01-01 RX ADMIN — Medication 2: at 18:10

## 2020-01-01 RX ADMIN — FAMOTIDINE 20 MILLIGRAM(S): 10 INJECTION INTRAVENOUS at 10:30

## 2020-01-01 RX ADMIN — Medication 650 MILLIGRAM(S): at 12:24

## 2020-01-01 RX ADMIN — FAMOTIDINE 20 MILLIGRAM(S): 10 INJECTION INTRAVENOUS at 10:23

## 2020-01-01 RX ADMIN — Medication 2: at 17:13

## 2020-01-01 RX ADMIN — PROPOFOL 16.3 MICROGRAM(S)/KG/MIN: 10 INJECTION, EMULSION INTRAVENOUS at 11:00

## 2020-01-01 RX ADMIN — HEPARIN SODIUM 7500 UNIT(S): 5000 INJECTION INTRAVENOUS; SUBCUTANEOUS at 13:01

## 2020-01-01 RX ADMIN — INSULIN GLARGINE 40 UNIT(S): 100 INJECTION, SOLUTION SUBCUTANEOUS at 23:22

## 2020-01-01 RX ADMIN — INSULIN GLARGINE 24 UNIT(S): 100 INJECTION, SOLUTION SUBCUTANEOUS at 13:37

## 2020-01-01 RX ADMIN — POLYETHYLENE GLYCOL 3350 17 GRAM(S): 17 POWDER, FOR SOLUTION ORAL at 10:41

## 2020-01-01 RX ADMIN — PRIMIDONE 250 MILLIGRAM(S): 250 TABLET ORAL at 09:06

## 2020-01-01 RX ADMIN — POLYETHYLENE GLYCOL 3350 17 GRAM(S): 17 POWDER, FOR SOLUTION ORAL at 11:06

## 2020-01-01 RX ADMIN — Medication 4: at 10:03

## 2020-01-01 RX ADMIN — Medication 650 MILLIGRAM(S): at 23:22

## 2020-01-01 RX ADMIN — Medication 20 MILLIGRAM(S): at 10:41

## 2020-01-01 RX ADMIN — MIDODRINE HYDROCHLORIDE 5 MILLIGRAM(S): 2.5 TABLET ORAL at 05:47

## 2020-01-01 RX ADMIN — Medication 650 MILLIGRAM(S): at 06:10

## 2020-01-01 RX ADMIN — FAMOTIDINE 20 MILLIGRAM(S): 10 INJECTION INTRAVENOUS at 22:33

## 2020-01-01 RX ADMIN — FAMOTIDINE 20 MILLIGRAM(S): 10 INJECTION INTRAVENOUS at 21:33

## 2020-01-01 RX ADMIN — INSULIN GLARGINE 18 UNIT(S): 100 INJECTION, SOLUTION SUBCUTANEOUS at 09:13

## 2020-01-01 RX ADMIN — Medication 6: at 21:59

## 2020-01-01 RX ADMIN — INSULIN GLARGINE 30 UNIT(S): 100 INJECTION, SOLUTION SUBCUTANEOUS at 08:57

## 2020-01-01 RX ADMIN — Medication 40 MILLIEQUIVALENT(S): at 17:32

## 2020-01-01 RX ADMIN — PROPOFOL 16.3 MICROGRAM(S)/KG/MIN: 10 INJECTION, EMULSION INTRAVENOUS at 20:39

## 2020-01-01 RX ADMIN — LAMOTRIGINE 100 MILLIGRAM(S): 25 TABLET, ORALLY DISINTEGRATING ORAL at 23:19

## 2020-01-01 RX ADMIN — PROPOFOL 16.3 MICROGRAM(S)/KG/MIN: 10 INJECTION, EMULSION INTRAVENOUS at 15:21

## 2020-01-01 RX ADMIN — Medication 100 MILLIEQUIVALENT(S): at 16:38

## 2020-01-01 RX ADMIN — Medication 10 MILLILITER(S): at 11:21

## 2020-01-01 RX ADMIN — PRIMIDONE 50 MILLIGRAM(S): 250 TABLET ORAL at 09:17

## 2020-01-01 RX ADMIN — CHLORHEXIDINE GLUCONATE 15 MILLILITER(S): 213 SOLUTION TOPICAL at 09:16

## 2020-01-01 RX ADMIN — MORPHINE SULFATE 2 MILLIGRAM(S): 50 CAPSULE, EXTENDED RELEASE ORAL at 14:05

## 2020-01-01 RX ADMIN — FAMOTIDINE 20 MILLIGRAM(S): 10 INJECTION INTRAVENOUS at 23:18

## 2020-01-01 RX ADMIN — Medication 102 MILLIGRAM(S): at 10:48

## 2020-01-01 RX ADMIN — LAMOTRIGINE 100 MILLIGRAM(S): 25 TABLET, ORALLY DISINTEGRATING ORAL at 22:38

## 2020-01-01 RX ADMIN — SIMVASTATIN 40 MILLIGRAM(S): 20 TABLET, FILM COATED ORAL at 23:35

## 2020-01-01 RX ADMIN — LAMOTRIGINE 100 MILLIGRAM(S): 25 TABLET, ORALLY DISINTEGRATING ORAL at 23:35

## 2020-01-01 RX ADMIN — Medication 6: at 17:08

## 2020-01-01 RX ADMIN — Medication 20 MILLIGRAM(S): at 12:37

## 2020-01-01 RX ADMIN — HEPARIN SODIUM 7500 UNIT(S): 5000 INJECTION INTRAVENOUS; SUBCUTANEOUS at 14:37

## 2020-01-01 RX ADMIN — PROPOFOL 16.3 MICROGRAM(S)/KG/MIN: 10 INJECTION, EMULSION INTRAVENOUS at 00:29

## 2020-01-01 RX ADMIN — PROPOFOL 16.3 MICROGRAM(S)/KG/MIN: 10 INJECTION, EMULSION INTRAVENOUS at 11:34

## 2020-01-01 RX ADMIN — ENOXAPARIN SODIUM 40 MILLIGRAM(S): 100 INJECTION SUBCUTANEOUS at 09:11

## 2020-01-01 RX ADMIN — Medication 100 MILLIEQUIVALENT(S): at 06:51

## 2020-01-01 RX ADMIN — Medication 8: at 23:18

## 2020-01-01 RX ADMIN — FAMOTIDINE 20 MILLIGRAM(S): 10 INJECTION INTRAVENOUS at 10:49

## 2020-01-01 RX ADMIN — PRIMIDONE 50 MILLIGRAM(S): 250 TABLET ORAL at 10:49

## 2020-01-01 RX ADMIN — INSULIN GLARGINE 15 UNIT(S): 100 INJECTION, SOLUTION SUBCUTANEOUS at 21:11

## 2020-01-01 RX ADMIN — LAMOTRIGINE 100 MILLIGRAM(S): 25 TABLET, ORALLY DISINTEGRATING ORAL at 09:09

## 2020-01-01 RX ADMIN — MEROPENEM 100 MILLIGRAM(S): 1 INJECTION INTRAVENOUS at 23:35

## 2020-01-01 RX ADMIN — Medication 81 MILLIGRAM(S): at 09:10

## 2020-01-01 RX ADMIN — Medication 102 MILLIGRAM(S): at 09:07

## 2020-01-01 RX ADMIN — SODIUM CHLORIDE 1000 MILLILITER(S): 9 INJECTION INTRAMUSCULAR; INTRAVENOUS; SUBCUTANEOUS at 09:51

## 2020-01-01 RX ADMIN — FAMOTIDINE 20 MILLIGRAM(S): 10 INJECTION INTRAVENOUS at 09:48

## 2020-01-01 RX ADMIN — Medication 650 MILLIGRAM(S): at 03:41

## 2020-01-01 RX ADMIN — PRIMIDONE 250 MILLIGRAM(S): 250 TABLET ORAL at 10:43

## 2020-01-01 RX ADMIN — SIMVASTATIN 40 MILLIGRAM(S): 20 TABLET, FILM COATED ORAL at 22:27

## 2020-01-01 RX ADMIN — Medication 20 MILLIGRAM(S): at 09:07

## 2020-01-01 RX ADMIN — Medication 650 MILLIGRAM(S): at 21:31

## 2020-01-01 RX ADMIN — Medication 650 MILLIGRAM(S): at 10:15

## 2020-01-01 RX ADMIN — FAMOTIDINE 20 MILLIGRAM(S): 10 INJECTION INTRAVENOUS at 23:36

## 2020-01-01 RX ADMIN — Medication 650 MILLIGRAM(S): at 06:19

## 2020-01-01 RX ADMIN — SIMVASTATIN 40 MILLIGRAM(S): 20 TABLET, FILM COATED ORAL at 22:20

## 2020-01-01 RX ADMIN — PROPOFOL 16.3 MICROGRAM(S)/KG/MIN: 10 INJECTION, EMULSION INTRAVENOUS at 20:33

## 2020-01-01 RX ADMIN — Medication 8: at 23:14

## 2020-01-01 RX ADMIN — Medication 81 MILLIGRAM(S): at 09:47

## 2020-01-01 RX ADMIN — Medication 650 MILLIGRAM(S): at 14:42

## 2020-01-01 RX ADMIN — PROPOFOL 16.3 MICROGRAM(S)/KG/MIN: 10 INJECTION, EMULSION INTRAVENOUS at 20:19

## 2020-01-01 RX ADMIN — CISATRACURIUM BESYLATE 16.3 MICROGRAM(S)/KG/MIN: 2 INJECTION INTRAVENOUS at 22:44

## 2020-01-01 RX ADMIN — FENTANYL CITRATE 2.27 MICROGRAM(S)/KG/HR: 50 INJECTION INTRAVENOUS at 00:00

## 2020-01-01 RX ADMIN — AMLODIPINE BESYLATE 10 MILLIGRAM(S): 2.5 TABLET ORAL at 09:07

## 2020-01-01 RX ADMIN — PROPOFOL 16.3 MICROGRAM(S)/KG/MIN: 10 INJECTION, EMULSION INTRAVENOUS at 23:23

## 2020-01-01 RX ADMIN — Medication 8: at 22:44

## 2020-01-01 RX ADMIN — Medication 650 MILLIGRAM(S): at 16:34

## 2020-01-01 RX ADMIN — PROPOFOL 16.3 MICROGRAM(S)/KG/MIN: 10 INJECTION, EMULSION INTRAVENOUS at 03:51

## 2020-01-01 RX ADMIN — Medication 650 MILLIGRAM(S): at 06:33

## 2020-01-01 RX ADMIN — Medication 4 UNIT(S): at 01:29

## 2020-01-01 RX ADMIN — CISATRACURIUM BESYLATE 10.9 MICROGRAM(S)/KG/MIN: 2 INJECTION INTRAVENOUS at 19:00

## 2020-01-01 RX ADMIN — FAMOTIDINE 20 MILLIGRAM(S): 10 INJECTION INTRAVENOUS at 10:41

## 2020-01-01 RX ADMIN — Medication 650 MILLIGRAM(S): at 20:41

## 2020-01-01 RX ADMIN — PROPOFOL 16.3 MICROGRAM(S)/KG/MIN: 10 INJECTION, EMULSION INTRAVENOUS at 21:00

## 2020-01-01 RX ADMIN — Medication 81 MILLIGRAM(S): at 09:17

## 2020-01-01 RX ADMIN — Medication 4: at 12:21

## 2020-01-01 RX ADMIN — POLYETHYLENE GLYCOL 3350 17 GRAM(S): 17 POWDER, FOR SOLUTION ORAL at 10:48

## 2020-01-01 RX ADMIN — FAMOTIDINE 20 MILLIGRAM(S): 10 INJECTION INTRAVENOUS at 22:38

## 2020-01-01 RX ADMIN — Medication 6: at 13:49

## 2020-01-01 RX ADMIN — Medication 20 MILLIGRAM(S): at 09:17

## 2020-01-01 RX ADMIN — REMDESIVIR 500 MILLIGRAM(S): 5 INJECTION INTRAVENOUS at 10:48

## 2020-01-01 RX ADMIN — PRIMIDONE 150 MILLIGRAM(S): 250 TABLET ORAL at 21:30

## 2020-01-01 RX ADMIN — Medication 4.25 MICROGRAM(S)/KG/MIN: at 12:31

## 2020-01-01 RX ADMIN — Medication 650 MILLIGRAM(S): at 21:15

## 2020-01-01 RX ADMIN — CHLORHEXIDINE GLUCONATE 15 MILLILITER(S): 213 SOLUTION TOPICAL at 22:33

## 2020-01-01 RX ADMIN — SIMVASTATIN 40 MILLIGRAM(S): 20 TABLET, FILM COATED ORAL at 22:39

## 2020-01-01 RX ADMIN — Medication 6: at 09:24

## 2020-01-01 RX ADMIN — PROPOFOL 16.3 MICROGRAM(S)/KG/MIN: 10 INJECTION, EMULSION INTRAVENOUS at 14:34

## 2020-01-01 RX ADMIN — Medication 81 MILLIGRAM(S): at 10:41

## 2020-01-01 RX ADMIN — Medication 102 MILLIGRAM(S): at 13:36

## 2020-01-01 RX ADMIN — SIMVASTATIN 40 MILLIGRAM(S): 20 TABLET, FILM COATED ORAL at 21:31

## 2020-01-01 RX ADMIN — PROPOFOL 16.3 MICROGRAM(S)/KG/MIN: 10 INJECTION, EMULSION INTRAVENOUS at 08:14

## 2020-01-01 RX ADMIN — SIMVASTATIN 40 MILLIGRAM(S): 20 TABLET, FILM COATED ORAL at 22:44

## 2020-01-01 RX ADMIN — PRIMIDONE 50 MILLIGRAM(S): 250 TABLET ORAL at 10:30

## 2020-01-01 RX ADMIN — Medication 4.25 MICROGRAM(S)/KG/MIN: at 15:44

## 2020-01-01 RX ADMIN — Medication 650 MILLIGRAM(S): at 10:31

## 2020-01-01 RX ADMIN — PROPOFOL 16.3 MICROGRAM(S)/KG/MIN: 10 INJECTION, EMULSION INTRAVENOUS at 05:15

## 2020-01-01 RX ADMIN — Medication 650 MILLIGRAM(S): at 06:22

## 2020-01-01 RX ADMIN — LAMOTRIGINE 100 MILLIGRAM(S): 25 TABLET, ORALLY DISINTEGRATING ORAL at 10:44

## 2020-01-01 RX ADMIN — CHLORHEXIDINE GLUCONATE 15 MILLILITER(S): 213 SOLUTION TOPICAL at 23:36

## 2020-01-01 RX ADMIN — PRIMIDONE 150 MILLIGRAM(S): 250 TABLET ORAL at 22:36

## 2020-01-01 RX ADMIN — Medication 6: at 12:15

## 2020-01-01 RX ADMIN — INSULIN GLARGINE 10 UNIT(S): 100 INJECTION, SOLUTION SUBCUTANEOUS at 10:03

## 2020-01-01 RX ADMIN — Medication 101.6 MILLIGRAM(S): at 10:06

## 2020-01-01 RX ADMIN — HEPARIN SODIUM 5000 UNIT(S): 5000 INJECTION INTRAVENOUS; SUBCUTANEOUS at 15:19

## 2020-01-01 RX ADMIN — Medication 2: at 06:51

## 2020-01-01 RX ADMIN — FAMOTIDINE 20 MILLIGRAM(S): 10 INJECTION INTRAVENOUS at 09:17

## 2020-01-01 RX ADMIN — Medication 20 MILLIGRAM(S): at 10:49

## 2020-01-01 RX ADMIN — FENTANYL CITRATE 2.27 MICROGRAM(S)/KG/HR: 50 INJECTION INTRAVENOUS at 09:58

## 2020-01-01 RX ADMIN — PRIMIDONE 50 MILLIGRAM(S): 250 TABLET ORAL at 09:06

## 2020-01-01 RX ADMIN — AMLODIPINE BESYLATE 10 MILLIGRAM(S): 2.5 TABLET ORAL at 09:10

## 2020-01-01 RX ADMIN — PROPOFOL 16.3 MICROGRAM(S)/KG/MIN: 10 INJECTION, EMULSION INTRAVENOUS at 22:13

## 2020-01-01 RX ADMIN — PRIMIDONE 250 MILLIGRAM(S): 250 TABLET ORAL at 09:09

## 2020-01-01 RX ADMIN — REMDESIVIR 500 MILLIGRAM(S): 5 INJECTION INTRAVENOUS at 09:24

## 2020-01-01 RX ADMIN — SENNA PLUS 2 TABLET(S): 8.6 TABLET ORAL at 23:35

## 2020-01-01 RX ADMIN — CEFTRIAXONE 1000 MILLIGRAM(S): 500 INJECTION, POWDER, FOR SOLUTION INTRAMUSCULAR; INTRAVENOUS at 05:49

## 2020-01-07 PROBLEM — N39.44 NOCTURNAL ENURESIS: Status: ACTIVE | Noted: 2019-10-23

## 2020-12-12 NOTE — ED ADULT NURSE REASSESSMENT NOTE - NS ED NURSE REASSESS COMMENT FT1
Patient received from previous nurse. Pt is A&Ox4 lethargic will fall asleep while talking, VSS on 5L of O2 NC. Pt is resting comfortably in their bed at this time, denies any pain or discomfort at this time. COVID results pending. Safety and comfort measures in place. Hourly rounding will be done on my time. Will continue to monitor.

## 2020-12-12 NOTE — H&P ADULT - NSHPLABSRESULTS_GEN_ALL_CORE
LABS: All Labs Reviewed:                        13.1   6.49  )-----------( 173      ( 12 Dec 2020 09:21 )             40.3     12-12    133<L>  |  96  |  15  ----------------------------<  263<H>  3.0<L>   |  29  |  0.89    Ca    8.7      12 Dec 2020 09:21    TPro  7.9  /  Alb  3.1<L>  /  TBili  0.6  /  DBili  x   /  AST  39<H>  /  ALT  34  /  AlkPhos  33<L>  12-12    PT/INR - ( 12 Dec 2020 09:21 )   PT: 14.5 sec;   INR: 1.25 ratio         PTT - ( 12 Dec 2020 09:21 )  PTT:31.1 sec          Blood Culture:

## 2020-12-12 NOTE — H&P ADULT - NSHPPHYSICALEXAM_GEN_ALL_CORE
ICU Vital Signs Last 24 Hrs  T(C): 37.3 (12 Dec 2020 12:32), Max: 38.8 (12 Dec 2020 09:48)  T(F): 99.2 (12 Dec 2020 12:32), Max: 101.9 (12 Dec 2020 09:48)  HR: 80 (12 Dec 2020 12:32) (80 - 99)  BP: 130/60 (12 Dec 2020 12:32) (130/60 - 158/71)  BP(mean): 78 (12 Dec 2020 09:48) (78 - 93)  ABP: --  ABP(mean): --  RR: 23 (12 Dec 2020 12:32) (23 - 30)  SpO2: 93% (12 Dec 2020 12:32) (78% - 95%)      PHYSICAL EXAM:    Constitutional: NAD, lethargic but arousable; not using accessory muscles. on 4L  HEENT: PERR, EOMI, Normal Hearing, MMM  Neck: Soft and supple, No LAD, No JVD  Respiratory: decreased bs b/l  Cardiovascular: S1 and S2, regular rate and rhythm, no Murmurs, gallops or rubs  Gastrointestinal: Bowel Sounds present, soft, nontender, nondistended, no guarding, no rebound  Extremities: No peripheral edema  Vascular: 2+ peripheral pulses  Neurological: A/O x 3, no focal deficits  Musculoskeletal: 5/5 strength b/l upper and lower extremities  Skin: No rashes

## 2020-12-12 NOTE — ED ADULT NURSE NOTE - CAS TRG GEN SKIN CONDITION
Jose Luis Santosh Brewer  1942     HISTORY OF PRESENT ILLNESS  Violet Galeana is a 76 y.o. female who presents today for evaluation s/p Right reverse total shoulder arthroplasty on 7/31/19. Describes pain as a 6/10 today. She has been going to PT with minimal relief with pain and ROM. Still has significant pain and limited ROM today. She notes that on 12/12/19 she reached to grab a piece of bread out of the microwave and heard a \"pop\" in her shoulder. Has tried taking Tylenol with no relief. Patient denies any fever, chills, chest pain, shortness of breath or calf pain. The remainder of the review of systems is negative. There are no new illness or injuries to report since last seen in the office. No changes in medications, allergies, social or family history. Pain Assessment  1/9/2020   Location of Pain Shoulder   Location Modifiers Right   Severity of Pain 6   Quality of Pain Sharp   Quality of Pain Comment -   Duration of Pain Persistent   Duration of Pain Comment -   Frequency of Pain Constant   Aggravating Factors (No Data)   Aggravating Factors Comment Movement, everything   Limiting Behavior -   Relieving Factors Rest;Ice;Heat   Relieving Factors Comment -   Result of Injury No       PHYSICAL EXAM:   Visit Vitals  /61   Pulse 60   Temp 97.3 °F (36.3 °C) (Oral)   Resp 16   Ht 5' 5\" (1.651 m)   Wt 126 lb 6.4 oz (57.3 kg)   LMP  (LMP Unknown)   SpO2 97%   BMI 21.03 kg/m²      The patient is a well-developed, well-nourished female in no acute distress. The patient is alert and oriented times three. The patient appears to be well groomed. Mood and affect are normal.  LYMPHATIC: lymph nodes are not enlarged and are within normal limits  SKIN: normal in color and non tender to palpation. There are no bruises or abrasions noted. NEUROLOGICAL: Motor sensory exam is within normal limits. Reflexes are equal bilaterally.  There is normal sensation to pinprick and light touch  ORTHOPEDIC EXAM of right shoulder:  Inspection: swelling present,  Bruising present, swelling to right hand and fingers - with some improvement  Incision well healed  Passive glenohumeral abduction 0-30 degrees with pain, able to make active passive fist  Stability: Stable  Strength: n/a  2+ distal pulses    IMAGING: CT of right shoulder with contrast dated 12/24/19 was reviewed and read by Dr. Miller Argue:   IMPRESSION:  1. Mild inferior glenoid notching. No significant loosening or fracture  appreciated. 2.  Small full thickness tear of the supraspinatus. Mild to moderate atrophy. 3.  Small amount of contrast external to the subscapularis noted at the lesser  tuberosity which may be related to small defect or leakage during placement. No  large tear appreciated. Significant atrophy. Doppler RUE: no evidence of DVT    IMPRESSION:  S/P Right reverse total shoulder arthroplasty    Encounter Diagnosis   Name Primary?  Rotator cuff arthropathy, right Yes         PLAN:   1. I discussed the risks and benefits and potential adverse outcomes of both operative vs non operative treatment of  S/P Right reverse total shoulder arthroplasty with the patient. Patient wishes to proceed with right shoulder arthroscopic lysis of adhesions and debridement. Risks of operative intervention include but not limited to bleeding, infection, deep vein thrombosis, pulmonary embolism, death, limb length discrepancy, reflexive sympathetic dystrophy, fat embolism syndrome,damage to blood vessels and nerves, malunion, non-union, delayed union, failure of hardware, post traumatic arthritis, stroke, heart attack, and death. Patient understands that infection may arise and may require numerous surgeries. History and physical exam done in the office today. Risk factors include: htn  2. No ultrasound exam indicated today  3. No cortisone injection indicated today   4.  No Physical/Occupational Therapy indicated today  5. No diagnostic test indicated today  6. No durable medical equipment indicated today  7. No referral indicated today   8. No medications indicated today:   9. No Narcotic indicated today      RTC post-op    Scribed by Julian Beltre) as dictated by MD LIANG Gilliland, Dr. Eladio Bedolla, confirm that all documentation is accurate.     Eladio Bedolla M.D.   [phone]

## 2020-12-12 NOTE — ED PROVIDER NOTE - CLINICAL SUMMARY MEDICAL DECISION MAKING FREE TEXT BOX
71 y/o white F, hx of HTN, HLD, DM, mild cognitive impairment, +home COVID exposure (daughter/) BIBA with 1 week of flu type sx, couple days of progressive SOB. Pt acutely ill, mild respiratory distress, hypoxic, RA pulse ox low 80s. Plan; sepsis, alert initiated fr COVID restrictions; EKG, CXR, labs including pan cultures, COVID swab, COVID serum inflammatory markers, lactate, O2 NC supplement, Tylenol PO, Tessalon PO, COVID precautions, 1 L IV fluids, sepsis IV fluids contraindicated of suspected COVID disease.

## 2020-12-12 NOTE — ED PROVIDER NOTE - PMH
DM (diabetes mellitus)    HLD (hyperlipidemia)    HTN (hypertension)    Mild cognitive impairment

## 2020-12-12 NOTE — ED ADULT NURSE NOTE - OBJECTIVE STATEMENT
c/o difficulty breathing, cough, poor appetite for past week, c/o tarry stool yesterday, patient's daughter tested positive for covid and admitted to hospital

## 2020-12-12 NOTE — H&P ADULT - NSHPREVIEWOFSYSTEMS_GEN_ALL_CORE
REVIEW OF SYSTEMS:    CONSTITUTIONAL: +fever to 101.9 in Ed  EYES/ENT: No visual changes;  No vertigo or throat pain   NECK: No pain or stiffness  RESPIRATORY: + cough and shortness of breath  CARDIOVASCULAR: No chest pain or palpitations  GASTROINTESTINAL: +diarrhea  GENITOURINARY: No dysuria, frequency or hematuria  NEUROLOGICAL: No numbness or weakness  SKIN: No itching, burning, rashes, or lesions   All other review of systems is negative unless indicated above

## 2020-12-12 NOTE — H&P ADULT - HISTORY OF PRESENT ILLNESS
69 yo obese female with pmh niddm, htn, mild cognitive impairment, ?GALE/OHS, presents with complaints of 1 week onset of productive cough, weakness, mylagia, lethargy, diarrhea and shortness of breath. Daughter and  both have covid and live with her. Daughter is currently hospitalized for covid pna. Pt is lethargic but arousable on command. Requiring 4-5 L NC saturating 94%. Tachypneic but not using acceossry muscles. History limited due to condition. Vital stable.  CXR c/w bilateral patchy infiltrates. Elevated inflammatory markers. COVID pending however strongly suspicious with these findings        Past Medical History:  DM (diabetes mellitus)    HLD (hyperlipidemia)    HTN (hypertension)    Mild cognitive impairment.     Tobacco Usage:  · Tobacco Usage	Former smoker    Shx: no major surgeries  Fhx: no cardiopulmonary issues in first degree relatives per patient    GOC: PT wants to be FULL CODE. - Was not able to sign molst due to extreme weakness however gave verbal consent in presence of nursing staff and molst signed by MD

## 2020-12-12 NOTE — ED PROVIDER NOTE - OBJECTIVE STATEMENT
71 y/o female with a PMHx of HTN, DM, mild cognitive impairment, HLD, MI, tremor, UTI, presents to the ED c/o generalized weakness, fever, productive cough with "greenish" sputum, decrease appetite, diffuse myalgia x 1 week. Also reports couple of days of worsening shortness of breath. Pt lives at home with daughter and  who are both COVID +, pt's daughter is admitted to hospital.

## 2020-12-12 NOTE — ED PROVIDER NOTE - PROGRESS NOTE DETAILS
Marcin Sequeira: Stool guaiac performed by Dr. Sequeira. Lot #: [id number] expiration 09/20/2021; Result: liquid brown stool in vault, guaiac negative; QC passed Marcin Sequeira: Stool guaiac performed by Dr. Sequeira. Lot #: [id number] expiration 09/30/2021; Result: liquid brown stool in vault, guaiac negative; QC passed

## 2020-12-12 NOTE — ED PROVIDER NOTE - CARE PLAN
Principal Discharge DX:	Viral pneumonia  Secondary Diagnosis:	Suspected 2019-nCoV infection  Secondary Diagnosis:	Acute UTI (urinary tract infection)  Secondary Diagnosis:	Hypoxia

## 2020-12-12 NOTE — ED ADULT TRIAGE NOTE - CHIEF COMPLAINT QUOTE
Pt. to the ED BIBA from home C/O Cough, Failure to Thrive, Chills and Tarry Stool. Pt. reports daughter being Positive for Covid and admitted in Hospital. Hx. of MI, DM and HTN

## 2020-12-12 NOTE — H&P ADULT - ASSESSMENT
#AHRF  #viral pna suspect COVID pna  #UTI  #NIDDM        P:  - Admit to MS  - Pulseox q4h  - O2 supplemental and maintain SpO2 between 88-94%  - if requiring >8L NC-> switch to 100% NRB or HFNC  - IF still not responding to above or having increased work of breathing-> trial of bipap in negative pressure isolation room  - IV decadron 6mg IV qd for 5-10 days  - ID consult for poss remdesivir/convalescent plasma. If covid PCR + and meets criteria-> give first dose of remdesivir 200mg IV x 1  - obtain COVID abx  - Obtain baseline and inflammatory markers. Monitor Q72hrs  - Limit use of NSAIDs  - Albuterol MDI to limit aerosolization  - holding oral hypoglycemics for now. Start ISS  - Resume home BP meds  - DVT px: COVID patients to be start on LMWH as per recent data supporting hypercoagubale state especially with high dimers with no absolute CI to its use ie GI bleed or other stigmata of bleeding within last 3 months or PLTs < 50      FULL CODE  MOLST in chart         #AHRF  #Sepsis - POA  #viral pna suspect COVID pna  #UTI  #NIDDM        P:  - Admit to MS  - Pulseox q4h  - O2 supplemental and maintain SpO2 between 88-94%  - if requiring >8L NC-> switch to 100% NRB or HFNC  - IF still not responding to above or having increased work of breathing-> trial of bipap in negative pressure isolation room  - IV decadron 6mg IV qd for 5-10 days  - ID consult for poss remdesivir/convalescent plasma. If covid PCR + and meets criteria-> give first dose of remdesivir 200mg IV x 1  - obtain COVID abx  - Obtain baseline and inflammatory markers. Monitor Q72hrs  - Limit use of NSAIDs  - Albuterol MDI to limit aerosolization  - ceftriaxone. F/U ucx  - holding oral hypoglycemics for now. Start ISS  - Resume home BP meds  - DVT px: COVID patients to be start on LMWH as per recent data supporting hypercoagubale state especially with high dimers with no absolute CI to its use ie GI bleed or other stigmata of bleeding within last 3 months or PLTs < 50      FULL CODE  MOLST in chart

## 2020-12-13 NOTE — CONSULT NOTE ADULT - ASSESSMENT
69 yo obese female with pmh niddm, htn, mild cognitive impairment, ?GALE/OHS, presents with complaints of 1 week onset of productive cough, weakness, mylagia, lethargy, diarrhea and shortness of breath. Daughter and  both have covid and live with her. Daughter is currently hospitalized for covid pna. Pt is lethargic but arousable on command. Requiring 4-5 L NC saturating 94%. Tachypneic but not using accessory muscles. History limited due to condition. Vital stable. CXR c/w bilateral patchy infiltrates. Elevated inflammatory markers. Here UA positive, concern for UTI, covid-19 pcr positive, was given IV cefepime/decadron/remdesivir, tx to CCU, hypoxic on NC, now on high flow o2.    1. acute respiratory failure. covid-19 viral syndrome. multifocal pneumonia. UTI  - agree with IV rocephin 1gm daily s/p cefepime #1 for UTI  - fu urine cx, blood cx 1 bottle CONS - c/w contaminant  - on remdesivir #2 monitor renal/hepatic function closely on therapy  - on decadron 6mg daily #2  - continue with antiviral, steroids/abx  - isolation precautions  - fu cbc  - supportive care  - tolerating abx well so far; no side effects noted  - reason for abx use and side effects reviewed with patient  - supportive care    2. other issues - care per medicine  71 yo obese female with pmh niddm, htn, mild cognitive impairment, ?GALE/OHS, presents with complaints of 1 week onset of productive cough, weakness, mylagia, lethargy, diarrhea and shortness of breath. Daughter and  both have covid and live with her. Daughter is currently hospitalized for covid pna. Pt is lethargic but arousable on command. Requiring 4-5 L NC saturating 94%. Tachypneic but not using accessory muscles. History limited due to condition. Vital stable. CXR c/w bilateral patchy infiltrates. Elevated inflammatory markers. Here UA positive, concern for UTI, covid-19 pcr positive, was given IV cefepime/decadron/remdesivir, tx to CCU, hypoxic on NC, now on high flow o2.    1. acute respiratory failure. covid-19 viral syndrome. multifocal pneumonia. UTI  - agree with IV rocephin 1gm daily s/p cefepime #1 for UTI  - fu urine cx, blood cx 1 bottle CONS - c/w contaminant  - start remdesivir per protocol; monitor renal/hepatic function closely on therapy  - on decadron 6mg daily #2  - continue with antiviral, steroids/abx  - isolation precautions  - fu cbc  - supportive care  - tolerating abx well so far; no side effects noted  - reason for abx use and side effects reviewed with patient  - supportive care    2. other issues - care per medicine

## 2020-12-13 NOTE — CONSULT NOTE ADULT - SUBJECTIVE AND OBJECTIVE BOX
Patient is a 70y old  Female who presents with a chief complaint of sob and cough (13 Dec 2020 06:26)    HPI:  69 yo obese female with pmh niddm, htn, mild cognitive impairment, ?GALE/OHS, presents with complaints of 1 week onset of productive cough, weakness, mylagia, lethargy, diarrhea and shortness of breath. Daughter and  both have covid and live with her. Daughter is currently hospitalized for covid pna. Pt is lethargic but arousable on command. Requiring 4-5 L NC saturating 94%. Tachypneic but not using accessory muscles. History limited due to condition. Vital stable. CXR c/w bilateral patchy infiltrates. Elevated inflammatory markers. Here UA positive, concern for UTI, covid-19 pcr positive, was given IV cefepime/decadron/remdesivir, tx to CCU, hypoxic on NC, now on high flow o2.      Past Medical History:  DM (diabetes mellitus)    HLD (hyperlipidemia)    HTN (hypertension)    Mild cognitive impairment.    PMH: as above    Meds: per reconciliation sheet, noted below  MEDICATIONS  (STANDING):  amLODIPine   Tablet 10 milliGRAM(s) Oral daily  aspirin  chewable 81 milliGRAM(s) Oral daily  cefTRIAXone Injectable. 1000 milliGRAM(s) IV Push every 24 hours  dexAMETHasone  IVPB 10 milliGRAM(s) IV Intermittent daily  dextrose 40% Gel 15 Gram(s) Oral once  dextrose 5%. 1000 milliLiter(s) (50 mL/Hr) IV Continuous <Continuous>  dextrose 50% Injectable 25 Gram(s) IV Push once  enoxaparin Injectable 40 milliGRAM(s) SubCutaneous every 12 hours  furosemide    Tablet 20 milliGRAM(s) Oral daily  glucagon  Injectable 1 milliGRAM(s) IntraMuscular once  insulin glargine Injectable (LANTUS) 10 Unit(s) SubCutaneous every morning  insulin lispro (ADMELOG) corrective regimen sliding scale   SubCutaneous three times a day before meals  insulin lispro (ADMELOG) corrective regimen sliding scale   SubCutaneous at bedtime  lamoTRIgine 100 milliGRAM(s) Oral two times a day  lisinopril 40 milliGRAM(s) Oral daily  PARoxetine 20 milliGRAM(s) Oral daily  primidone 250 milliGRAM(s) Oral daily  primidone 50 milliGRAM(s) Oral daily  primidone 150 milliGRAM(s) Oral at bedtime  remdesivir  IVPB   IV Intermittent   simvastatin 40 milliGRAM(s) Oral at bedtime    Allergies    No Known Allergies    Intolerances      Social: no smoking, no alcohol, no illegal drugs; no recent travel, no exposure to TB  FAMILY HISTORY:     no history of premature cardiovascular disease in first degree relatives    ROS: no HA, no dizziness, no sore throat, no blurry vision, no CP, no palpitations, no abdominal pain, no diarrhea, no N/V, no leg pain, no claudication, no rash, no joint aches, no rectal pain or bleeding, no night sweats  All other systems reviewed and are negative    Vital Signs Last 24 Hrs  T(C): 39.1 (13 Dec 2020 10:46), Max: 39.6 (13 Dec 2020 06:30)  T(F): 102.4 (13 Dec 2020 10:46), Max: 103.3 (13 Dec 2020 06:30)  HR: 89 (13 Dec 2020 08:00) (58 - 121)  BP: 140/62 (13 Dec 2020 08:00) (128/53 - 156/97)  BP(mean): 81 (13 Dec 2020 08:00) (81 - 111)  RR: 29 (13 Dec 2020 08:00) (20 - 32)  SpO2: 92% (13 Dec 2020 08:00) (83% - 94%)  Daily       PE:  Constitutional: frail looking, on high flow NC  HEENT: NC/AT, EOMI, PERRLA, conjunctivae clear; ears and nose atraumatic; pharynx benign  Neck: supple; thyroid not palpable  Back: no tenderness  Respiratory: decreased breath sounds, crackles   Cardiovascular: S1S2 regular, no murmurs  Abdomen: soft, not tender, not distended, positive BS; liver and spleen WNL  Genitourinary: no suprapubic tenderness  Lymphatic: no LN palpable  Musculoskeletal: no muscle tenderness, no joint swelling or tenderness  Extremities: no pedal edema  Neurological/ Psychiatric:  moving all extremities  Skin: no rashes; no palpable lesions    Labs: all available labs reviewed                        12.   7.99  )-----------( 184      ( 13 Dec 2020 07:44 )             38.7         141  |  106  |  15  ----------------------------<  246<H>  3.5   |  28  |  0.64    Ca    8.9      13 Dec 2020 07:44  Mg     2.2     12-    TPro  6.6  /  Alb  2.6<L>  /  TBili  0.4  /  DBili  x   /  AST  36  /  ALT  28  /  AlkPhos  24<L>  12-     LIVER FUNCTIONS - ( 13 Dec 2020 07:44 )  Alb: 2.6 g/dL / Pro: 6.6 gm/dL / ALK PHOS: 24 U/L / ALT: 28 U/L / AST: 36 U/L / GGT: x           Urinalysis Basic - ( 12 Dec 2020 09:45 )    Color: Yellow / Appearance: very cloudy / S.025 / pH: x  Gluc: x / Ketone: Large  / Bili: Negative / Urobili: 1 mg/dL   Blood: x / Protein: 500 mg/dL / Nitrite: Positive   Leuk Esterase: Small / RBC: >50 /HPF / WBC 26-50   Sq Epi: x / Non Sq Epi: Moderate / Bacteria: Many    Culture - Blood (20 @ 09:21)   - Coagulase negative Staphylococcus: Detec   Gram Stain:   Growth in aerobic bottle: Gram positive cocci in pairs   Specimen Source: .Blood Blood-Peripheral   Organism: Blood Culture PCR   Culture Results:   Growth in aerobic bottle: Gram positive cocci in pairs   "Due to technical problems, Proteus sp. will Not be reported as part of   the BCID panel until further notice"     Radiology: all available radiological tests reviewed  < from: Xray Chest 1 View- PORTABLE-Urgent (20 @ 10:28) >    EXAM:  XR CHEST PORTABLE URGENT 1V                            PROCEDURE DATE:  2020          INTERPRETATION:  AP chest on 2020 at 10:22 AM. Patient has sepsis, cough, and shortness of breath.    COMPARISON: None available.    Heart may be enlarged.    There is a slight infiltrate in the left midlung field and possibly another in the retrocardiac area.    Small infiltrate off right upper hilum is also possible.    IMPRESSION: Small infiltrates as above.        Advanced directives addressed: full resuscitation

## 2020-12-13 NOTE — DIETITIAN INITIAL EVALUATION ADULT. - PERTINENT LABORATORY DATA
12-13    x   |  x   |  x   ----------------------------<  x   x    |  x   |  0.85    Ca    8.9      13 Dec 2020 07:44  Mg     2.2     12-12    TPro  6.9  /  Alb  2.5<L>  /  TBili  0.4  /  DBili  0.1  /  AST  42<H>  /  ALT  29  /  AlkPhos  29<L>  12-13

## 2020-12-13 NOTE — DIETITIAN INITIAL EVALUATION ADULT. - PERTINENT MEDS FT
MEDICATIONS  (STANDING):  amLODIPine   Tablet 10 milliGRAM(s) Oral daily  aspirin  chewable 81 milliGRAM(s) Oral daily  cefTRIAXone Injectable. 1000 milliGRAM(s) IV Push every 24 hours  dexAMETHasone  IVPB 10 milliGRAM(s) IV Intermittent daily  dextrose 40% Gel 15 Gram(s) Oral once  dextrose 5%. 1000 milliLiter(s) (50 mL/Hr) IV Continuous <Continuous>  dextrose 50% Injectable 25 Gram(s) IV Push once  enoxaparin Injectable 40 milliGRAM(s) SubCutaneous every 12 hours  furosemide    Tablet 20 milliGRAM(s) Oral daily  glucagon  Injectable 1 milliGRAM(s) IntraMuscular once  insulin glargine Injectable (LANTUS) 10 Unit(s) SubCutaneous every morning  insulin glargine Injectable (LANTUS) 10 Unit(s) SubCutaneous at bedtime  insulin lispro (ADMELOG) corrective regimen sliding scale   SubCutaneous three times a day before meals  insulin lispro (ADMELOG) corrective regimen sliding scale   SubCutaneous at bedtime  lamoTRIgine 100 milliGRAM(s) Oral two times a day  lisinopril 40 milliGRAM(s) Oral daily  PARoxetine 20 milliGRAM(s) Oral daily  primidone 250 milliGRAM(s) Oral daily  primidone 50 milliGRAM(s) Oral daily  primidone 150 milliGRAM(s) Oral at bedtime  remdesivir  IVPB   IV Intermittent   simvastatin 40 milliGRAM(s) Oral at bedtime    MEDICATIONS  (PRN):  acetaminophen   Tablet .. 650 milliGRAM(s) Oral every 4 hours PRN Temp greater or equal to 38.5C (101.3F)  ALBUTerol    90 MICROgram(s) HFA Inhaler 2 Puff(s) Inhalation every 4 hours PRN Shortness of Breath and/or Wheezing  guaifenesin/dextromethorphan  Syrup 10 milliLiter(s) Oral every 4 hours PRN Cough

## 2020-12-13 NOTE — DIETITIAN INITIAL EVALUATION ADULT. - OTHER INFO
69 yo obese female with pmh niddm, htn, mild cognitive impairment, ?GALE/OHS, presents with complaints of 1 week onset of productive cough, weakness, mylagia, lethargy, diarrhea and shortness of breath. Daughter and  both have covid and live with her. Daughter is currently hospitalized for covid pna. Pt is lethargic but arousable on command. Requiring 4-5 L NC saturating 94%. Tachypneic but not using acceossry muscles. History limited due to condition. Vital stable. CXR c/w bilateral patchy infiltrates. Elevated inflammatory markers. COVID pending however strongly suspicious with these findings  Pt moved to CCU and placed on high flow nasal    Pt seen for poor PO intake 3 days or greater. Pt noted on Non-rebreather prior to admission to CCU and moved and now on High flow nasal. RD discussed with PA about condition and poor PO intake. Pt unable to enter pt's room 2/2 COVID precautions pt  alert and waved at RD to come in (RD informed staff). PA states they are holding food to see how patient progresses (pt at risk of intubation). They are planning on ordering a PO diet this evening if possible. Pt with no weight or diet h/x in chart. weight appears accurate based on RD visual but recommend checking weight to make sure. Pt with no noted h/x of chewing or swallowing difficulty, no noted n/v/c. last BM noted on 12/12 as diarrhea by nursing staff. Recommend at this time to advance diet when medically feasible to Regular diet with Gelatein BID. Pt is with questionable nutrition status, but with limited infomration RD unable to confirm evidence of malnutrition (no wt h/x, no PO h/x, no NFPE). WIll continue to monitor pt. Please reconsult if pt is intubated and TFs are required.

## 2020-12-13 NOTE — PROVIDER CONTACT NOTE (CRITICAL VALUE NOTIFICATION) - SITUATION
Positive blood culture for Jannet Varma. collected on 12/12/20. growth in aerobic bottle. Grew Gram positive Cocci in pairs

## 2020-12-13 NOTE — PROVIDER CONTACT NOTE (MEDICATION) - BACKGROUND
previous bedtime reading 408 and 416. Dr Barkley made aware by charge Nurse. 8 units given by RN as per sliding scale and recheck

## 2020-12-13 NOTE — CONSULT NOTE ADULT - SUBJECTIVE AND OBJECTIVE BOX
Patient is a 70y old  Female who presents with a chief complaint of sob and cough (12 Dec 2020 12:37)      BRIEF HOSPITAL COURSE: ***    Events last 24 hours: ***      PAST MEDICAL & SURGICAL HISTORY:  HLD (hyperlipidemia)    Mild cognitive impairment    DM (diabetes mellitus)    HTN (hypertension)      Allergies    No Known Allergies    Intolerances      FAMILY HISTORY:      Review of Systems:  CONSTITUTIONAL: No fever, chills, or fatigue  EYES: No eye pain, visual disturbances, or discharge  ENMT:  No difficulty hearing, tinnitus, vertigo; No sinus or throat pain  NECK: No pain or stiffness  RESPIRATORY: No cough, wheezing, chills or hemoptysis; No shortness of breath  CARDIOVASCULAR: No chest pain, palpitations, dizziness, or leg swelling  GASTROINTESTINAL: No abdominal or epigastric pain. No nausea, vomiting, or hematemesis; No diarrhea or constipation. No melena or hematochezia.  GENITOURINARY: No dysuria, frequency, hematuria, or incontinence  NEUROLOGICAL: No headaches, memory loss, loss of strength, numbness, or tremors  SKIN: No itching, burning, rashes, or lesions   MUSCULOSKELETAL: No joint pain or swelling; No muscle, back, or extremity pain  PSYCHIATRIC: No depression, anxiety, mood swings, or difficulty sleeping      Social History: ***      Medications:  cefTRIAXone Injectable. 1000 milliGRAM(s) IV Push every 24 hours    amLODIPine   Tablet 10 milliGRAM(s) Oral daily  furosemide    Tablet 20 milliGRAM(s) Oral daily  lisinopril 40 milliGRAM(s) Oral daily    ALBUTerol    90 MICROgram(s) HFA Inhaler 2 Puff(s) Inhalation every 4 hours PRN  guaifenesin/dextromethorphan  Syrup 10 milliLiter(s) Oral every 4 hours PRN    acetaminophen   Tablet .. 650 milliGRAM(s) Oral every 4 hours PRN  lamoTRIgine 100 milliGRAM(s) Oral two times a day  PARoxetine 20 milliGRAM(s) Oral daily  primidone 250 milliGRAM(s) Oral daily  primidone 50 milliGRAM(s) Oral daily  primidone 150 milliGRAM(s) Oral at bedtime      aspirin  chewable 81 milliGRAM(s) Oral daily  enoxaparin Injectable 40 milliGRAM(s) SubCutaneous every 12 hours        dexAMETHasone  Injectable 6 milliGRAM(s) IV Push daily  dextrose 40% Gel 15 Gram(s) Oral once  dextrose 50% Injectable 25 Gram(s) IV Push once  glucagon  Injectable 1 milliGRAM(s) IntraMuscular once  insulin lispro (ADMELOG) corrective regimen sliding scale   SubCutaneous three times a day before meals  insulin lispro (ADMELOG) corrective regimen sliding scale   SubCutaneous at bedtime  simvastatin 40 milliGRAM(s) Oral at bedtime    dextrose 5%. 1000 milliLiter(s) IV Continuous <Continuous>                ICU Vital Signs Last 24 Hrs  T(C): 36.8 (13 Dec 2020 01:), Max: 38.8 (12 Dec 2020 09:48)  T(F): 98.3 (13 Dec 2020 01:13), Max: 101.9 (12 Dec 2020 09:48)  HR: 79 (13 Dec 2020 01:13) (58 - 99)  BP: 144/79 (12 Dec 2020 21:00) (128/53 - 158/71)  BP(mean): 78 (12 Dec 2020 09:48) (78 - 93)  ABP: --  ABP(mean): --  RR: 22 (13 Dec 2020 03:37) (20 - 30)  SpO2: 94% (13 Dec 2020 03:37) (78% - 95%)    Vital Signs Last 24 Hrs  T(C): 36.8 (13 Dec 2020 01:), Max: 38.8 (12 Dec 2020 09:48)  T(F): 98.3 (13 Dec 2020 01:13), Max: 101.9 (12 Dec 2020 09:48)  HR: 79 (13 Dec 2020 01:) (58 - 99)  BP: 144/79 (12 Dec 2020 21:00) (128/53 - 158/71)  BP(mean): 78 (12 Dec 2020 09:48) (78 - 93)  RR: 22 (13 Dec 2020 03:37) (20 - 30)  SpO2: 94% (13 Dec 2020 03:37) (78% - 95%)        I&O's Detail        LABS:                        13.1   6.49  )-----------( 173      ( 12 Dec 2020 09:21 )             40.3     12    133<L>  |  96  |  15  ----------------------------<  263<H>  3.0<L>   |  29  |  0.89    Ca    8.7      12 Dec 2020 09:21  Mg     2.2     12-12    TPro  7.9  /  Alb  3.1<L>  /  TBili  0.6  /  DBili  x   /  AST  39<H>  /  ALT  34  /  AlkPhos  33<L>  12-12      CARDIAC MARKERS ( 12 Dec 2020 13:43 )  0.045 ng/mL / x     / x     / x     / x          CAPILLARY BLOOD GLUCOSE      POCT Blood Glucose.: 191 mg/dL (13 Dec 2020 06:03)    PT/INR - ( 12 Dec 2020 09:21 )   PT: 14.5 sec;   INR: 1.25 ratio         PTT - ( 12 Dec 2020 09:21 )  PTT:31.1 sec  Urinalysis Basic - ( 12 Dec 2020 09:45 )    Color: Yellow / Appearance: very cloudy / S.025 / pH: x  Gluc: x / Ketone: Large  / Bili: Negative / Urobili: 1 mg/dL   Blood: x / Protein: 500 mg/dL / Nitrite: Positive   Leuk Esterase: Small / RBC: >50 /HPF / WBC 26-50   Sq Epi: x / Non Sq Epi: Moderate / Bacteria: Many      CULTURES:  Culture Results:   Growth in aerobic bottle: Gram positive cocci in pairs  "Due to technical problems, Proteus sp. will Not be reported as part of  the BCID panel until further notice"  ***Blood Panel PCR results on this specimen are available  approximately 3 hours after the Gram stain result.***  Gram stain, PCR, and/or culture results may not always  correspond due to difference in methodologies.  ************************************************************  This PCR assay was performed using [employer].  The following targets are tested for: Enterococcus,  vancomycin resistant enterococci, Listeria monocytogenes,  coagulase negative staphylococci, S. aureus,  methicillin resistant S. aureus, Streptococcus agalactiae  (Group B), S. pneumoniae, S. pyogenes (Group A),  Acinetobacter baumannii, Enterobacter cloacae, E. coli,  Klebsiella oxytoca, K. pneumoniae, Proteus sp.,  Serratia marcescens, Haemophilus influenzae,  Neisseria meningitidis, Pseudomonas aeruginosa, Candida  albicans, C. glabrata, C krusei, C parapsilosis,  C. tropicalis and the KPC resistance gene. (12 @ 09:21)        Physical Examination:    General: No acute distress.  Alert, oriented, interactive, nonfocal    HEENT: Pupils equal, reactive to light.  Symmetric.    PULM: Clear to auscultation bilaterally, no significant sputum production    CVS: Regular rate and rhythm, no murmurs, rubs, or gallops    ABD: Soft, nondistended, nontender, normoactive bowel sounds, no masses    EXT: No edema, nontender    SKIN: Warm and well perfused, no rashes noted.    NEURO: A&Ox3, strength 5/5 all extremities, cranial nerves grossly intact, no focal deficits      RADIOLOGY: ***        CRITICAL CARE TIME SPENT: ***  Time spent evaluating/treating patient with medical issues that pose a high risk for life threatening deterioration and/or end-organ damage, reviewing data/labs/imaging, discussing case with multidisciplinary team, discussing plan/goals of care with patient/family. Non-inclusive of procedure time.   Patient is a 70y old  Female who presents with a chief complaint of sob and cough (13 Dec 2020 06:26)      BRIEF HOSPITAL COURSE: 71 y/o F with a h/o DM, HTN, HLD, mild cognitive impairment, ?GALE/OHS, admitted on  with complaints of 1 week onset of productive cough, weakness, myalgias, lethargy, diarrhea, and shortness of breath. Daughter and  both have covid-19 and live with her. Daughter is currently hospitalized for covid-19 pna. She is COVID-19(+) with bilateral patchy infiltrates on CXR. Coag neg staph bacteremia.    Events last 24 hours: Responded to RRT for respiratory distress and hypoxemia. Patient laying on her side with NRB mask on @ 100% FiO2 with SpO2 in the mid-70s. Fully pronated with improvement in SpO2 to 80%. She is tachypneic but without significant work of breathing. Transfer to ICU for HiFlo nasal oxygen with high risk for endotracheal intubation/mechanical ventilation.        PAST MEDICAL & SURGICAL HISTORY:  HLD (hyperlipidemia)    Mild cognitive impairment    DM (diabetes mellitus)    HTN (hypertension)        Review of Systems:  Unable to obtain secondary to sedation/intubation.    CONSTITUTIONAL: No fever, chills, or fatigue  EYES: No eye pain, visual disturbances, or discharge  ENMT:  No difficulty hearing, tinnitus, vertigo; No sinus or throat pain  NECK: No pain or stiffness  RESPIRATORY: No cough, wheezing, chills or hemoptysis; (+) shortness of breath  CARDIOVASCULAR: No chest pain, palpitations, dizziness, or leg swelling  GASTROINTESTINAL: No abdominal or epigastric pain. No nausea, vomiting, or hematemesis; No diarrhea or constipation. No melena or hematochezia.  GENITOURINARY: No dysuria, frequency, hematuria, or incontinence  NEUROLOGICAL: No headaches, memory loss, loss of strength, numbness, or tremors  SKIN: No itching, burning, rashes, or lesions   MUSCULOSKELETAL: No joint pain or swelling; No muscle, back, or extremity pain  PSYCHIATRIC: No depression, anxiety, mood swings, or difficulty sleeping      Medications:  cefTRIAXone Injectable. 1000 milliGRAM(s) IV Push every 24 hours  amLODIPine   Tablet 10 milliGRAM(s) Oral daily  furosemide    Tablet 20 milliGRAM(s) Oral daily  lisinopril 40 milliGRAM(s) Oral daily  ALBUTerol    90 MICROgram(s) HFA Inhaler 2 Puff(s) Inhalation every 4 hours PRN  guaifenesin/dextromethorphan  Syrup 10 milliLiter(s) Oral every 4 hours PRN  acetaminophen   Tablet .. 650 milliGRAM(s) Oral every 4 hours PRN  lamoTRIgine 100 milliGRAM(s) Oral two times a day  PARoxetine 20 milliGRAM(s) Oral daily  primidone 250 milliGRAM(s) Oral daily  primidone 50 milliGRAM(s) Oral daily  primidone 150 milliGRAM(s) Oral at bedtime  aspirin  chewable 81 milliGRAM(s) Oral daily  enoxaparin Injectable 40 milliGRAM(s) SubCutaneous every 12 hours  dexAMETHasone  Injectable 6 milliGRAM(s) IV Push daily  dextrose 40% Gel 15 Gram(s) Oral once  dextrose 50% Injectable 25 Gram(s) IV Push once  glucagon  Injectable 1 milliGRAM(s) IntraMuscular once  insulin lispro (ADMELOG) corrective regimen sliding scale   SubCutaneous three times a day before meals  insulin lispro (ADMELOG) corrective regimen sliding scale   SubCutaneous at bedtime  simvastatin 40 milliGRAM(s) Oral at bedtime  dextrose 5%. 1000 milliLiter(s) IV Continuous <Continuous>          ICU Vital Signs Last 24 Hrs  T(C): 36.8 (13 Dec 2020 01:13), Max: 38.8 (12 Dec 2020 09:48)  T(F): 98.3 (13 Dec 2020 01:13), Max: 101.9 (12 Dec 2020 09:48)  HR: 79 (13 Dec 2020 01:13) (58 - 99)  BP: 144/79 (12 Dec 2020 21:00) (128/53 - 158/71)  BP(mean): 78 (12 Dec 2020 09:48) (78 - 93)  ABP: --  ABP(mean): --  RR: 22 (13 Dec 2020 03:37) (20 - 30)  SpO2: 94% (13 Dec 2020 03:37) (78% - 95%)          I&O's Detail        LABS:                        13.1   6.49  )-----------( 173      ( 12 Dec 2020 09:21 )             40.3     12-12    133<L>  |  96  |  15  ----------------------------<  263<H>  3.0<L>   |  29  |  0.89    Ca    8.7      12 Dec 2020 09:21  Mg     2.2         TPro  7.9  /  Alb  3.1<L>  /  TBili  0.6  /  DBili  x   /  AST  39<H>  /  ALT  34  /  AlkPhos  33<L>  1212      CARDIAC MARKERS ( 12 Dec 2020 13:43 )  0.045 ng/mL / x     / x     / x     / x          CAPILLARY BLOOD GLUCOSE      POCT Blood Glucose.: 191 mg/dL (13 Dec 2020 06:03)    PT/INR - ( 12 Dec 2020 09:21 )   PT: 14.5 sec;   INR: 1.25 ratio         PTT - ( 12 Dec 2020 09:21 )  PTT:31.1 sec  Urinalysis Basic - ( 12 Dec 2020 09:45 )    Color: Yellow / Appearance: very cloudy / S.025 / pH: x  Gluc: x / Ketone: Large  / Bili: Negative / Urobili: 1 mg/dL   Blood: x / Protein: 500 mg/dL / Nitrite: Positive   Leuk Esterase: Small / RBC: >50 /HPF / WBC 26-50   Sq Epi: x / Non Sq Epi: Moderate / Bacteria: Many      CULTURES:  Culture Results:   Growth in aerobic bottle: Gram positive cocci in pairs  "Due to technical problems, Proteus sp. will Not be reported as part of  the BCID panel until further notice"  ***Blood Panel PCR results on this specimen are available  approximately 3 hours after the Gram stain result.***  Gram stain, PCR, and/or culture results may not always  correspond due to difference in methodologies.  ************************************************************  This PCR assay was performed using [employer].  The following targets are tested for: Enterococcus,  vancomycin resistant enterococci, Listeria monocytogenes,  coagulase negative staphylococci, S. aureus,  methicillin resistant S. aureus, Streptococcus agalactiae  (Group B), S. pneumoniae, S. pyogenes (Group A),  Acinetobacter baumannii, Enterobacter cloacae, E. coli,  Klebsiella oxytoca, K. pneumoniae, Proteus sp.,  Serratia marcescens, Haemophilus influenzae,  Neisseria meningitidis, Pseudomonas aeruginosa, Candida  albicans, C. glabrata, C krusei, C parapsilosis,  C. tropicalis and the KPC resistance gene. (20 @ 09:21)        Physical Examination:    General: on HFNC, prone, obese    HEENT: Pupils equal, reactive to light.  Symmetric.    PULM: diminished bilaterally, no significant sputum production    CVS: Regular rate and rhythm, no murmurs, rubs, or gallops    ABD: Soft, nondistended, nontender, normoactive bowel sounds, no masses    EXT: No edema, nontender    SKIN: Warm and well perfused, no rashes noted.    NEURO: A&Ox3, following commands, moves all extremities with 5/5 strength      RADIOLOGY:     < from: Xray Chest 1 View- PORTABLE-Urgent (20 @ 10:28) >  Heart may be enlarged.    There is a slight infiltrate in the left midlung field and possibly another in the retrocardiac area.    Small infiltrate off right upper hilum is also possible.    IMPRESSION: Small infiltrates as above.          A/P:    71 y/o F with a h/o DM, HTN, HLD, mild cognitive impairment, ?GALE/OHS, with acute hypoxemic respiratory failure, ARDS, COVID-19 viral pneumonia, UTI, coag neg staph bacteremia, hyperglycemia.      - transitioned from NRB mask to HiFlo nasal oxygen with 100% FiO2 and 60LPM flow rate, pronated, goal SpO2 > 88%, she is at a very high risk for further life threatening respiratory deterioration requiring endotracheal intubation and mechanical ventilation, although we will only pursue this as an absolute last resort given the associated high mortality rate    - periodically evaluating volume status to assess need for IV diuresis, goal euvolemia - net neg fluid balance, give 40mg IV furosemide now    - increase IV dexamethasone to 10mg QD in the setting of ARDS    - plan for remdesivir    - coag neg staph bacteremia of unknown source, contaminant? Received vancomycin x 1 dose, ID has been consulted as per medicine team    - UA(+), urine culture pending, continue ceftriaxone    - uncontrolled BG, expect to continue to worsen with high dose IV steroids, add 10u Lantus to insulin regimen    - continue enoxaparin 40mg BID based on BMI, d-dimer only 300, no indication for systemic a/c      Contacted patient's , Guevara (cell: [phone]), and updated him on this morning's unfortunate events. All questions answered and concerns addressed.       Case discussed in detail with ICU physician, Dr. See.      CRITICAL CARE TIME SPENT: 54 mins  Time spent evaluating/treating patient with medical issues that pose a high risk for life threatening deterioration and/or end-organ damage, reviewing data/labs/imaging, discussing case with multidisciplinary team, discussing plan/goals of care with patient/family. Non-inclusive of procedure time.

## 2020-12-13 NOTE — PROGRESS NOTE ADULT - ASSESSMENT
71 yo f pmhx DM, HTN, HLD, mild cognitive impairment, GALE/OHS admitted COVID 19, hypoxic respiratory failure.      NEURO: Continue lamictal   CV: HTN on amlodipine and lisinopril. HLD on statin therapy   RESP: Hypoxic respiratory failure, failed HFNC therapy, placed on BiPAP for support low dose morphine prn for respiratory distress, patient appears to be slowly declining despite all best efforts, family aware  RENAL: diuresing nicely, monitor electrolytes, replace lytes as needed  GI: Carb consistent diet as tolerated   ENDO: Hyperglycemia, increased lantus, ISS   ID: COVID 19 on remdesivir and decadron, rocephin for empiric coverage   HEME: Lovenox for VTE ppx   DISPO: Full code. Patient  and daughter called and updated.      additional Critical Care time: 60 mins assessing presenting problems of acute illness that poses high probability of life threatening deterioration or end organ damage/dysfunction.  Medical decision making including Initiating plan of care, reviewing data, reviewing radiology, discussing with multidisciplinary team, non inclusive of procedures, discussing goals of care with patient/family

## 2020-12-13 NOTE — PROGRESS NOTE ADULT - SUBJECTIVE AND OBJECTIVE BOX
Patient is a 70y old  Female who presents with a chief complaint of sob and cough (13 Dec 2020 10:48)    BRIEF HOSPITAL COURSE:   71 yo f pmhx DM, HTN, HLD, mild cognitive impairment, GALE/OHS admitted COVID 19, hypoxic respiratory failure.      Events last 24 hours:   Transferred to ccu this am due to desatting on NRB, patient placed on HFNC and was self proning.  Patient no longer able to tolerate pronation, desatting and maintaining in low 80s.  Patient placed on bipap.        PAST MEDICAL & SURGICAL HISTORY:  HLD (hyperlipidemia)  Mild cognitive impairment  DM (diabetes mellitus)  HTN (hypertension)      Allergies  No Known Allergies      FAMILY HISTORY:  Unknown       Social History:   From home      Review of Systems:  +sob    Physical Examination:    General: Elderly pleasant, female, lying in bed     HEENT: NC/AT, Pupils equal, reactive to light.  Symmetric. HFNC in place    PULM: Symmetrical thorax expansion upon respiration.  Coarse to auscultation bilaterally, no significant sputum production    CVS: Regular rate and rhythm, no murmurs, rubs, or gallops appreciated    ABD: Soft, nondistended, nontender, normoactive bowel sounds, no masses appreciated    EXT: No edema, nontender    SKIN: Warm and well perfused, no rashes noted.    NEURO: Alert, oriented, interactive, nonfocal      Medications:  cefTRIAXone Injectable. 1000 milliGRAM(s) IV Push every 24 hours  remdesivir  IVPB   IV Intermittent   amLODIPine   Tablet 10 milliGRAM(s) Oral daily  furosemide    Tablet 20 milliGRAM(s) Oral daily  lisinopril 40 milliGRAM(s) Oral daily  ALBUTerol    90 MICROgram(s) HFA Inhaler 2 Puff(s) Inhalation every 4 hours PRN  guaifenesin/dextromethorphan  Syrup 10 milliLiter(s) Oral every 4 hours PRN  acetaminophen   Tablet .. 650 milliGRAM(s) Oral every 4 hours PRN  lamoTRIgine 100 milliGRAM(s) Oral two times a day  PARoxetine 20 milliGRAM(s) Oral daily  primidone 250 milliGRAM(s) Oral daily  primidone 50 milliGRAM(s) Oral daily  primidone 150 milliGRAM(s) Oral at bedtime  aspirin  chewable 81 milliGRAM(s) Oral daily  enoxaparin Injectable 40 milliGRAM(s) SubCutaneous every 12 hours  dexAMETHasone  IVPB 10 milliGRAM(s) IV Intermittent daily  dextrose 40% Gel 15 Gram(s) Oral once  dextrose 50% Injectable 25 Gram(s) IV Push once  glucagon  Injectable 1 milliGRAM(s) IntraMuscular once  insulin glargine Injectable (LANTUS) 10 Unit(s) SubCutaneous every morning  insulin glargine Injectable (LANTUS) 10 Unit(s) SubCutaneous at bedtime  insulin lispro (ADMELOG) corrective regimen sliding scale   SubCutaneous three times a day before meals  insulin lispro (ADMELOG) corrective regimen sliding scale   SubCutaneous at bedtime  simvastatin 40 milliGRAM(s) Oral at bedtime  dextrose 5%. 1000 milliLiter(s) IV Continuous <Continuous>      ICU Vital Signs Last 24 Hrs  T(C): 39.1 (13 Dec 2020 10:46), Max: 39.6 (13 Dec 2020 06:30)  T(F): 102.4 (13 Dec 2020 10:46), Max: 103.3 (13 Dec 2020 06:30)  HR: 82 (13 Dec 2020 12:00) (58 - 121)  BP: 91/43 (13 Dec 2020 12:00) (91/43 - 156/97)  BP(mean): 54 (13 Dec 2020 12:00) (54 - 111)  ABP: --  ABP(mean): --  RR: 26 (13 Dec 2020 12:00) (20 - 32)  SpO2: 88% (13 Dec 2020 12:00) (83% - 94%)    Vital Signs Last 24 Hrs  T(C): 39.1 (13 Dec 2020 10:46), Max: 39.6 (13 Dec 2020 06:30)  T(F): 102.4 (13 Dec 2020 10:46), Max: 103.3 (13 Dec 2020 06:30)  HR: 82 (13 Dec 2020 12:00) (58 - 121)  BP: 91/43 (13 Dec 2020 12:00) (91/43 - 156/97)  BP(mean): 54 (13 Dec 2020 12:00) (54 - 111)  RR: 26 (13 Dec 2020 12:00) (20 - 32)  SpO2: 88% (13 Dec 2020 12:00) (83% - 94%)      I&O's Detail    13 Dec 2020 07:01  -  13 Dec 2020 12:25  --------------------------------------------------------  IN:  Total IN: 0 mL    OUT:    Ureteral Catheter (mL): 800 mL  Total OUT: 800 mL  Total NET: -800 mL      LABS:                        12.6   7.99  )-----------( 184      ( 13 Dec 2020 07:44 )             38.7         141  |  106  |  15  ----------------------------<  246<H>  3.5   |  28  |  0.64    Ca    8.9      13 Dec 2020 07:44  Mg     2.2         TPro  6.6  /  Alb  2.6<L>  /  TBili  0.4  /  DBili  x   /  AST  36  /  ALT  28  /  AlkPhos  24<L>        CARDIAC MARKERS ( 12 Dec 2020 13:43 )  0.045 ng/mL / x     / x     / x     / x          CAPILLARY BLOOD GLUCOSE  POCT Blood Glucose.: 272 mg/dL (13 Dec 2020 12:05)      PT/INR - ( 12 Dec 2020 09:21 )   PT: 14.5 sec;   INR: 1.25 ratio    PTT - ( 12 Dec 2020 09:21 )  PTT:31.1 sec      Urinalysis Basic - ( 12 Dec 2020 09:45 )  Color: Yellow / Appearance: very cloudy / S.025 / pH: x  Gluc: x / Ketone: Large  / Bili: Negative / Urobili: 1 mg/dL   Blood: x / Protein: 500 mg/dL / Nitrite: Positive   Leuk Esterase: Small / RBC: >50 /HPF / WBC 26-50   Sq Epi: x / Non Sq Epi: Moderate / Bacteria: Many      CULTURES:  Culture Results:   Growth in aerobic bottle: Gram positive cocci in pairs  "Due to technical problems, Proteus sp. will Not be reported as part of  the BCID panel until further notice"  ***Blood Panel PCR results on this specimen are available  approximately 3 hours after the Gram stain result.***  Gram stain, PCR, and/or culture results may not always  correspond due to difference in methodologies.  ************************************************************  This PCR assay was performed using [employer].  The following targets are tested for: Enterococcus,  vancomycin resistant enterococci, Listeria monocytogenes,  coagulase negative staphylococci, S. aureus,  methicillin resistant S. aureus, Streptococcus agalactiae  (Group B), S. pneumoniae, S. pyogenes (Group A),  Acinetobacter baumannii, Enterobacter cloacae, E. coli,  Klebsiella oxytoca, K. pneumoniae, Proteus sp.,  Serratia marcescens, Haemophilus influenzae,  Neisseria meningitidis, Pseudomonas aeruginosa, Candida  albicans, C. glabrata, C krusei, C parapsilosis,  C. tropicalis and the KPC resistance gene. ( @ 09:21)      RADIOLOGY:   < from: Xray Chest 1 View- PORTABLE-Urgent (20 @ 10:28) >  EXAM:  XR CHEST PORTABLE URGENT 1V                          PROCEDURE DATE:  2020      INTERPRETATION:  AP chest on 2020 at 10:22 AM. Patient has sepsis, cough, and shortness of breath.    COMPARISON: None available.    Heart may be enlarged.    There is a slight infiltrate in the left midlung field and possibly another in the retrocardiac area.    Small infiltrate off right upper hilum is also possible.    IMPRESSION: Small infiltrates as above.    MARCIA RIOS MD; Attending Radiologist  This document has been electronically signed. Dec 12 2020 11:55AM    < end of copied text >      SUPPLEMENTAL O2: NC  LINES: Peripheral   IVF: N  BUCKLEY: Y  PPx: PPI, Lovenox  CONTACT: Y, COVID19

## 2020-12-13 NOTE — PROVIDER CONTACT NOTE (MEDICATION) - BACKGROUND
night time dose over 400. provider made aware  8 units as per bedtime scale given.  recheck blood glucose 316. 4 units given

## 2020-12-14 NOTE — PROGRESS NOTE ADULT - SUBJECTIVE AND OBJECTIVE BOX
Date of service: 20 @ 12:39    pt seen and examined  hypoxic to 80s, on NRB and high flow O2  noted with high fevers up to 102.8  sitting up in bed    ROS: + fever, chills, denies dizziness, no HA, + cough, + sob, no abdominal pain, no diarrhea or constipation; no dysuria, no urinary frequency, no legs pain, no rashes    MEDICATIONS  (STANDING):  amLODIPine   Tablet 10 milliGRAM(s) Oral daily  aspirin  chewable 81 milliGRAM(s) Oral daily  cefTRIAXone Injectable. 1000 milliGRAM(s) IV Push every 24 hours  dexAMETHasone  Injectable 6 milliGRAM(s) IV Push daily  dextrose 40% Gel 15 Gram(s) Oral once  dextrose 5%. 1000 milliLiter(s) (50 mL/Hr) IV Continuous <Continuous>  dextrose 50% Injectable 25 Gram(s) IV Push once  enoxaparin Injectable 40 milliGRAM(s) SubCutaneous every 12 hours  furosemide    Tablet 20 milliGRAM(s) Oral daily  glucagon  Injectable 1 milliGRAM(s) IntraMuscular once  insulin glargine Injectable (LANTUS) 18 Unit(s) SubCutaneous two times a day  insulin lispro (ADMELOG) corrective regimen sliding scale   SubCutaneous three times a day before meals  insulin lispro (ADMELOG) corrective regimen sliding scale   SubCutaneous at bedtime  lamoTRIgine 100 milliGRAM(s) Oral two times a day  lisinopril 40 milliGRAM(s) Oral daily  PARoxetine 20 milliGRAM(s) Oral daily  primidone 250 milliGRAM(s) Oral daily  primidone 50 milliGRAM(s) Oral daily  primidone 150 milliGRAM(s) Oral at bedtime  remdesivir  IVPB   IV Intermittent   remdesivir  IVPB 100 milliGRAM(s) IV Intermittent every 24 hours  simvastatin 40 milliGRAM(s) Oral at bedtime    Vital Signs Last 24 Hrs  T(C): 37.4 (14 Dec 2020 12:00), Max: 39.4 (13 Dec 2020 22:15)  T(F): 99.3 (14 Dec 2020 12:00), Max: 102.9 (13 Dec 2020 22:15)  HR: 80 (14 Dec 2020 12:00) (79 - 97)  BP: 99/49 (14 Dec 2020 12:00) (85/46 - 144/64)  BP(mean): 60 (14 Dec 2020 12:00) (55 - 107)  RR: 28 (14 Dec 2020 12:00) (20 - 39)  SpO2: 83% (14 Dec 2020 12:00) (70% - 94%)          PE:  Constitutional: frail looking, on high flow NC/NRB  HEENT: NC/AT, EOMI, PERRLA, conjunctivae clear; ears and nose atraumatic; pharynx benign  Neck: supple; thyroid not palpable  Back: no tenderness  Respiratory: decreased breath sounds, crackles   Cardiovascular: S1S2 regular, no murmurs  Abdomen: soft, not tender, not distended, positive BS; liver and spleen WNL  Genitourinary: no suprapubic tenderness  Lymphatic: no LN palpable  Musculoskeletal: no muscle tenderness, no joint swelling or tenderness  Extremities: no pedal edema  Neurological/ Psychiatric:  moving all extremities  Skin: no rashes; no palpable lesions    Labs: all available labs reviewed                                   14.7   8.38  )-----------( 199      ( 14 Dec 2020 06:22 )             46.1     12-    139  |  103  |  20  ----------------------------<  251<H>  3.6   |  27  |  0.77    Ca    9.0      14 Dec 2020 06:22  Phos  3.6     12-14  Mg     2.3         TPro  6.9  /  Alb  2.5<L>  /  TBili  0.4  /  DBili  0.1  /  AST  42<H>  /  ALT  29  /  AlkPhos  29<L>  12-13         Urinalysis Basic - ( 12 Dec 2020 09:45 )    Color: Yellow / Appearance: very cloudy / S.025 / pH: x  Gluc: x / Ketone: Large  / Bili: Negative / Urobili: 1 mg/dL   Blood: x / Protein: 500 mg/dL / Nitrite: Positive   Leuk Esterase: Small / RBC: >50 /HPF / WBC 26-50   Sq Epi: x / Non Sq Epi: Moderate / Bacteria: Many    Culture - Urine (20 @ 09:45)   Specimen Source: .Urine Clean Catch (Midstream)   Culture Results:   >100,000 CFU/ml Escherichia coli   Culture - Blood (20 @ 09:21)   - Coagulase negative Staphylococcus: Detec   Gram Stain:   Growth in aerobic bottle: Gram positive cocci in pairs   Specimen Source: .Blood Blood-Peripheral   Organism: Blood Culture PCR   Culture Results:   Growth in aerobic bottle: Gram positive cocci in pairs   "Due to technical problems, Proteus sp. will Not be reported as part of   the BCID panel until further notice"     Radiology: all available radiological tests reviewed  < from: Xray Chest 1 View- PORTABLE-Urgent (20 @ 10:28) >    EXAM:  XR CHEST PORTABLE URGENT 1V                            PROCEDURE DATE:  2020          INTERPRETATION:  AP chest on 2020 at 10:22 AM. Patient has sepsis, cough, and shortness of breath.    COMPARISON: None available.    Heart may be enlarged.    There is a slight infiltrate in the left midlung field and possibly another in the retrocardiac area.    Small infiltrate off right upper hilum is also possible.    IMPRESSION: Small infiltrates as above.        Advanced directives addressed: full resuscitation

## 2020-12-14 NOTE — PROGRESS NOTE ADULT - ASSESSMENT
IMP:    69 y/o female pt of size with HTN, DM, HL, cognitive impairment and GALE/OHS admitted with viral PNA secondary to COVID  Acute type 1 resp failure    High risk for acute decompensation and deterioration including death     Plan:    Trial off BIPAP to HFNC so she can keep up with PO nutrition   Cont with Rem/Deca (2)  Follow O2 sat--does not need intubation at this time but is at high risk for intubation  Increase lantus to 18 bid  FS with insulin coverage--keep FS < 180  HOB > 30  DC phyllis  DVT prophy--SCD and LMWH bid    CCU care-- d/w ICU staff on multi disciplinary rounds and pt-- All concerns addressed including but not limited to diagnosis, treatment plan and overall prognosis IMP:    71 y/o female pt of size with HTN, DM, HL, cognitive impairment and GALE/OHS admitted with viral PNA secondary to COVID  Acute type 1 resp failure    High risk for acute decompensation and deterioration including death     Plan:    Trial off BIPAP to HFNC so she can keep up with PO nutrition   Cont with Rem/Deca (2)  Cont with CTX (3) total of 5 days  Follow O2 sat--does not need intubation at this time but is at high risk for intubation  Increase lantus to 18 bid  FS with insulin coverage--keep FS < 180  HOB > 30  DC ferguson  DVT prophy--SCD and LMWH bid    CCU care-- d/w ICU staff on multi disciplinary rounds and pt-- All concerns addressed including but not limited to diagnosis, treatment plan and overall prognosis IMP:    71 y/o female pt of size with HTN, DM, HL, cognitive impairment and GALE/OHS admitted with viral PNA secondary to COVID  Acute type 1 resp failure  E. coli UTI on CTX    High risk for acute decompensation and deterioration including death     Plan:    Trial off BIPAP to HFNC so she can keep up with PO nutrition   Cont with Rem/Deca (2)  Cont with CTX (3) total of 5 days  Follow O2 sat--does not need intubation at this time but is at high risk for intubation  Increase lantus to 18 bid  FS with insulin coverage--keep FS < 180  HOB > 30  DC ferguson  DVT prophy--SCD and LMWH bid    CCU care-- d/w ICU staff on multi disciplinary rounds and pt-- All concerns addressed including but not limited to diagnosis, treatment plan and overall prognosis

## 2020-12-14 NOTE — PROGRESS NOTE ADULT - ASSESSMENT
69 yo obese female with pmh niddm, htn, mild cognitive impairment, ?GALE/OHS, presents with complaints of 1 week onset of productive cough, weakness, mylagia, lethargy, diarrhea and shortness of breath. Daughter and  both have covid and live with her. Daughter is currently hospitalized for covid pna. Pt is lethargic but arousable on command. Requiring 4-5 L NC saturating 94%. Tachypneic but not using accessory muscles. History limited due to condition. Vital stable. CXR c/w bilateral patchy infiltrates. Elevated inflammatory markers. Here UA positive, concern for UTI, covid-19 pcr positive, was given IV cefepime/decadron/remdesivir, tx to CCU, hypoxic on NC, now on high flow o2.    1. acute respiratory failure. covid-19 viral syndrome. multifocal pneumonia. UTI  - on IV rocephin 1gm daily #2 s/p cefepime #1 for UTI  - urine cx growing Ecoli, f/u sensitivities,  blood cx staph hominis CONS - c/w contaminant  - on remdesivir #3 monitor renal/hepatic function closely on therapy  - on decadron 6mg daily #3  - continue with antiviral, steroids/abx  - 5 day course abx  - isolation precautions  - fu cbc  - supportive care  - tolerating abx well so far; no side effects noted  - reason for abx use and side effects reviewed with patient  - supportive care  - high risk for intubation    2. other issues - care per medicine  69 yo obese female with pmh niddm, htn, mild cognitive impairment, ?GALE/OHS, presents with complaints of 1 week onset of productive cough, weakness, mylagia, lethargy, diarrhea and shortness of breath. Daughter and  both have covid and live with her. Daughter is currently hospitalized for covid pna. Pt is lethargic but arousable on command. Requiring 4-5 L NC saturating 94%. Tachypneic but not using accessory muscles. History limited due to condition. Vital stable. CXR c/w bilateral patchy infiltrates. Elevated inflammatory markers. Here UA positive, concern for UTI, covid-19 pcr positive, was given IV cefepime/decadron/remdesivir, tx to CCU, hypoxic on NC, now on high flow o2.    1. acute respiratory failure. covid-19 viral syndrome. multifocal pneumonia. UTI  - on IV rocephin 1gm daily #2 s/p cefepime #1 for UTI  - urine cx growing Ecoli, f/u sensitivities,  blood cx staph hominis CONS - c/w contaminant  - on remdesivir #2 monitor renal/hepatic function closely on therapy  - on decadron 6mg daily #3  - continue with antiviral, steroids/abx  - 5 day course abx  - isolation precautions  - fu cbc  - supportive care  - tolerating abx well so far; no side effects noted  - reason for abx use and side effects reviewed with patient  - supportive care  - high risk for intubation    2. other issues - care per medicine

## 2020-12-14 NOTE — PROGRESS NOTE ADULT - SUBJECTIVE AND OBJECTIVE BOX
[facility]  CCU # 1    CC:  COVID    HPI:    71 y/o female pt of size with HTN, DM, HL, cognitive impairment and GALE/OHS presents with complaints of 1 week onset of productive cough, weakness, myalgia, lethargy, diarrhea and shortness of breath. Daughter and  both have covid and live with her. Daughter is currently hospitalized for covid pna    :  Pt seen and examined in CCU--on BIPAP--O2 sat high 80s--Awake--increased WOB--able to speak in full sentences.  FS not well controlled.      PMH:  As above.     PSH:  As above.     FH: Non Contributory other than those listed in HPI    Social History:  NC    MEDICATIONS  (STANDING):  amLODIPine   Tablet 10 milliGRAM(s) Oral daily  aspirin  chewable 81 milliGRAM(s) Oral daily  cefTRIAXone Injectable. 1000 milliGRAM(s) IV Push every 24 hours  dexAMETHasone  IVPB 10 milliGRAM(s) IV Intermittent daily  dextrose 40% Gel 15 Gram(s) Oral once  dextrose 5%. 1000 milliLiter(s) (50 mL/Hr) IV Continuous <Continuous>  dextrose 50% Injectable 25 Gram(s) IV Push once  enoxaparin Injectable 40 milliGRAM(s) SubCutaneous every 12 hours  furosemide    Tablet 20 milliGRAM(s) Oral daily  glucagon  Injectable 1 milliGRAM(s) IntraMuscular once  insulin glargine Injectable (LANTUS) 18 Unit(s) SubCutaneous two times a day  insulin lispro (ADMELOG) corrective regimen sliding scale   SubCutaneous three times a day before meals  insulin lispro (ADMELOG) corrective regimen sliding scale   SubCutaneous at bedtime  lamoTRIgine 100 milliGRAM(s) Oral two times a day  lisinopril 40 milliGRAM(s) Oral daily  PARoxetine 20 milliGRAM(s) Oral daily  primidone 250 milliGRAM(s) Oral daily  primidone 50 milliGRAM(s) Oral daily  primidone 150 milliGRAM(s) Oral at bedtime  remdesivir  IVPB   IV Intermittent   remdesivir  IVPB 100 milliGRAM(s) IV Intermittent every 24 hours  simvastatin 40 milliGRAM(s) Oral at bedtime    MEDICATIONS  (PRN):  acetaminophen   Tablet .. 650 milliGRAM(s) Oral every 4 hours PRN Temp greater or equal to 38.5C (101.3F)  ALBUTerol    90 MICROgram(s) HFA Inhaler 2 Puff(s) Inhalation every 4 hours PRN Shortness of Breath and/or Wheezing  guaifenesin/dextromethorphan  Syrup 10 milliLiter(s) Oral every 4 hours PRN Cough      Allergies: NKDA    ROS:  SEE BELOW        ICU Vital Signs Last 24 Hrs  T(C): 38 (14 Dec 2020 05:01), Max: 39.4 (13 Dec 2020 22:15)  T(F): 100.4 (14 Dec 2020 05:01), Max: 102.9 (13 Dec 2020 22:15)  HR: 85 (14 Dec 2020 08:00) (79 - 97)  BP: 127/61 (14 Dec 2020 08:00) (85/46 - 140/87)  BP(mean): 76 (14 Dec 2020 08:00) (54 - 107)  ABP: --  ABP(mean): --  RR: 30 (14 Dec 2020 08:00) (20 - 39)  SpO2: 91% (14 Dec 2020 08:00) (80% - 94%)          I&O's Summary    13 Dec 2020 07:01  -  14 Dec 2020 07:00  --------------------------------------------------------  IN: 0 mL / OUT: 1750 mL / NET: -1750 mL        Physical Exam:  SEE BELOW                          14.7   8.38  )-----------( 199      ( 14 Dec 2020 06:22 )             46.1           139  |  103  |  20  ----------------------------<  251<H>  3.6   |  27  |  0.77    Ca    9.0      14 Dec 2020 06:22  Phos  3.6     1214  Mg     2.3         TPro  6.9  /  Alb  2.5<L>  /  TBili  0.4  /  DBili  0.1  /  AST  42<H>  /  ALT  29  /  AlkPhos  29<L>  1213      CARDIAC MARKERS ( 12 Dec 2020 13:43 )  0.045 ng/mL / x     / x     / x     / x                Urinalysis Basic - ( 12 Dec 2020 09:45 )    Color: Yellow / Appearance: very cloudy / S.025 / pH: x  Gluc: x / Ketone: Large  / Bili: Negative / Urobili: 1 mg/dL   Blood: x / Protein: 500 mg/dL / Nitrite: Positive   Leuk Esterase: Small / RBC: >50 /HPF / WBC 26-50   Sq Epi: x / Non Sq Epi: Moderate / Bacteria: Many        DVT Prophylaxis:                                                            Contraindication:     Advanced Directives:    Discussed with:    Visit Information:  Time spent excluding procedure:  45 cc mins    ** Time is exclusive of billed procedures and/or teaching and/or routine family updates.

## 2020-12-14 NOTE — PHARMACOTHERAPY INTERVENTION NOTE - COMMENTS
Pt is diagnosed of COVID and currently receiving dexamethasone 6 mg IV daily. She is also taking primidone 300 mg daily and 150 mg at bedtime. Primidone is a strong CYP 3A4 inducer which will decrease the level of dexamethasone by 97%. Recommend to increase dexamethasone 6 mg IV daily to 10 mg IV daily and continue monitor the level of effectiveness of dexamethasone.

## 2020-12-15 NOTE — DIETITIAN NUTRITION RISK NOTIFICATION - ADDITIONAL COMMENTS/DIETITIAN RECOMMENDATIONS
1) Glucerna 1.5 @ 35ml/hr (total volume 700ml) + 4 packs of Prosource TF daily (total provided daily including sedation calories= 1665 calories/ 102gm protein/ 530ml free water in formula)  2) Additional water bolus 150ml TID (total volume 450ml)  3) Maintain aspiration precautions, back of bed >35 degrees. (Prone position continue with TF infusing with patient in reverse Trendelenburg position at 10-25 degrees)  4) monitor weights daily  5) monitor lytes/labs/hydration replete as needed  6) Strict I & O's

## 2020-12-15 NOTE — DIETITIAN NUTRITION RISK NOTIFICATION - PROVIDER ATTESTATION
By signing this assessment you are acknowledging and agree with the diagnosis/diagnoses assigned by the Registered Dietitian

## 2020-12-15 NOTE — PROGRESS NOTE ADULT - SUBJECTIVE AND OBJECTIVE BOX
Date of service: 12-15-20 @ 09:31    pt seen and examined  on bipap this am o2 saturations 80s  febrile early this am  laying in bed, weak appearing    ROS: + fever, chills, denies dizziness, no HA, + cough, + sob, no abdominal pain, no diarrhea or constipation; no dysuria, no urinary frequency, no legs pain, no rashes    MEDICATIONS  (STANDING):  amLODIPine   Tablet 10 milliGRAM(s) Oral daily  aspirin  chewable 81 milliGRAM(s) Oral daily  cefTRIAXone Injectable. 1000 milliGRAM(s) IV Push every 24 hours  cisatracurium Infusion 3 MICROgram(s)/kG/Min (16.3 mL/Hr) IV Continuous <Continuous>  dexAMETHasone  IVPB 10 milliGRAM(s) IV Intermittent daily  dextrose 40% Gel 15 Gram(s) Oral once  dextrose 5%. 1000 milliLiter(s) (50 mL/Hr) IV Continuous <Continuous>  dextrose 50% Injectable 25 Gram(s) IV Push once  enoxaparin Injectable 40 milliGRAM(s) SubCutaneous every 12 hours  fentaNYL   Infusion... 0.5 MICROgram(s)/kG/Hr (2.27 mL/Hr) IV Continuous <Continuous>  furosemide    Tablet 20 milliGRAM(s) Oral daily  glucagon  Injectable 1 milliGRAM(s) IntraMuscular once  insulin glargine Injectable (LANTUS) 24 Unit(s) SubCutaneous two times a day  insulin lispro (ADMELOG) corrective regimen sliding scale   SubCutaneous three times a day before meals  insulin lispro (ADMELOG) corrective regimen sliding scale   SubCutaneous at bedtime  lamoTRIgine 100 milliGRAM(s) Oral two times a day  lisinopril 40 milliGRAM(s) Oral daily  PARoxetine 20 milliGRAM(s) Oral daily  primidone 250 milliGRAM(s) Oral daily  primidone 50 milliGRAM(s) Oral daily  primidone 150 milliGRAM(s) Oral at bedtime  remdesivir  IVPB   IV Intermittent   remdesivir  IVPB 100 milliGRAM(s) IV Intermittent every 24 hours  simvastatin 40 milliGRAM(s) Oral at bedtime    Vital Signs Last 24 Hrs  T(C): 37.3 (15 Dec 2020 09:41), Max: 38.5 (15 Dec 2020 05:54)  T(F): 99.1 (15 Dec 2020 09:41), Max: 101.3 (15 Dec 2020 05:54)  HR: 77 (15 Dec 2020 11:11) (71 - 95)  BP: 132/58 (15 Dec 2020 09:00) (92/46 - 142/65)  BP(mean): 75 (15 Dec 2020 09:00) (57 - 83)  RR: 25 (15 Dec 2020 09:00) (21 - 33)  SpO2: 88% (15 Dec 2020 11:11) (62% - 88%)      PE:  Constitutional: frail looking, on bipap   HEENT: NC/AT, EOMI, PERRLA, conjunctivae clear; ears and nose atraumatic; pharynx benign  Neck: supple; thyroid not palpable  Back: no tenderness  Respiratory: decreased breath sounds, crackles   Cardiovascular: S1S2 regular, no murmurs  Abdomen: soft, not tender, not distended, positive BS; liver and spleen WNL  Genitourinary: no suprapubic tenderness  Lymphatic: no LN palpable  Musculoskeletal: no muscle tenderness, no joint swelling or tenderness  Extremities: no pedal edema  Neurological/ Psychiatric:  moving all extremities  Skin: no rashes; no palpable lesions    Labs: all available labs reviewed                                      13.2   8.36  )-----------( 235      ( 15 Dec 2020 06:17 )             41.2     12-15    142  |  104  |  28<H>  ----------------------------<  263<H>  3.3<L>   |  31  |  0.69    Ca    8.8      15 Dec 2020 06:17  Phos  3.5     12-15  Mg     2.4     12-15    TPro  6.8  /  Alb  2.3<L>  /  TBili  0.3  /  DBili  0.1  /  AST  37  /  ALT  24  /  AlkPhos  26<L>  12-15             Urinalysis Basic - ( 12 Dec 2020 09:45 )    Color: Yellow / Appearance: very cloudy / S.025 / pH: x  Gluc: x / Ketone: Large  / Bili: Negative / Urobili: 1 mg/dL   Blood: x / Protein: 500 mg/dL / Nitrite: Positive   Leuk Esterase: Small / RBC: >50 /HPF / WBC 26-50   Sq Epi: x / Non Sq Epi: Moderate / Bacteria: Many    Culture - Urine (20 @ 09:45)   - Amikacin: S <=16   - Amoxicillin/Clavulanic Acid: S <=8/4   - Ampicillin: S <=8 These ampicillin results predict results for amoxicillin   - Ampicillin/Sulbactam: S <=4/2 Enterobacter, Citrobacter, and Serratia may develop resistance during prolonged therapy (3-4 days)   - Aztreonam: S <=4   - Cefazolin: S <=2 (MIC_CL_COM_[id number]) For uncomplicated UTI with K. pneumoniae, E. coli, or P. mirablis: SANCHO <=16 is sensitive and SANCHO >=32 is resistant. This also predicts results for oral agents cefaclor, cefdinir, cefpodoxime, cefprozil, cefuroxime axetil, cephalexin and locarbef for uncomplicated UTI. Note that some isolates may be susceptible to these agents while testing resistant to cefazolin.   - Cefepime: S <=2   - Cefoxitin: S <=8   - Ceftriaxone: S <=1 Enterobacter, Citrobacter, and Serratia may develop resistance during prolonged therapy   - Ciprofloxacin: S <=0.25   - Ertapenem: S <=0.5   - Gentamicin: S <=2   - Imipenem: S <=1   - Levofloxacin: S <=0.5   - Meropenem: S <=1   - Nitrofurantoin: S <=32 Should not be used to treat pyelonephritis   - Piperacillin/Tazobactam: S <=8   - Tigecycline: S <=2   - Tobramycin: S <=2   - Trimethoprim/Sulfamethoxazole: S <=0.5/9.5   Specimen Source: .Urine Clean Catch (Midstream)   Culture Results:   >100,000 CFU/ml Escherichia coli   Culture - Blood (20 @ 09:21)   - Coagulase negative Staphylococcus: Detec   Gram Stain:   Growth in aerobic bottle: Gram positive cocci in pairs   Specimen Source: .Blood Blood-Peripheral   Organism: Blood Culture PCR   Culture Results:   Growth in aerobic bottle: Gram positive cocci in pairs   "Due to technical problems, Proteus sp. will Not be reported as part of   the BCID panel until further notice"     Radiology: all available radiological tests reviewed  < from: Xray Chest 1 View- PORTABLE-Urgent (12.12.20 @ 10:28) >    EXAM:  XR CHEST PORTABLE URGENT 1V                            PROCEDURE DATE:  2020          INTERPRETATION:  AP chest on 2020 at 10:22 AM. Patient has sepsis, cough, and shortness of breath.    COMPARISON: None available.    Heart may be enlarged.    There is a slight infiltrate in the left midlung field and possibly another in the retrocardiac area.    Small infiltrate off right upper hilum is also possible.    IMPRESSION: Small infiltrates as above.        Advanced directives addressed: full resuscitation

## 2020-12-15 NOTE — DIETITIAN NUTRITION RISK NOTIFICATION - TREATMENT: THE FOLLOWING DIET HAS BEEN RECOMMENDED
Diet, NPO with Tube Feed:   Tube Feeding Modality: Orogastric  Glucerna 1.5 [place] (GLUCERNA1.5)  Total Volume for 24 Hours (mL): 840  Continuous  Starting Tube Feed Rate {mL per Hour}: 35  Until Goal Tube Feed Rate (mL per Hour): 35  Tube Feed Duration (in Hours): 24  Tube Feed Start Time: 00:00  No Carb Prosource TF     Qty per Day:  4 (12-15-20 @ 14:50) [Available for Activation]

## 2020-12-15 NOTE — PROGRESS NOTE ADULT - ASSESSMENT
IMP:    71 y/o female pt of size with HTN, DM, HL, cognitive impairment and GALE/OHS admitted with viral PNA secondary to COVID  Acute type 1 resp failure  E. coli UTI on CTX    High risk for acute decompensation and deterioration including death     Plan:    Cont with BIPAP  Will eval for PPN  Cont with Rem/Deca (3)  Cont with CTX (4) total of 5 days  Follow O2 sat--does not need intubation at this time but is at high risk for intubation  Increase lantus to 24 bid  FS with insulin coverage--keep FS < 180  HOB > 30  DVT prophy--SCD and LMWH bid    CCU care-- d/w ICU staff on multi disciplinary rounds and pt-- All concerns addressed including but not limited to diagnosis, treatment plan and overall prognosis

## 2020-12-15 NOTE — PROGRESS NOTE ADULT - ASSESSMENT
71 yo obese female with pmh niddm, htn, mild cognitive impairment, ?GALE/OHS, presents with complaints of 1 week onset of productive cough, weakness, mylagia, lethargy, diarrhea and shortness of breath. Daughter and  both have covid and live with her. Daughter is currently hospitalized for covid pna. Pt is lethargic but arousable on command. Requiring 4-5 L NC saturating 94%. Tachypneic but not using accessory muscles. History limited due to condition. Vital stable. CXR c/w bilateral patchy infiltrates. Elevated inflammatory markers. Here UA positive, concern for UTI, covid-19 pcr positive, was given IV cefepime/decadron/remdesivir, tx to CCU, hypoxic on NC, now on high flow o2.    1. acute respiratory failure- worsening. covid-19 viral syndrome. multifocal pneumonia. UTI  - on IV rocephin 1gm daily #3 s/p cefepime #1 for UTI  - urine cx growing Ecoli, f/u sensitivities,  blood cx staph hominis CONS - c/w contaminant  - on remdesivir #4 monitor renal/hepatic function closely on therapy  - on decadron 6mg daily #4  - continue with antiviral, steroids/abx  - 5 day course abx  - isolation precautions  - fu cbc  - supportive care  - tolerating abx well so far; no side effects noted  - reason for abx use and side effects reviewed with patient  - supportive care  - high risk for intubation    2. other issues - care per medicine  71 yo obese female with pmh niddm, htn, mild cognitive impairment, ?GALE/OHS, presents with complaints of 1 week onset of productive cough, weakness, mylagia, lethargy, diarrhea and shortness of breath. Daughter and  both have covid and live with her. Daughter is currently hospitalized for covid pna. Pt is lethargic but arousable on command. Requiring 4-5 L NC saturating 94%. Tachypneic but not using accessory muscles. History limited due to condition. Vital stable. CXR c/w bilateral patchy infiltrates. Elevated inflammatory markers. Here UA positive, concern for UTI, covid-19 pcr positive, was given IV cefepime/decadron/remdesivir, tx to CCU, hypoxic on NC, now on high flow o2.    1. acute respiratory failure- worsening. covid-19 viral syndrome. multifocal pneumonia. UTI  - on IV rocephin 1gm daily #3 s/p cefepime #1 for UTI  - urine cx growing Ecoli, f/u sensitivities,  blood cx staph hominis CONS - c/w contaminant  - on remdesivir #3 monitor renal/hepatic function closely on therapy  - on decadron 6mg daily #4  - continue with antiviral, steroids/abx  - 5 day course abx  - isolation precautions  - fu cbc  - supportive care  - tolerating abx well so far; no side effects noted  - reason for abx use and side effects reviewed with patient  - supportive care  - high risk for intubation    2. other issues - care per medicine

## 2020-12-15 NOTE — PROGRESS NOTE ADULT - SUBJECTIVE AND OBJECTIVE BOX
Hospital # 3  CCU # 2    CC:  COVID    HPI:    69 y/o female pt of size with HTN, DM, HL, cognitive impairment and GALE/OHS presents with complaints of 1 week onset of productive cough, weakness, myalgia, lethargy, diarrhea and shortness of breath. Daughter and  both have covid and live with her. Daughter is currently hospitalized for covid pna    12/14:  Pt seen and examined in CCU--on BIPAP--O2 sat high 80s--Awake--increased WOB--able to speak in full sentences.  FS not well controlled.     12/15:  Cannot tolerate being off BIPAP.  Not eating.  Awake and alert.  Mod WOB.  O2 sat mid 80s    PMH:  As above.     PSH:  As above.     FH: Non Contributory other than those listed in HPI    Social History:  NC    MEDICATIONS  (STANDING):  amLODIPine   Tablet 10 milliGRAM(s) Oral daily  aspirin  chewable 81 milliGRAM(s) Oral daily  cefTRIAXone Injectable. 1000 milliGRAM(s) IV Push every 24 hours  dexAMETHasone  IVPB 10 milliGRAM(s) IV Intermittent daily  dextrose 40% Gel 15 Gram(s) Oral once  dextrose 5%. 1000 milliLiter(s) (50 mL/Hr) IV Continuous <Continuous>  dextrose 50% Injectable 25 Gram(s) IV Push once  enoxaparin Injectable 40 milliGRAM(s) SubCutaneous every 12 hours  furosemide    Tablet 20 milliGRAM(s) Oral daily  glucagon  Injectable 1 milliGRAM(s) IntraMuscular once  insulin glargine Injectable (LANTUS) 24 Unit(s) SubCutaneous two times a day  insulin lispro (ADMELOG) corrective regimen sliding scale   SubCutaneous three times a day before meals  insulin lispro (ADMELOG) corrective regimen sliding scale   SubCutaneous at bedtime  lamoTRIgine 100 milliGRAM(s) Oral two times a day  lisinopril 40 milliGRAM(s) Oral daily  PARoxetine 20 milliGRAM(s) Oral daily  primidone 250 milliGRAM(s) Oral daily  primidone 50 milliGRAM(s) Oral daily  primidone 150 milliGRAM(s) Oral at bedtime  remdesivir  IVPB   IV Intermittent   remdesivir  IVPB 100 milliGRAM(s) IV Intermittent every 24 hours  simvastatin 40 milliGRAM(s) Oral at bedtime    MEDICATIONS  (PRN):  acetaminophen   Tablet .. 650 milliGRAM(s) Oral every 4 hours PRN Temp greater or equal to 38.5C (101.3F)  ALBUTerol    90 MICROgram(s) HFA Inhaler 2 Puff(s) Inhalation every 4 hours PRN Shortness of Breath and/or Wheezing  guaifenesin/dextromethorphan  Syrup 10 milliLiter(s) Oral every 4 hours PRN Cough      Allergies: NKDA    ROS:  SEE BELOW        ICU Vital Signs Last 24 Hrs  T(C): 37.4 (15 Dec 2020 08:00), Max: 38.5 (15 Dec 2020 05:54)  T(F): 99.4 (15 Dec 2020 08:00), Max: 101.3 (15 Dec 2020 05:54)  HR: 84 (15 Dec 2020 08:00) (71 - 95)  BP: 138/61 (15 Dec 2020 08:00) (92/46 - 144/64)  BP(mean): 80 (15 Dec 2020 08:00) (57 - 83)  ABP: --  ABP(mean): --  RR: 28 (15 Dec 2020 08:00) (21 - 33)  SpO2: 83% (15 Dec 2020 08:00) (62% - 91%)          I&O's Summary    14 Dec 2020 07:01  -  15 Dec 2020 07:00  --------------------------------------------------------  IN: 720 mL / OUT: 675 mL / NET: 45 mL        Physical Exam:  SEE BELOW                          13.2   8.36  )-----------( 235      ( 15 Dec 2020 06:17 )             41.2       12-15    142  |  104  |  28<H>  ----------------------------<  263<H>  3.3<L>   |  31  |  0.69    Ca    8.8      15 Dec 2020 06:17  Phos  3.5     12-15  Mg     2.4     12-15    TPro  6.8  /  Alb  2.3<L>  /  TBili  0.3  /  DBili  0.1  /  AST  37  /  ALT  24  /  AlkPhos  26<L>  12-15                    DVT Prophylaxis:                                                            Contraindication:     Advanced Directives:    Discussed with:    Visit Information:  Time spent excluding procedure:  45 cc mins    ** Time is exclusive of billed procedures and/or teaching and/or routine family updates.

## 2020-12-15 NOTE — PROGRESS NOTE ADULT - SUBJECTIVE AND OBJECTIVE BOX
Patient is a 70y old  Female who presents with a chief complaint of sob and cough (15 Dec 2020 09:30)      BRIEF HOSPITAL COURSE: 70y Female pmhx HTN, DM, HLD, cognitive impairment, GALE/OHS prsented with complaint of 1 week productive cough. Admitted for COVID 19+ with acute hypoxic respiratory failure. Patient respiratory status continued to decline despite NIV and intubated earlier today. Hospital course further complicated by E.coli UTI.    At bedside patient currently proned on 28/400/100/+20    PAST MEDICAL & SURGICAL HISTORY:  HLD (hyperlipidemia)    Mild cognitive impairment    DM (diabetes mellitus)    HTN (hypertension)          Hosp day #3d    Vent day #  Mode: AC/ CMV (Assist Control/ Continuous Mandatory Ventilation)  RR (machine): 28  TV (machine): 400  FiO2: 100  PEEP: 20  ITime: 1  MAP: 20  PIP: 36        Vital signs / Reviewed and Physical Exam Performed where pertinent and urgently required    Lab / Radiology  studies / ABG / Meds -  reviewed and interpreted into the assessment and treatment plan.      Assessment/Plan/Therapeutic interventions      Neuro - Full Sedation w/ Propofol gtt and Fentanyl gtt. Additional neuromuscular blockade w/ Nimbex gtt to facilitate safe ventilation    CV -  Pressor support as needed to maintain MAP 65           Avoiding fluid challenges          QTC monitoring while on Azithromycin and Hydroxychloroquine.    Pulm -  Acute hypoxic respiratory failure s/p emergent inubation earlier              Decrease PEEP to +17. Further titrate as tolerated.                ARDS-NET 4-6cc/kg IBW TV as able to maintain plateau pressures <30               Currently proned, will supine in AM.                Vent bundle Reviewed     GI -  PPI  Enteric feeds as tolerated in tandem with NMB and prone ventilation    Renal - Even to negative fluid balance as tolerated by hemodynamics and renal fx.  Feeds to be provided in lieu of IVF.     Heme -  Pharmacologic DVT PPx  in addition to SCD's    ID - COVID 19+ pneumonia. Remdisivir 100mg and Decadron. Additional Ceftriaxone for E.coli UTI. ABX discontinuation based on discussion with ID in conjunction with clinical features, culture data, and judicious procalcitonin monitoring.      Endo -  Aggressive glycemic control to limit FS glucose to < 180mg/dl.      COVID 19 specific considerations and therapeutic  options based on the available and rapidly changing literature    Goals of care considerations:  Ongoing assessment for patient specific treatment options based on progression or decline.  I have involved the family with updates and requests in guidance for medical decision making.          38  Minutes of critical care tiem spent in the management of this critically ill COVID-19 patient/PUI patient with continuous assessments and interventions based on the interpretation of multiple databases.

## 2020-12-16 NOTE — PROGRESS NOTE ADULT - SUBJECTIVE AND OBJECTIVE BOX
Hospital # 4  CCU # 3  Vent # 1  L SCV CVL # 1  R fem A-line # 1  Cis # 2    CC:  COVID    HPI:    71 y/o female pt of size with HTN, DM, HL, cognitive impairment and GALE/OHS presents with complaints of 1 week onset of productive cough, weakness, myalgia, lethargy, diarrhea and shortness of breath. Daughter and  both have covid and live with her. Daughter is currently hospitalized for covid pna    12/14:  Pt seen and examined in CCU--on BIPAP--O2 sat high 80s--Awake--increased WOB--able to speak in full sentences.  FS not well controlled.     12/15:  Cannot tolerate being off BIPAP.  Not eating.  Awake and alert.  Mod WOB.  O2 sat mid 80s    12/16:  Above noted.  emergently intubated yesterday.  On prop and cis gtts. No pressors.  Proned.  Ppressure 34.      PMH:  As above.     PSH:  As above.     FH: Non Contributory other than those listed in HPI    Social History:  Unobtainable due to clinical condition     MEDICATIONS  (STANDING):  amLODIPine   Tablet 10 milliGRAM(s) Oral daily  aspirin  chewable 81 milliGRAM(s) Oral daily  cefTRIAXone Injectable. 1000 milliGRAM(s) IV Push every 24 hours  chlorhexidine 0.12% Liquid 15 milliLiter(s) Oral Mucosa every 12 hours  cisatracurium Infusion 3 MICROgram(s)/kG/Min (16.3 mL/Hr) IV Continuous <Continuous>  dexAMETHasone  IVPB 10 milliGRAM(s) IV Intermittent daily  dextrose 40% Gel 15 Gram(s) Oral once  dextrose 5%. 1000 milliLiter(s) (50 mL/Hr) IV Continuous <Continuous>  dextrose 50% Injectable 25 Gram(s) IV Push once  enoxaparin Injectable 40 milliGRAM(s) SubCutaneous every 12 hours  fentaNYL   Infusion... 0.5 MICROgram(s)/kG/Hr (2.27 mL/Hr) IV Continuous <Continuous>  furosemide    Tablet 20 milliGRAM(s) Oral daily  glucagon  Injectable 1 milliGRAM(s) IntraMuscular once  insulin glargine Injectable (LANTUS) 30 Unit(s) SubCutaneous two times a day  insulin lispro (ADMELOG) corrective regimen sliding scale   SubCutaneous every 6 hours  lamoTRIgine 100 milliGRAM(s) Oral two times a day  lisinopril 40 milliGRAM(s) Oral daily  norepinephrine Infusion 0.05 MICROgram(s)/kG/Min (8.5 mL/Hr) IV Continuous <Continuous>  PARoxetine 20 milliGRAM(s) Oral daily  primidone 250 milliGRAM(s) Oral daily  primidone 50 milliGRAM(s) Oral daily  primidone 150 milliGRAM(s) Oral at bedtime  propofol Infusion 30 MICROgram(s)/kG/Min (16.3 mL/Hr) IV Continuous <Continuous>  remdesivir  IVPB   IV Intermittent   remdesivir  IVPB 100 milliGRAM(s) IV Intermittent every 24 hours  simvastatin 40 milliGRAM(s) Oral at bedtime    MEDICATIONS  (PRN):  acetaminophen   Tablet .. 650 milliGRAM(s) Oral every 4 hours PRN Temp greater or equal to 38.5C (101.3F)  ALBUTerol    90 MICROgram(s) HFA Inhaler 2 Puff(s) Inhalation every 4 hours PRN Shortness of Breath and/or Wheezing  guaifenesin/dextromethorphan  Syrup 10 milliLiter(s) Oral every 4 hours PRN Cough      Allergies: NKDA    ROS:  SEE BELOW        ICU Vital Signs Last 24 Hrs  T(C): 38.4 (16 Dec 2020 06:00), Max: 38.6 (15 Dec 2020 15:43)  T(F): 101.2 (16 Dec 2020 06:00), Max: 101.4 (15 Dec 2020 15:43)  HR: 79 (16 Dec 2020 07:00) (68 - 99)  BP: 107/54 (16 Dec 2020 07:00) (71/36 - 152/79)  BP(mean): 68 (16 Dec 2020 07:00) (43 - 97)  ABP: 117/64 (16 Dec 2020 07:00) (61/38 - 177/84)  ABP(mean): 82 (16 Dec 2020 07:00) (46 - 115)  RR: 28 (16 Dec 2020 07:00) (24 - 32)  SpO2: 98% (16 Dec 2020 07:00) (66% - 100%)      Mode: AC/ CMV (Assist Control/ Continuous Mandatory Ventilation)  RR (machine): 28  TV (machine): 0.36  FiO2: 80  PEEP: 17  ITime: 1  MAP: 20  PIP: 39      I&O's Summary    15 Dec 2020 07:01  -  16 Dec 2020 07:00  --------------------------------------------------------  IN: 1897.5 mL / OUT: 475 mL / NET: 1422.5 mL        Physical Exam:  SEE BELOW                          12.3   10.06 )-----------( 266      ( 16 Dec 2020 05:45 )             38.0       12-16    138  |  102  |  42<H>  ----------------------------<  307<H>  3.7   |  29  |  0.99    Ca    8.5      16 Dec 2020 05:45  Phos  4.6     12-16  Mg     2.4     12-16    TPro  6.5  /  Alb  2.0<L>  /  TBili  0.3  /  DBili  0.1  /  AST  35  /  ALT  27  /  AlkPhos  38<L>  12-16            ABG - ( 16 Dec 2020 05:43 )  pH, Arterial: 7.39  pH, Blood: x     /  pCO2: 45    /  pO2: 151   / HCO3: 27    / Base Excess: 2.0   /  SaO2: 99                      DVT Prophylaxis:                                                            Contraindication:     Advanced Directives:    Discussed with:    Visit Information:  Time spent excluding procedure:  75 cc mins    ** Time is exclusive of billed procedures and/or teaching and/or routine family updates.

## 2020-12-16 NOTE — PROGRESS NOTE ADULT - SUBJECTIVE AND OBJECTIVE BOX
Date of service: 20 @ 11:37    pt seen and examined  intubated 12/15 d/t worsening respiratory failure  on ventilatory support  having persistent fevers     ROS: unable to obtain d/t medical condition    MEDICATIONS  (STANDING):  amLODIPine   Tablet 10 milliGRAM(s) Oral daily  aspirin  chewable 81 milliGRAM(s) Oral daily  cefTRIAXone Injectable. 1000 milliGRAM(s) IV Push every 24 hours  chlorhexidine 0.12% Liquid 15 milliLiter(s) Oral Mucosa every 12 hours  cisatracurium Infusion 3 MICROgram(s)/kG/Min (16.3 mL/Hr) IV Continuous <Continuous>  dexAMETHasone  IVPB 10 milliGRAM(s) IV Intermittent daily  dextrose 40% Gel 15 Gram(s) Oral once  dextrose 5%. 1000 milliLiter(s) (50 mL/Hr) IV Continuous <Continuous>  dextrose 50% Injectable 25 Gram(s) IV Push once  enoxaparin Injectable 40 milliGRAM(s) SubCutaneous every 12 hours  famotidine    Tablet 20 milliGRAM(s) Oral two times a day  furosemide    Tablet 20 milliGRAM(s) Oral daily  glucagon  Injectable 1 milliGRAM(s) IntraMuscular once  insulin glargine Injectable (LANTUS) 30 Unit(s) SubCutaneous two times a day  insulin lispro (ADMELOG) corrective regimen sliding scale   SubCutaneous every 6 hours  lamoTRIgine 100 milliGRAM(s) Oral two times a day  lisinopril 40 milliGRAM(s) Oral daily  PARoxetine 20 milliGRAM(s) Oral daily  polyethylene glycol 3350 17 Gram(s) Oral daily  primidone 250 milliGRAM(s) Oral daily  primidone 50 milliGRAM(s) Oral daily  primidone 150 milliGRAM(s) Oral at bedtime  propofol Infusion 30 MICROgram(s)/kG/Min (16.3 mL/Hr) IV Continuous <Continuous>  remdesivir  IVPB   IV Intermittent   remdesivir  IVPB 100 milliGRAM(s) IV Intermittent every 24 hours  simvastatin 40 milliGRAM(s) Oral at bedtime    Vital Signs Last 24 Hrs  T(C): 38.6 (16 Dec 2020 10:00), Max: 38.6 (15 Dec 2020 15:43)  T(F): 101.5 (16 Dec 2020 10:00), Max: 101.5 (16 Dec 2020 10:00)  HR: 85 (16 Dec 2020 11:00) (68 - 85)  BP: 103/51 (16 Dec 2020 08:00) (71/36 - 140/69)  BP(mean): 62 (16 Dec 2020 08:00) (43 - 86)  RR: 28 (16 Dec 2020 07:00) (27 - 28)  SpO2: 94% (16 Dec 2020 11:00) (94% - 100%)    PE:  Constitutional: frail looking, intubated  HEENT: NC/AT, EOMI, PERRLA, conjunctivae clear; ears and nose atraumatic; pharynx benign  Neck: supple; thyroid not palpable  Back: no tenderness  Respiratory: decreased breath sounds, crackles   Cardiovascular: S1S2 regular, no murmurs  Abdomen: soft, not tender, not distended, positive BS; liver and spleen WNL  Genitourinary: no suprapubic tenderness  Lymphatic: no LN palpable  Musculoskeletal: no muscle tenderness, no joint swelling or tenderness  Extremities: no pedal edema  Neurological/ Psychiatric:  sedated/intubated  Skin: no rashes; no palpable lesions    Labs: all available labs reviewed                                   12.3   10.06 )-----------( 266      ( 16 Dec 2020 05:45 )             38.0     -    138  |  102  |  42<H>  ----------------------------<  307<H>  3.7   |  29  |  0.99    Ca    8.5      16 Dec 2020 05:45  Phos  4.6       Mg     2.4         TPro  6.5  /  Alb  2.0<L>  /  TBili  0.3  /  DBili  0.1  /  AST  35  /  ALT  27  /  AlkPhos  38<L>          Ferritin, Serum: 521 ng/mL (20 @ 13:43)  C-Reactive Protein, Serum: 15.70 mg/dL (20 @ 09:21)  D-Dimer Assay, Quantitative: 303 ng/mL DDU (20 @ 09:21)  Ferritin, Serum: 529 ng/mL (20 @ 09:21     Urinalysis Basic - ( 12 Dec 2020 09:45 )    Color: Yellow / Appearance: very cloudy / S.025 / pH: x  Gluc: x / Ketone: Large  / Bili: Negative / Urobili: 1 mg/dL   Blood: x / Protein: 500 mg/dL / Nitrite: Positive   Leuk Esterase: Small / RBC: >50 /HPF / WBC 26-50   Sq Epi: x / Non Sq Epi: Moderate / Bacteria: Many    Culture - Urine (20 @ 09:45)   - Amikacin: S <=16   - Amoxicillin/Clavulanic Acid: S <=8/4   - Ampicillin: S <=8 These ampicillin results predict results for amoxicillin   - Ampicillin/Sulbactam: S <=4/2 Enterobacter, Citrobacter, and Serratia may develop resistance during prolonged therapy (3-4 days)   - Aztreonam: S <=4   - Cefazolin: S <=2 (MIC_CL_COM_ENTERIC_CEFAZU) For uncomplicated UTI with K. pneumoniae, E. coli, or P. mirablis: SANCHO <=16 is sensitive and SANCHO >=32 is resistant. This also predicts results for oral agents cefaclor, cefdinir, cefpodoxime, cefprozil, cefuroxime axetil, cephalexin and locarbef for uncomplicated UTI. Note that some isolates may be susceptible to these agents while testing resistant to cefazolin.   - Cefepime: S <=2   - Cefoxitin: S <=8   - Ceftriaxone: S <=1 Enterobacter, Citrobacter, and Serratia may develop resistance during prolonged therapy   - Ciprofloxacin: S <=0.25   - Ertapenem: S <=0.5   - Gentamicin: S <=2   - Imipenem: S <=1   - Levofloxacin: S <=0.5   - Meropenem: S <=1   - Nitrofurantoin: S <=32 Should not be used to treat pyelonephritis   - Piperacillin/Tazobactam: S <=8   - Tigecycline: S <=2   - Tobramycin: S <=2   - Trimethoprim/Sulfamethoxazole: S <=0.5/9.5   Specimen Source: .Urine Clean Catch (Midstream)   Culture Results:   >100,000 CFU/ml Escherichia coli   Culture - Blood (20 @ 09:21)   - Coagulase negative Staphylococcus: Detec   Gram Stain:   Growth in aerobic bottle: Gram positive cocci in pairs   Specimen Source: .Blood Blood-Peripheral   Organism: Blood Culture PCR   Culture Results:   Growth in aerobic bottle: Gram positive cocci in pairs   "Due to technical problems, Proteus sp. will Not be reported as part of   the BCID panel until further notice"     Radiology: all available radiological tests reviewed  < from: Xray Chest 1 View- PORTABLE-Urgent (20 @ 10:28) >    EXAM:  XR CHEST PORTABLE URGENT 1V                            PROCEDURE DATE:  2020          INTERPRETATION:  AP chest on 2020 at 10:22 AM. Patient has sepsis, cough, and shortness of breath.    COMPARISON: None available.    Heart may be enlarged.    There is a slight infiltrate in the left midlung field and possibly another in the retrocardiac area.    Small infiltrate off right upper hilum is also possible.    IMPRESSION: Small infiltrates as above.        Advanced directives addressed: full resuscitation

## 2020-12-16 NOTE — PROGRESS NOTE ADULT - ASSESSMENT
71 yo obese female with pmh niddm, htn, mild cognitive impairment, ?GALE/OHS, presents with complaints of 1 week onset of productive cough, weakness, mylagia, lethargy, diarrhea and shortness of breath. Daughter and  both have covid and live with her. Daughter is currently hospitalized for covid pna. Pt is lethargic but arousable on command. Requiring 4-5 L NC saturating 94%. Tachypneic but not using accessory muscles. History limited due to condition. Vital stable. CXR c/w bilateral patchy infiltrates. Elevated inflammatory markers. Here UA positive, concern for UTI, covid-19 pcr positive, was given IV cefepime/decadron/remdesivir, tx to CCU, hypoxic on NC, now on high flow o2.    1. acute respiratory failure. covid-19 viral syndrome. multifocal pneumonia. UTI  - on IV rocephin 1gm daily #3 s/p cefepime #1 for UTI  - urine cx growing Ecoli, f/u sensitivities,  blood cx staph hominis CONS - c/w contaminant  - on remdesivir #4 monitor renal/hepatic function closely on therapy  - on decadron 6mg daily #5  - continue with antiviral, steroids  - 5 day course abx  - isolation precautions  - fu cbc  - supportive care  - tolerating abx well so far; no side effects noted  - reason for abx use and side effects reviewed with patient  - supportive care    2. other issues - care per medicine

## 2020-12-16 NOTE — PROGRESS NOTE ADULT - ASSESSMENT
IMP:    69 y/o female pt of size with HTN, DM, HL, cognitive impairment and GALE/OHS admitted with viral PNA secondary to COVID  Acute type 1 resp failure  ARDS  E. coli UTI on CTX  Severe Prot [place] malnutrition     OGT placed on 12/15  , tip below the diaphragm for meds/nutrition     High risk for acute decompensation and deterioration including death.  Critically ill     Plan:    Full vent support--LPV strategy to maintain plateau pressures < 30--High PEEP/low TV--Permissive hypercapnea and acidemia.  Decrease  and FIO2 80.  Plat pressures 30-31.  Check ABG.  ?? Prone  Prop and Cis gtts  TF   HOB > 30  Bowel nabil  Cont with Rem/Deca (4)  Cont with CTX (5) total of 5 days  Increase lantus to 30 bid  FS with insulin coverage--keep FS < 180  DVT prophy--SCD and LMWH bid    CCU care-- d/w ICU staff on multi disciplinary rounds and pt-- All concerns addressed including but not limited to diagnosis, treatment plan and overall prognosis

## 2020-12-17 NOTE — PROGRESS NOTE ADULT - ASSESSMENT
IMP:    69 y/o female pt of size with HTN, DM, HL, cognitive impairment and GALE/OHS admitted with viral PNA secondary to COVID  Acute type 1 resp failure  ARDS  E. coli UTI on CTX  Severe Prot Westley malnutrition     OGT placed on 12/15  , tip below the diaphragm for meds/nutrition     High risk for acute decompensation and deterioration including death.  Critically ill     Plan:    Full vent support--LPV strategy to maintain plateau pressures < 30--High PEEP/low TV--Permissive hypercapnea and acidemia.  Decrease  and FIO2 70 and PEEP 18. Check ABG.  ?? Prone  Prop and Cis gtts  TF   HOB > 30  Bowel nabil  Cont with Rem/Deca (5)  Cont with CTX (5) total of 7 days  Increase lantus to 30 bid  FS with insulin coverage--keep FS < 180.  Increase Lantus to 34 bid  DVT prophy--SCD and LMWH bid    CCU care-- d/w ICU staff on multi disciplinary rounds and pt-- All concerns addressed including but not limited to diagnosis, treatment plan and overall prognosis  IMP:    71 y/o female pt of size with HTN, DM, HL, cognitive impairment and GALE/OHS admitted with viral PNA secondary to COVID  Acute type 1 resp failure  ARDS  E. coli UTI on CTX  Severe Prot Westley malnutrition     OGT placed on 12/15  , tip below the diaphragm for meds/nutrition     High risk for acute decompensation and deterioration including death.  Critically ill     Plan:    Full vent support--LPV strategy to maintain plateau pressures < 30--High PEEP/low TV--Permissive hypercapnea and acidemia.  Decrease  and FIO2 70 and PEEP 18. Check ABG.  ?? Prone  Prop and Cis gtts  TF   HOB > 30  Bowel nabil  Cont with Rem/Deca (5)  Cont with CTX (5)--stop after today dose  Increase lantus to 30 bid  FS with insulin coverage--keep FS < 180.  Increase Lantus to 34 bid  DVT prophy--SCD and LMWH bid    CCU care-- d/w ICU staff on multi disciplinary rounds and pt-- All concerns addressed including but not limited to diagnosis, treatment plan and overall prognosis

## 2020-12-17 NOTE — PROGRESS NOTE ADULT - SUBJECTIVE AND OBJECTIVE BOX
HPI:    70y Female pmhx HTN, DM, HLD, cognitive impairment, GALE/OHS admit  with complaint of 1 week productive cough. Admitted for COVID 19+ with acute hypoxic respiratory failure. Patient respiratory status continued to decline despite NIV and intubated earlier today. Hospital course further complicated by E.coli UTI.    COVID 19 ARDS type 1 resp failure sedate paralyzed  febrile today.           Hospital  12/12  CCU  12/13  Vent 12/15  L SCV CVL 12/15  R fem A-line # 2  Change to R radial 12/17      Vital signs/reviewed and physical exam performed where pertinent and urgently required.    Lab/radiology studies/ABG/meds reviewed and interpreted into the assesment and treatment plan.    Assessment/Plan/Therapeutic interventions      Neuro: Sedation neuromuscular blockade as needed to facilitate safe ventilation. Propofol/cisatracurium.  Add fentanyl low dose     Cor:  Pressor support as needed to maintain MAP 65.  Avoiding fluid challenges.   BP OK on antihypertensives    Pulm:  ARDS-NET 4-6cc/kg IBW TV as able to maintain plateau pressures <30. Prone ventilation consideration as feasible.  Pa02/Fi02 < 150 on Fi02 >60% and PEEP at least 5.  Decadron 10 qd will discuss 6 mg     GI:  H2 blockers   Enteric feeds as tolerated     Renal: Even to negative fluid balance as tolerated by hemodynamics and renal fx.   lasix     Heme:  Pharmacologic DVT P in addition to SCD's in low risk,  full AC in high risk  following D- Dimer clinical course and doppler studies where appropriate.   Lovenox 40 bid      ID: ABX discontinuation based on discussion with ID in conjunction with clinical features, culture data, and judicious procalcitonin monitoring.   CTX stopped  e coli UTI.  CON staph   still febrile related to COVID.      Endo Aggressive glycemic control to limit FS glucose to < 180mg/dl.  May need to start insulin infusion.          COVID 19 specific considerations and therapeutic options based on the available and rapidly changing literature    Goals of care considerations:  Ongoing assessment for patient specific treatment options based on progression or decline.  I have involved the family with updates and requests in guidance for medical decision making.      32 minutes of critical care time spent in the management of this critically ill COVID-19 patient with continuous assessments and interventions based on the interpretation of multiple databases.

## 2020-12-17 NOTE — PROGRESS NOTE ADULT - SUBJECTIVE AND OBJECTIVE BOX
Hospital # 5  CCU # 4  Vent # 2  L SCV CVL # 2  R fem A-line # 2  Cis # 3    CC:  COVID    HPI:    69 y/o female pt of size with HTN, DM, HL, cognitive impairment and GALE/OHS presents with complaints of 1 week onset of productive cough, weakness, myalgia, lethargy, diarrhea and shortness of breath. Daughter and  both have covid and live with her. Daughter is currently hospitalized for covid pna    12/14:  Pt seen and examined in CCU--on BIPAP--O2 sat high 80s--Awake--increased WOB--able to speak in full sentences.  FS not well controlled.     12/15:  Cannot tolerate being off BIPAP.  Not eating.  Awake and alert.  Mod WOB.  O2 sat mid 80s    12/16:  Above noted.  emergently intubated yesterday.  On prop and cis gtts. No pressors.  Proned.  Ppressure 34    12/17:  Vented--Ppressure > 30.  On prop and cis gtts.  Tm 102.7  FS still high     PMH:  As above.     PSH:  As above.     FH: Non Contributory other than those listed in HPI    Social History:  Unobtainable due to clinical condition     MEDICATIONS  (STANDING):  amLODIPine   Tablet 10 milliGRAM(s) Oral daily  aspirin  chewable 81 milliGRAM(s) Oral daily  cefTRIAXone Injectable. 1000 milliGRAM(s) IV Push every 24 hours  chlorhexidine 0.12% Liquid 15 milliLiter(s) Oral Mucosa every 12 hours  cisatracurium Infusion 3 MICROgram(s)/kG/Min (16.3 mL/Hr) IV Continuous <Continuous>  dexAMETHasone  IVPB 10 milliGRAM(s) IV Intermittent daily  dextrose 40% Gel 15 Gram(s) Oral once  dextrose 5%. 1000 milliLiter(s) (50 mL/Hr) IV Continuous <Continuous>  dextrose 50% Injectable 25 Gram(s) IV Push once  enoxaparin Injectable 40 milliGRAM(s) SubCutaneous every 12 hours  famotidine    Tablet 20 milliGRAM(s) Oral two times a day  furosemide    Tablet 20 milliGRAM(s) Oral daily  glucagon  Injectable 1 milliGRAM(s) IntraMuscular once  insulin glargine Injectable (LANTUS) 34 Unit(s) SubCutaneous two times a day  insulin lispro (ADMELOG) corrective regimen sliding scale   SubCutaneous every 6 hours  lamoTRIgine 100 milliGRAM(s) Oral two times a day  lisinopril 40 milliGRAM(s) Oral daily  PARoxetine 20 milliGRAM(s) Oral daily  polyethylene glycol 3350 17 Gram(s) Oral daily  primidone 250 milliGRAM(s) Oral daily  primidone 50 milliGRAM(s) Oral daily  primidone 150 milliGRAM(s) Oral at bedtime  propofol Infusion 30 MICROgram(s)/kG/Min (16.3 mL/Hr) IV Continuous <Continuous>  remdesivir  IVPB   IV Intermittent   remdesivir  IVPB 100 milliGRAM(s) IV Intermittent every 24 hours  simvastatin 40 milliGRAM(s) Oral at bedtime    MEDICATIONS  (PRN):  acetaminophen   Tablet .. 650 milliGRAM(s) Oral every 4 hours PRN Temp greater or equal to 38.5C (101.3F)  ALBUTerol    90 MICROgram(s) HFA Inhaler 2 Puff(s) Inhalation every 4 hours PRN Shortness of Breath and/or Wheezing  guaifenesin/dextromethorphan  Syrup 10 milliLiter(s) Oral every 4 hours PRN Cough      Allergies: NKDA    ROS:  SEE BELOW        ICU Vital Signs Last 24 Hrs  T(C): 39.3 (17 Dec 2020 09:08), Max: 39.3 (17 Dec 2020 09:08)  T(F): 102.7 (17 Dec 2020 09:08), Max: 102.7 (17 Dec 2020 09:08)  HR: 96 (17 Dec 2020 09:04) (75 - 112)  BP: 106/55 (17 Dec 2020 06:00) (90/47 - 133/54)  BP(mean): 67 (17 Dec 2020 06:00) (54 - 72)  ABP: 133/74 (17 Dec 2020 07:00) (104/56 - 176/80)  ABP(mean): 94 (17 Dec 2020 07:00) (4 - 115)  RR: --  SpO2: 92% (17 Dec 2020 09:04) (81% - 97%)      Mode: AC/ CMV (Assist Control/ Continuous Mandatory Ventilation)  RR (machine): 28  TV (machine): 350  FiO2: 70  PEEP: 18  ITime: 1  MAP: 20  PIP: 33      I&O's Summary    16 Dec 2020 07:01  -  17 Dec 2020 07:00  --------------------------------------------------------  IN: 2369 mL / OUT: 900 mL / NET: 1469 mL        Physical Exam:  SEE BELOW                          12.5   9.25  )-----------( 312      ( 17 Dec 2020 05:30 )             39.3       12-17    139  |  103  |  41<H>  ----------------------------<  220<H>  3.7   |  30  |  0.96    Ca    8.6      17 Dec 2020 05:30  Phos  4.6     12-17  Mg     2.9     12-17    TPro  6.5  /  Alb  2.0<L>  /  TBili  0.3  /  DBili  0.1  /  AST  35  /  ALT  27  /  AlkPhos  38<L>  12-16            ABG - ( 17 Dec 2020 06:10 )  pH, Arterial: 7.35  pH, Blood: x     /  pCO2: 53    /  pO2: 61    / HCO3: 29    / Base Excess: 2.8   /  SaO2: 90                      DVT Prophylaxis:                                                            Contraindication:     Advanced Directives:    Discussed with:    Visit Information:  Time spent excluding procedure:  75 cc mins    ** Time is exclusive of billed procedures and/or teaching and/or routine family updates.

## 2020-12-17 NOTE — PROCEDURE NOTE - NSPROCDETAILS_GEN_ALL_CORE
location identified, draped/prepped, sterile technique used, needle inserted/introduced/positive blood return obtained via catheter/connected to a pressurized flush line/hemostasis with direct pressure, dressing applied/Seldinger technique/all materials/supplies accounted for at end of procedure

## 2020-12-18 NOTE — PROGRESS NOTE ADULT - SUBJECTIVE AND OBJECTIVE BOX
Hospital # 6  CCU # 5  Vent # 3  L SCV CVL # 3  R fem A-line # 2--REMOVED.  R Rad A-line # 1  Cis # 3    CC:  COVID    HPI:    69 y/o female pt of size with HTN, DM, HL, cognitive impairment and GALE/OHS presents with complaints of 1 week onset of productive cough, weakness, myalgia, lethargy, diarrhea and shortness of breath. Daughter and  both have covid and live with her. Daughter is currently hospitalized for covid pna    12/14:  Pt seen and examined in CCU--on BIPAP--O2 sat high 80s--Awake--increased WOB--able to speak in full sentences.  FS not well controlled.     12/15:  Cannot tolerate being off BIPAP.  Not eating.  Awake and alert.  Mod WOB.  O2 sat mid 80s    12/16:  Above noted.  emergently intubated yesterday.  On prop and cis gtts. No pressors.  Proned.  Ppressure 34    12/17:  Vented--Ppressure > 30.  On prop and cis gtts.  Tm 102.7  FS still high     12/18:  Vented--sedated.  65%/+ 16.  Meets criteria for prone    PMH:  As above.     PSH:  As above.     FH: Non Contributory other than those listed in HPI    Social History:      MEDICATIONS  (STANDING):  aspirin  chewable 81 milliGRAM(s) Oral daily  chlorhexidine 0.12% Liquid 15 milliLiter(s) Oral Mucosa every 12 hours  cisatracurium Infusion 3 MICROgram(s)/kG/Min (16.3 mL/Hr) IV Continuous <Continuous>  dexAMETHasone  IVPB 10 milliGRAM(s) IV Intermittent daily  dextrose 40% Gel 15 Gram(s) Oral once  dextrose 5%. 1000 milliLiter(s) (50 mL/Hr) IV Continuous <Continuous>  dextrose 50% Injectable 25 Gram(s) IV Push once  enoxaparin Injectable 40 milliGRAM(s) SubCutaneous every 12 hours  famotidine    Tablet 20 milliGRAM(s) Oral two times a day  fentaNYL   Infusion... 0.5 MICROgram(s)/kG/Hr (2.27 mL/Hr) IV Continuous <Continuous>  glucagon  Injectable 1 milliGRAM(s) IntraMuscular once  insulin glargine Injectable (LANTUS) 40 Unit(s) SubCutaneous two times a day  insulin lispro (ADMELOG) corrective regimen sliding scale   SubCutaneous every 6 hours  lamoTRIgine 100 milliGRAM(s) Oral two times a day  norepinephrine Infusion 0.05 MICROgram(s)/kG/Min (4.25 mL/Hr) IV Continuous <Continuous>  PARoxetine 20 milliGRAM(s) Oral daily  polyethylene glycol 3350 17 Gram(s) Oral daily  primidone 250 milliGRAM(s) Oral daily  primidone 50 milliGRAM(s) Oral daily  primidone 150 milliGRAM(s) Oral at bedtime  propofol Infusion 30 MICROgram(s)/kG/Min (16.3 mL/Hr) IV Continuous <Continuous>  remdesivir  IVPB 100 milliGRAM(s) IV Intermittent every 24 hours  simvastatin 40 milliGRAM(s) Oral at bedtime    MEDICATIONS  (PRN):  acetaminophen   Tablet .. 650 milliGRAM(s) Oral every 4 hours PRN Temp greater or equal to 38.5C (101.3F)  ALBUTerol    90 MICROgram(s) HFA Inhaler 2 Puff(s) Inhalation every 4 hours PRN Shortness of Breath and/or Wheezing  guaifenesin/dextromethorphan  Syrup 10 milliLiter(s) Oral every 4 hours PRN Cough      Allergies: NKDA    ROS:  SEE BELOW        ICU Vital Signs Last 24 Hrs  T(C): 38.6 (18 Dec 2020 05:45), Max: 39.3 (17 Dec 2020 09:08)  T(F): 101.5 (18 Dec 2020 05:45), Max: 102.7 (17 Dec 2020 09:08)  HR: 81 (18 Dec 2020 06:10) (81 - 104)  BP: 87/41 (18 Dec 2020 06:10) (77/38 - 148/66)  BP(mean): 51 (18 Dec 2020 06:10) (47 - 84)  ABP: 99/49 (18 Dec 2020 06:10) (85/53 - 165/81)  ABP(mean): 62 (18 Dec 2020 06:10) (62 - 110)  RR: --  SpO2: 92% (18 Dec 2020 06:10) (85% - 98%)      Mode: AC/ CMV (Assist Control/ Continuous Mandatory Ventilation)  RR (machine): 28  TV (machine): 350  FiO2: 65  PEEP: 16  ITime: 1  MAP: 23  PIP: 35      I&O's Summary    17 Dec 2020 07:01  -  18 Dec 2020 07:00  --------------------------------------------------------  IN: 2091.4 mL / OUT: 827 mL / NET: 1264.4 mL        Physical Exam:  SEE BELOW                          11.8   9.47  )-----------( 324      ( 18 Dec 2020 05:45 )             38.6       12-18    138  |  102  |  53<H>  ----------------------------<  275<H>  4.3   |  29  |  0.98    Ca    8.2<L>      18 Dec 2020 05:45  Phos  4.5     12-18  Mg     2.9     12-18    TPro  6.2  /  Alb  1.6<L>  /  TBili  0.5  /  DBili  0.3<H>  /  AST  67<H>  /  ALT  66  /  AlkPhos  125<H>  12-18            ABG - ( 18 Dec 2020 05:49 )  pH, Arterial: 7.35  pH, Blood: x     /  pCO2: 52    /  pO2: 63    / HCO3: 28    / Base Excess: 1.9   /  SaO2: 91                      DVT Prophylaxis:                                                            Contraindication:     Advanced Directives:    Discussed with:    Visit Information:  Time spent excluding procedure:  75 cc mins    ** Time is exclusive of billed procedures and/or teaching and/or routine family updates.

## 2020-12-18 NOTE — PROGRESS NOTE ADULT - SUBJECTIVE AND OBJECTIVE BOX
70y Female pmhx HTN, DM, HLD, cognitive impairment, GALE/OHS admit  with complaint of 1 week productive cough. Admitted for COVID 19+ with acute hypoxic respiratory failure. Patient respiratory status continued to decline despite NIV and intubated earlier today. Hospital course further complicated by E.coli UTI.    COVID 19 ARDS type 1 resp failure sedate paralyzed  febrile today and yesterday.     Prone ventilation started today      Hospital  12/12  CCU  12/13  Vent 12/15  L SCV CVL 12/15  A-line to R radial 12/17      Vital signs/reviewed and physical exam performed where pertinent and urgently required.    Lab/radiology studies/ABG/meds reviewed and interpreted into the assessment and treatment plan.    Assessment/Plan/Therapeutic interventions      Neuro: Sedation neuromuscular blockade as needed to facilitate safe ventilation. Propofol/cisatracurium.  Add fentanyl low dose     Cor:  Pressor support as needed to maintain MAP 65.  Avoiding fluid challenges.   BP OK on antihypertensives    Pulm:  ARDS-NET 4-6cc/kg IBW TV as able to maintain plateau pressures <30. Prone ventilation consideration as feasible.  Pa02/Fi02 < 150 on Fi02 >60% and PEEP at least 5.  Decadron 10 qd will discuss 6 mg     GI:  H2 blockers   Enteric feeds as tolerated     Renal: Even to negative fluid balance as tolerated by hemodynamics and renal fx.   lasix     Heme:  Pharmacologic DVT P in addition to SCD's in low risk,  full AC in high risk  following D- Dimer clinical course and doppler studies where appropriate.   Lovenox 40 bid      ID: ABX discontinuation based on discussion with ID in conjunction with clinical features, culture data, and judicious procalcitonin monitoring.   CTX stopped  e coli UTI.  CON staph   still febrile related to COVID.      Endo Aggressive glycemic control to limit FS glucose to < 180mg/dl.  May need to start insulin infusion.          COVID 19 specific considerations and therapeutic options based on the available and rapidly changing literature    Goals of care considerations:  Ongoing assessment for patient specific treatment options based on progression or decline.  I have involved the family with updates and requests in guidance for medical decision making.      39 minutes of critical care time spent in the management of this critically ill COVID-19 patient with continuous assessments and interventions based on the interpretation of multiple databases.      Assessment and Plan:    Nutritional Assessment:  · Nutritional Assessment	This patient has been assessed with a concern for Malnutrition and has been determined to have a diagnosis/diagnoses of Severe protein-calorie malnutrition.    This patient is being managed with:   Diet NPO with Tube Feed-  Tube Feeding Modality: Orogastric  Glucerna 1.5 [place] (GLUCERNA1.5)  Total Volume for 24 Hours (mL): 840  Continuous  Starting Tube Feed Rate {mL per Hour}: 35  Until Goal Tube Feed Rate (mL per Hour): 35  Tube Feed Duration (in Hours): 24  Tube Feed Start Time: 00:00  No Carb Prosource TF     Qty per Day:  4  Entered: Dec 15 2020 10:07PM

## 2020-12-18 NOTE — PROGRESS NOTE ADULT - SUBJECTIVE AND OBJECTIVE BOX
Date of service: 20 @ 12:08    pt seen and examined  intubated 12/15 d/t worsening respiratory failure  on ventilatory support; Fio2 65, PEEP 16  noted with persistent fevers 101-102    ROS: unable to obtain d/t medical condition    MEDICATIONS  (STANDING):  aspirin  chewable 81 milliGRAM(s) Oral daily  chlorhexidine 0.12% Liquid 15 milliLiter(s) Oral Mucosa every 12 hours  dexAMETHasone  IVPB 10 milliGRAM(s) IV Intermittent daily  dextrose 40% Gel 15 Gram(s) Oral once  dextrose 5%. 1000 milliLiter(s) (50 mL/Hr) IV Continuous <Continuous>  dextrose 50% Injectable 25 Gram(s) IV Push once  enoxaparin Injectable 40 milliGRAM(s) SubCutaneous every 12 hours  famotidine    Tablet 20 milliGRAM(s) Oral two times a day  fentaNYL   Infusion... 0.5 MICROgram(s)/kG/Hr (2.27 mL/Hr) IV Continuous <Continuous>  glucagon  Injectable 1 milliGRAM(s) IntraMuscular once  insulin glargine Injectable (LANTUS) 40 Unit(s) SubCutaneous two times a day  insulin lispro (ADMELOG) corrective regimen sliding scale   SubCutaneous every 6 hours  lamoTRIgine 100 milliGRAM(s) Oral two times a day  norepinephrine Infusion 0.05 MICROgram(s)/kG/Min (4.25 mL/Hr) IV Continuous <Continuous>  PARoxetine 20 milliGRAM(s) Oral daily  polyethylene glycol 3350 17 Gram(s) Oral daily  primidone 250 milliGRAM(s) Oral daily  primidone 50 milliGRAM(s) Oral daily  primidone 150 milliGRAM(s) Oral at bedtime  propofol Infusion 30 MICROgram(s)/kG/Min (16.3 mL/Hr) IV Continuous <Continuous>  remdesivir  IVPB 100 milliGRAM(s) IV Intermittent every 24 hours  simvastatin 40 milliGRAM(s) Oral at bedtime    Vital Signs Last 24 Hrs  T(C): 38.4 (18 Dec 2020 10:06), Max: 38.8 (17 Dec 2020 21:14)  T(F): 101.2 (18 Dec 2020 10:06), Max: 101.9 (17 Dec 2020 21:14)  HR: 76 (18 Dec 2020 10:00) (76 - 104)  BP: 113/48 (18 Dec 2020 10:00) (81/47 - 148/66)  BP(mean): 63 (18 Dec 2020 10:00) (51 - 84)  RR: --  SpO2: 97% (18 Dec 2020 10:00) (66% - 97%)      PE:  Constitutional: frail looking, intubated  HEENT: NC/AT, EOMI, PERRLA, conjunctivae clear; ears and nose atraumatic; pharynx benign  Neck: supple; thyroid not palpable  Back: no tenderness  Respiratory: decreased breath sounds, crackles   Cardiovascular: S1S2 regular, no murmurs  Abdomen: soft, not tender, not distended, positive BS; liver and spleen WNL  Genitourinary: no suprapubic tenderness  Lymphatic: no LN palpable  Musculoskeletal: no muscle tenderness, no joint swelling or tenderness  Extremities: no pedal edema  Neurological/ Psychiatric:  sedated/intubated  Skin: no rashes; no palpable lesions    Labs: all available labs reviewed                                   11.8   9.47  )-----------( 324      ( 18 Dec 2020 05:45 )             38.6         138  |  102  |  53<H>  ----------------------------<  275<H>  4.3   |  29  |  0.98    Ca    8.2<L>      18 Dec 2020 05:45  Phos  4.5       Mg     2.9         TPro  6.2  /  Alb  1.6<L>  /  TBili  0.5  /  DBili  0.3<H>  /  AST  67<H>  /  ALT  66  /  AlkPhos  125<H>          Ferritin, Serum: 521 ng/mL (20 @ 13:43)  C-Reactive Protein, Serum: 15.70 mg/dL (20 @ 09:21)  D-Dimer Assay, Quantitative: 303 ng/mL DDU (20 @ 09:21)  Ferritin, Serum: 529 ng/mL (20 @ 09:21     Urinalysis Basic - ( 12 Dec 2020 09:45 )    Color: Yellow / Appearance: very cloudy / S.025 / pH: x  Gluc: x / Ketone: Large  / Bili: Negative / Urobili: 1 mg/dL   Blood: x / Protein: 500 mg/dL / Nitrite: Positive   Leuk Esterase: Small / RBC: >50 /HPF / WBC 26-50   Sq Epi: x / Non Sq Epi: Moderate / Bacteria: Many    Culture - Urine (20 @ 09:45)   - Amikacin: S <=16   - Amoxicillin/Clavulanic Acid: S <=8/4   - Ampicillin: S <=8 These ampicillin results predict results for amoxicillin   - Ampicillin/Sulbactam: S <=4/2 Enterobacter, Citrobacter, and Serratia may develop resistance during prolonged therapy (3-4 days)   - Aztreonam: S <=4   - Cefazolin: S <=2 (MIC_CL_COM_ENTERIC_CEFAZU) For uncomplicated UTI with K. pneumoniae, E. coli, or P. mirablis: SANCHO <=16 is sensitive and SANCHO >=32 is resistant. This also predicts results for oral agents cefaclor, cefdinir, cefpodoxime, cefprozil, cefuroxime axetil, cephalexin and locarbef for uncomplicated UTI. Note that some isolates may be susceptible to these agents while testing resistant to cefazolin.   - Cefepime: S <=2   - Cefoxitin: S <=8   - Ceftriaxone: S <=1 Enterobacter, Citrobacter, and Serratia may develop resistance during prolonged therapy   - Ciprofloxacin: S <=0.25   - Ertapenem: S <=0.5   - Gentamicin: S <=2   - Imipenem: S <=1   - Levofloxacin: S <=0.5   - Meropenem: S <=1   - Nitrofurantoin: S <=32 Should not be used to treat pyelonephritis   - Piperacillin/Tazobactam: S <=8   - Tigecycline: S <=2   - Tobramycin: S <=2   - Trimethoprim/Sulfamethoxazole: S <=0.5/9.5   Specimen Source: .Urine Clean Catch (Midstream)   Culture Results:   >100,000 CFU/ml Escherichia coli   Culture - Blood (20 @ 09:21)   - Coagulase negative Staphylococcus: Detec   Gram Stain:   Growth in aerobic bottle: Gram positive cocci in pairs   Specimen Source: .Blood Blood-Peripheral   Organism: Blood Culture PCR   Culture Results:   Growth in aerobic bottle: Gram positive cocci in pairs   "Due to technical problems, Proteus sp. will Not be reported as part of   the BCID panel until further notice"     Radiology: all available radiological tests reviewed  < from: Xray Chest 1 View- PORTABLE-Urgent (20 @ 10:28) >    EXAM:  XR CHEST PORTABLE URGENT 1V                            PROCEDURE DATE:  2020          INTERPRETATION:  AP chest on 2020 at 10:22 AM. Patient has sepsis, cough, and shortness of breath.    COMPARISON: None available.    Heart may be enlarged.    There is a slight infiltrate in the left midlung field and possibly another in the retrocardiac area.    Small infiltrate off right upper hilum is also possible.    IMPRESSION: Small infiltrates as above.        Advanced directives addressed: full resuscitation

## 2020-12-18 NOTE — PROGRESS NOTE ADULT - ASSESSMENT
71 yo obese female with pmh niddm, htn, mild cognitive impairment, ?GALE/OHS, presents with complaints of 1 week onset of productive cough, weakness, mylagia, lethargy, diarrhea and shortness of breath. Daughter and  both have covid and live with her. Daughter is currently hospitalized for covid pna. Pt is lethargic but arousable on command. Requiring 4-5 L NC saturating 94%. Tachypneic but not using accessory muscles. History limited due to condition. Vital stable. CXR c/w bilateral patchy infiltrates. Elevated inflammatory markers. Here UA positive, concern for UTI, covid-19 pcr positive, was given IV cefepime/decadron/remdesivir, tx to CCU, hypoxic on NC, now on high flow o2.    1. acute respiratory failure. covid-19 viral syndrome. multifocal pneumonia. UTI  - on IV rocephin 1gm daily #5 s/p cefepime #1 for UTI  - urine cx growing Ecoli, sensitivities reviewed  blood cx staph hominis CONS - c/w contaminant  - on remdesivir #6 monitor renal/hepatic function closely on therapy  - on decadron 6mg daily #7  - continue with antiviral, steroids  - 5 day course abx   - isolation precautions  - fu cbc  - supportive care  - tolerating abx well so far; no side effects noted  - reason for abx use and side effects reviewed with patient  - supportive care    2. other issues - care per medicine  69 yo obese female with pmh niddm, htn, mild cognitive impairment, ?GALE/OHS, presents with complaints of 1 week onset of productive cough, weakness, mylagia, lethargy, diarrhea and shortness of breath. Daughter and  both have covid and live with her. Daughter is currently hospitalized for covid pna. Pt is lethargic but arousable on command. Requiring 4-5 L NC saturating 94%. Tachypneic but not using accessory muscles. History limited due to condition. Vital stable. CXR c/w bilateral patchy infiltrates. Elevated inflammatory markers. Here UA positive, concern for UTI, covid-19 pcr positive, was given IV cefepime/decadron/remdesivir, tx to CCU, hypoxic on NC, now on high flow o2.    1. acute respiratory failure. covid-19 viral syndrome. multifocal pneumonia. Ecoli UTI s/p abx course  - on IV rocephin 1gm daily #5 s/p cefepime #1 for UTI  - urine cx growing Ecoli, sensitivities reviewed  blood cx 12/12 staph hominis CONS - c/w contaminant  - on remdesivir #6 monitor renal/hepatic function closely on therapy  - on decadron 6mg daily #7  - continue with antiviral, steroids  - 5 day course abx   - isolation precautions  - fu cbc  - supportive care  - tolerating abx well so far; no side effects noted  - reason for abx use and side effects reviewed with patient  - supportive care    2. other issues - care per medicine  69 yo obese female with pmh niddm, htn, mild cognitive impairment, ?GALE/OHS, presents with complaints of 1 week onset of productive cough, weakness, mylagia, lethargy, diarrhea and shortness of breath. Daughter and  both have covid and live with her. Daughter is currently hospitalized for covid pna. Pt is lethargic but arousable on command. Requiring 4-5 L NC saturating 94%. Tachypneic but not using accessory muscles. History limited due to condition. Vital stable. CXR c/w bilateral patchy infiltrates. Elevated inflammatory markers. Here UA positive, concern for UTI, covid-19 pcr positive, was given IV cefepime/decadron/remdesivir, tx to CCU, hypoxic on NC, now on high flow o2.    1. acute respiratory failure. covid-19 viral syndrome. multifocal pneumonia. Ecoli UTI s/p abx course  - fevers prob d/t viral process, remains intubated   - on remdesivir #6 monitor renal/hepatic function closely on therapy  - on decadron 6mg daily #7  - continue with antiviral, steroids  - urine cx growing Ecoli, sensitivities reviewed blood cx 12/12 staph hominis CONS - c/w contaminant  - completed 5 day course IV rocephin for Ecoli UTI --> 12/13-12/17  - isolation precautions  - central/arterial lines clean  - ferguson care   - fu cbc  - supportive care    2. other issues - care per medicine

## 2020-12-18 NOTE — PROGRESS NOTE ADULT - ASSESSMENT
IMP:    69 y/o female pt of size with HTN, DM, HL, cognitive impairment and GALE/OHS admitted with viral PNA secondary to COVID  Acute type 1 resp failure  ARDS  E. coli UTI on CTX--completed 5 days of Abx  Severe Prot Westley malnutrition     OGT placed on 12/15, tip below the diaphragm for meds/nutrition     High risk for acute decompensation and deterioration including death.  Critically ill     Plan:    Full vent support--LPV strategy to maintain plateau pressures < 30--High PEEP/low TV--Permissive hypercapnea and acidemia. Prone today.  Check ABG while prone  Prop gtt.  Stop Cis gtt  TF   HOB > 30  Bowel regime  Cont with Rem/Deca (6)  Cont with CTX (5)--stop after today dose  Increase lantus to 30 bid  FS with insulin coverage--keep FS < 180.  Increase Lantus to 40 bid  DVT prophy--SCD and LMWH bid    CCU care-- d/w ICU staff on multi disciplinary rounds and pt-- All concerns addressed including but not limited to diagnosis, treatment plan and overall prognosis

## 2020-12-19 NOTE — PROGRESS NOTE ADULT - SUBJECTIVE AND OBJECTIVE BOX
Hospital # 7  CCU # 6  Vent # 4  L SCV CVL # 4  R fem A-line # 2--REMOVED.  R Rad A-line # 2  Cis # 3 (Stopped)    CC:  COVID    HPI:    71 y/o female pt of size with HTN, DM, HL, cognitive impairment and GALE/OHS presents with complaints of 1 week onset of productive cough, weakness, myalgia, lethargy, diarrhea and shortness of breath. Daughter and  both have covid and live with her. Daughter is currently hospitalized for covid pna    12/14:  Pt seen and examined in CCU--on BIPAP--O2 sat high 80s--Awake--increased WOB--able to speak in full sentences.  FS not well controlled.     12/15:  Cannot tolerate being off BIPAP.  Not eating.  Awake and alert.  Mod WOB.  O2 sat mid 80s    12/16:  Above noted.  emergently intubated yesterday.  On prop and cis gtts. No pressors.  Proned.  Ppressure 34    12/17:  Vented--Ppressure > 30.  On prop and cis gtts.  Tm 102.7  FS still high     12/18:  Vented--sedated.  65%/+ 16.  Meets criteria for prone    12/19:  Prone--did not really response to maneuver.  100%/+ 18.  On pressors--Norepi 0.08.  Cr rising.  Not doing well    PMH:  As above.     PSH:  As above.     FH: Non Contributory other than those listed in HPI    Social History:  Unobtainable due to clinical condition     MEDICATIONS  (STANDING):  aspirin  chewable 81 milliGRAM(s) Oral daily  chlorhexidine 0.12% Liquid 15 milliLiter(s) Oral Mucosa every 12 hours  dexAMETHasone  IVPB 10 milliGRAM(s) IV Intermittent daily  dextrose 40% Gel 15 Gram(s) Oral once  dextrose 5%. 1000 milliLiter(s) (50 mL/Hr) IV Continuous <Continuous>  dextrose 50% Injectable 25 Gram(s) IV Push once  enoxaparin Injectable 40 milliGRAM(s) SubCutaneous every 12 hours  famotidine    Tablet 20 milliGRAM(s) Oral two times a day  fentaNYL   Infusion... 0.5 MICROgram(s)/kG/Hr (2.27 mL/Hr) IV Continuous <Continuous>  glucagon  Injectable 1 milliGRAM(s) IntraMuscular once  insulin glargine Injectable (LANTUS) 40 Unit(s) SubCutaneous two times a day  insulin lispro (ADMELOG) corrective regimen sliding scale   SubCutaneous every 6 hours  lamoTRIgine 100 milliGRAM(s) Oral two times a day  norepinephrine Infusion 0.05 MICROgram(s)/kG/Min (4.25 mL/Hr) IV Continuous <Continuous>  PARoxetine 20 milliGRAM(s) Oral daily  polyethylene glycol 3350 17 Gram(s) Oral daily  primidone 150 milliGRAM(s) Oral at bedtime  primidone 250 milliGRAM(s) Oral daily  primidone 50 milliGRAM(s) Oral daily  propofol Infusion 30 MICROgram(s)/kG/Min (16.3 mL/Hr) IV Continuous <Continuous>  simvastatin 40 milliGRAM(s) Oral at bedtime    MEDICATIONS  (PRN):  acetaminophen   Tablet .. 650 milliGRAM(s) Oral every 4 hours PRN Temp greater or equal to 38.5C (101.3F)  ALBUTerol    90 MICROgram(s) HFA Inhaler 2 Puff(s) Inhalation every 4 hours PRN Shortness of Breath and/or Wheezing  guaifenesin/dextromethorphan  Syrup 10 milliLiter(s) Oral every 4 hours PRN Cough  LORazepam   Injectable 2 milliGRAM(s) IV Push every 4 hours PRN Agitation      Allergies: NKDA    ROS:  SEE BELOW        ICU Vital Signs Last 24 Hrs  T(C): 37.9 (19 Dec 2020 09:00), Max: 39.6 (19 Dec 2020 07:00)  T(F): 100.2 (19 Dec 2020 09:00), Max: 103.2 (19 Dec 2020 07:00)  HR: 80 (19 Dec 2020 09:00) (65 - 98)  BP: 102/45 (19 Dec 2020 09:00) (84/36 - 129/47)  BP(mean): 58 (19 Dec 2020 09:00) (47 - 67)  ABP: 107/49 (19 Dec 2020 09:00) (78/45 - 143/61)  ABP(mean): 65 (19 Dec 2020 09:00) (55 - 86)  RR: 28 (19 Dec 2020 09:00) (27 - 28)  SpO2: 92% (19 Dec 2020 09:00) (68% - 100%)      Mode: AC/ CMV (Assist Control/ Continuous Mandatory Ventilation)  RR (machine): 28  TV (machine): 350  FiO2: 100  PEEP: 18  ITime: 1  MAP: 25  PIP: 38      I&O's Summary    18 Dec 2020 07:01  -  19 Dec 2020 07:00  --------------------------------------------------------  IN: 2779.4 mL / OUT: 400 mL / NET: 2379.4 mL        Physical Exam:  SEE BELOW                          12.2   12.80 )-----------( 377      ( 19 Dec 2020 06:00 )             39.8       12-19    136  |  100  |  82<H>  ----------------------------<  123<H>  4.9   |  29  |  1.89<H>    Ca    8.4<L>      19 Dec 2020 06:00  Phos  6.9     12-19  Mg     3.1     12-19    TPro  6.8  /  Alb  1.5<L>  /  TBili  0.7  /  DBili  0.6<H>  /  AST  71<H>  /  ALT  69  /  AlkPhos  119  12-19            ABG - ( 19 Dec 2020 05:34 )  pH, Arterial: 7.24  pH, Blood: x     /  pCO2: 67    /  pO2: 55    / HCO3: 28    / Base Excess: -.6   /  SaO2: 84                      DVT Prophylaxis:                                                            Contraindication:     Advanced Directives:    Discussed with:    Visit Information:  Time spent excluding procedure:  75 cc mins    ** Time is exclusive of billed procedures and/or teaching and/or routine family updates.

## 2020-12-19 NOTE — PROGRESS NOTE ADULT - SUBJECTIVE AND OBJECTIVE BOX
Date of service: 20 @ 11:47    Events noted  On ventilatory support  Reported with worsening creatinine  Febrile to 103F    ROS: unobtainable    MEDICATIONS  (STANDING):  aspirin  chewable 81 milliGRAM(s) Oral daily  chlorhexidine 0.12% Liquid 15 milliLiter(s) Oral Mucosa every 12 hours  dexAMETHasone  IVPB 10 milliGRAM(s) IV Intermittent daily  dextrose 40% Gel 15 Gram(s) Oral once  dextrose 5%. 1000 milliLiter(s) (50 mL/Hr) IV Continuous <Continuous>  dextrose 50% Injectable 25 Gram(s) IV Push once  famotidine    Tablet 20 milliGRAM(s) Oral two times a day  fentaNYL   Infusion... 0.5 MICROgram(s)/kG/Hr (2.27 mL/Hr) IV Continuous <Continuous>  glucagon  Injectable 1 milliGRAM(s) IntraMuscular once  heparin   Injectable 7500 Unit(s) SubCutaneous every 8 hours  insulin glargine Injectable (LANTUS) 40 Unit(s) SubCutaneous two times a day  insulin lispro (ADMELOG) corrective regimen sliding scale   SubCutaneous every 6 hours  lamoTRIgine 100 milliGRAM(s) Oral two times a day  norepinephrine Infusion 0.05 MICROgram(s)/kG/Min (4.25 mL/Hr) IV Continuous <Continuous>  PARoxetine 20 milliGRAM(s) Oral daily  polyethylene glycol 3350 17 Gram(s) Oral daily  primidone 250 milliGRAM(s) Oral daily  primidone 50 milliGRAM(s) Oral daily  primidone 150 milliGRAM(s) Oral at bedtime  propofol Infusion 30 MICROgram(s)/kG/Min (16.3 mL/Hr) IV Continuous <Continuous>  simvastatin 40 milliGRAM(s) Oral at bedtime    Vital Signs Last 24 Hrs  T(C): 37.9 (19 Dec 2020 09:00), Max: 39.6 (19 Dec 2020 07:00)  T(F): 100.2 (19 Dec 2020 09:00), Max: 103.2 (19 Dec 2020 07:00)  HR: 74 (19 Dec 2020 10:00) (65 - 98)  BP: 90/34 (19 Dec 2020 10:00) (84/36 - 129/47)  BP(mean): 48 (19 Dec 2020 10:00) (47 - 67)  RR: 28 (19 Dec 2020 10:00) (27 - 28)  SpO2: 95% (19 Dec 2020 10:00) (68% - 100%)  Mode: AC/ CMV (Assist Control/ Continuous Mandatory Ventilation), RR (machine): 28, TV (machine): 350, FiO2: 100, PEEP: 18, ITime: 1, MAP: 24, PIP: 38   Physical exam:    Constitutional: frail looking, intubated  HEENT: NC/AT, EOMI, PERRLA, conjunctivae clear; ears and nose atraumatic; pharynx benign  Neck: supple; thyroid not palpable  Back: no tenderness  Respiratory: decreased breath sounds, crackles   Cardiovascular: S1S2 regular, no murmurs  Abdomen: soft, not tender, not distended, positive BS; liver and spleen WNL  Genitourinary: no suprapubic tenderness  Lymphatic: no LN palpable  Musculoskeletal: no muscle tenderness, no joint swelling or tenderness  Extremities: no pedal edema  Neurological/ Psychiatric:  sedated/intubated  Skin: no rashes; no palpable lesions    Labs: reviewed                        12.2   12.80 )-----------( 377      ( 19 Dec 2020 06:00 )             39.8         136  |  100  |  82<H>  ----------------------------<  123<H>  4.9   |  29  |  1.89<H>    Ca    8.4<L>      19 Dec 2020 06:00  Phos  6.9       Mg     3.1         TPro  6.8  /  Alb  1.5<L>  /  TBili  0.7  /  DBili  0.6<H>  /  AST  71<H>  /  ALT  69  /  AlkPhos  119      Ferritin, Serum: 521 ng/mL (20 @ 13:43)  C-Reactive Protein, Serum: 15.70 mg/dL (20 @ 09:21)  D-Dimer Assay, Quantitative: 303 ng/mL DDU (20 @ 09:21)  Ferritin, Serum: 529 ng/mL (20 @ 09:21)                                   11.8   9.47  )-----------( 324      ( 18 Dec 2020 05:45 )             38.6         138  |  102  |  53<H>  ----------------------------<  275<H>  4.3   |  29  |  0.98    Ca    8.2<L>      18 Dec 2020 05:45  Phos  4.5       Mg     2.9         TPro  6.2  /  Alb  1.6<L>  /  TBili  0.5  /  DBili  0.3<H>  /  AST  67<H>  /  ALT  66  /  AlkPhos  125<H>        Ferritin, Serum: 521 ng/mL (20 @ 13:43)  C-Reactive Protein, Serum: 15.70 mg/dL (20 @ 09:21)  D-Dimer Assay, Quantitative: 303 ng/mL DDU (20 @ 09:21)  Ferritin, Serum: 529 ng/mL (20 @ 09:21     Urinalysis Basic - ( 12 Dec 2020 09:45 )    Color: Yellow / Appearance: very cloudy / S.025 / pH: x  Gluc: x / Ketone: Large  / Bili: Negative / Urobili: 1 mg/dL   Blood: x / Protein: 500 mg/dL / Nitrite: Positive   Leuk Esterase: Small / RBC: >50 /HPF / WBC 26-50   Sq Epi: x / Non Sq Epi: Moderate / Bacteria: Many      Culture - Urine (collected 12 Dec 2020 09:45)  Source: .Urine Clean Catch (Midstream)  Final Report (14 Dec 2020 15:04):    >100,000 CFU/ml Escherichia coli  Organism: Escherichia coli (14 Dec 2020 15:04)  Organism: Escherichia coli (14 Dec 2020 15:04)      -  Amikacin: S <=16      -  Amoxicillin/Clavulanic Acid: S <=8/4      -  Ampicillin: S <=8 These ampicillin results predict results for amoxicillin      -  Ampicillin/Sulbactam: S <=4/2 Enterobacter, Citrobacter, and Serratia may develop resistance during prolonged therapy (3-4 days)      -  Aztreonam: S <=4      -  Cefazolin: S <=2 ([id number]_[id number]) For uncomplicated UTI with K. pneumoniae, E. coli, or P. mirablis: SANCHO <=16 is sensitive and SANCHO >=32 is resistant. This also predicts results for oral agents cefaclor, cefdinir, cefpodoxime, cefprozil, cefuroxime axetil, cephalexin and locarbef for uncomplicated UTI. Note that some isolates may be susceptible to these agents while testing resistant to cefazolin.      -  Cefepime: S <=2      -  Cefoxitin: S <=8      -  Ceftriaxone: S <=1 Enterobacter, Citrobacter, and Serratia may develop resistance during prolonged therapy      -  Ciprofloxacin: S <=0.25      -  Ertapenem: S <=0.5      -  Gentamicin: S <=2      -  Imipenem: S <=1      -  Levofloxacin: S <=0.5      -  Meropenem: S <=1      -  Nitrofurantoin: S <=32 Should not be used to treat pyelonephritis      -  Piperacillin/Tazobactam: S <=8      -  Tigecycline: S <=2      -  Tobramycin: S <=2      -  Trimethoprim/Sulfamethoxazole: S <=0.5/9.5      Method Type: SANCHO    Culture - Blood (collected 12 Dec 2020 09:21)  Source: .Blood Blood-Peripheral  Gram Stain (13 Dec 2020 04:42):    Growth in aerobic bottle: Gram positive cocci in pairs  Final Report (14 Dec 2020 08:53):    Growth in aerobic bottle: Staphylococcus hominis    Coag Negative Staphylococcus  Organism: Blood Culture PCR (14 Dec 2020 08:53)      -  Coagulase negative Staphylococcus: Detec      Method Type: PCR    Culture - Blood (collected 12 Dec 2020 09:21)  Source: .Blood Blood-Peripheral  Gram Stain (13 Dec 2020 19:38):    Growth in anaerobic bottle: Gram Positive Cocci in Clusters  Final Report (15 Dec 2020 21:13):    Growth in anaerobic bottle: Staphylococcus hominis  Organism: Staphylococcus hominis (15 Dec 2020 21:13)  Organism: Staphylococcus hominis (15 Dec 2020 21:13)      -  Ampicillin/Sulbactam: S <=8/4      -  Cefazolin: S <=4      -  Clindamycin: S <=0.25      -  Erythromycin: R >4      -  Gentamicin: S <=1 Should not be used as monotherapy      -  Oxacillin: S <=0.25      -  RIF- Rifampin: S <=1 Should not be used as monotherapy      -  Tetra/Doxy: S <=1      -  Trimethoprim/Sulfamethoxazole: R >2/38      -  Vancomycin: S 1      Method Type: SANCHO      Radiology: all available radiological tests reviewed  < from: Xray Chest 1 View- PORTABLE-Urgent (20 @ 10:28) >    EXAM:  XR CHEST PORTABLE URGENT 1V                            PROCEDURE DATE:  2020          INTERPRETATION:  AP chest on 2020 at 10:22 AM. Patient has sepsis, cough, and shortness of breath.    COMPARISON: None available.    Heart may be enlarged.    There is a slight infiltrate in the left midlung field and possibly another in the retrocardiac area.    Small infiltrate off right upper hilum is also possible.    IMPRESSION: Small infiltrates as above.        Advanced directives addressed: full resuscitation

## 2020-12-19 NOTE — PROGRESS NOTE ADULT - ASSESSMENT
IMP:    69 y/o female pt of size with HTN, DM, HL, cognitive impairment and GALE/OHS admitted with viral PNA secondary to COVID  Acute type 1 resp failure  ARDS  STAS (KDIGO 1) related to ATN--baseline Cr < 1.0  No emergent indication for RRT  E. coli UTI on CTX--completed 5 days of Abx  Severe Prot Westley malnutrition     OGT placed on 12/15, tip below the diaphragm for meds/nutrition     High risk for acute decompensation and deterioration including death.  Critically ill     Plan:    Full vent support--LPV strategy to maintain plateau pressures < 30--High PEEP/low TV--Permissive hypercapnea and acidemia. No prone--not responsive  Prop and fent gtt.  Stopped Cis gtt  Actively titrate pressors to keep MAP > 60  TF   HOB > 30  Bowel regime  Cont with Rem/Deca (6)--Will stop Rem given STAS--will cont with deca  Cont with CTX (5)--stop after today dose  Increase lantus to 30 bid  FS with insulin coverage--keep FS < 180.  Increased Lantus to 40 bid  DVT prophy--SCD and stop LMWH--start sqhep 7500 q 8    CCU care-- d/w ICU staff on multi disciplinary rounds  IMP:    69 y/o female pt of size with HTN, DM, HL, cognitive impairment and GALE/OHS admitted with viral PNA secondary to COVID  Acute type 1 resp failure  ARDS  Septic shock  STAS (KDIGO 1) related to ATN--baseline Cr < 1.0  No emergent indication for RRT  E. coli UTI on CTX--completed 5 days of Abx  Severe Prot Westley malnutrition     OGT placed on 12/15, tip below the diaphragm for meds/nutrition     High risk for acute decompensation and deterioration including death.  Critically ill     Plan:    Full vent support--LPV strategy to maintain plateau pressures < 30--High PEEP/low TV--Permissive hypercapnea and acidemia. No prone--not responsive  Prop and fent gtt.  Stopped Cis gtt  Actively titrate pressors to keep MAP > 60  TF   HOB > 30  Bowel regime  Cont with Rem/Deca (6)--Will stop Rem given STAS--will cont with deca  Cont with CTX (5)--stop after today dose  Increase lantus to 30 bid  FS with insulin coverage--keep FS < 180.  Increased Lantus to 40 bid  DVT prophy--SCD and stop LMWH--start sqhep 7500 q 8    CCU care-- d/w ICU staff on multi disciplinary rounds

## 2020-12-19 NOTE — PROGRESS NOTE ADULT - ASSESSMENT
71 yo obese female with pmh niddm, htn, mild cognitive impairment, ?GALE/OHS, presents with complaints of 1 week onset of productive cough, weakness, mylagia, lethargy, diarrhea and shortness of breath. Daughter and  both have covid and live with her. Daughter is currently hospitalized for covid pna. Pt is lethargic but arousable on command. Requiring 4-5 L NC saturating 94%. Tachypneic but not using accessory muscles. History limited due to condition. Vital stable. CXR c/w bilateral patchy infiltrates. Elevated inflammatory markers. Here UA positive, concern for UTI, covid-19 pcr positive, was given IV cefepime/decadron/remdesivir, tx to CCU, hypoxic on NC, now on high flow o2.    1. Febrile syndrome. Acute respiratory failure. covid-19 viral syndrome. multifocal pneumonia. ARF. s/p Ecoli UTI  -worsening fever  -worsening renal function  -concerned about bacterial superimposed infection  - on remdesivir # 7  - on decadron 6mg daily # 8  - d/c remdesivir  -obtain CXR  -obtain inflammatory markers  - recent blood cx 12/12 staph hominis CONS - c/w contaminant  - completed 5 day course IV rocephin for Ecoli UTI --> 12/13-12/17  -start meropenem 1 gm IV q12h  -reason for abx use and side effects reviewed with patient; monitor BMP   - isolation precautions  -consider tappering steroids  - central/arterial lines clean  - ferguson care   - fu cbc  - supportive care    2. other issues - care per medicine

## 2020-12-20 NOTE — PROGRESS NOTE ADULT - SUBJECTIVE AND OBJECTIVE BOX
Hospital # 8  CCU # 7  Vent # 5  L SCV CVL # 5  R fem A-line # 2--REMOVED.  R Rad A-line # 3  Cis # 3 (Stopped)    CC:  COVID    HPI:    71 y/o female pt of size with HTN, DM, HL, cognitive impairment and GALE/OHS presents with complaints of 1 week onset of productive cough, weakness, myalgia, lethargy, diarrhea and shortness of breath. Daughter and  both have covid and live with her. Daughter is currently hospitalized for covid pna    12/14:  Pt seen and examined in CCU--on BIPAP--O2 sat high 80s--Awake--increased WOB--able to speak in full sentences.  FS not well controlled.     12/15:  Cannot tolerate being off BIPAP.  Not eating.  Awake and alert.  Mod WOB.  O2 sat mid 80s    12/16:  Above noted.  emergently intubated yesterday.  On prop and cis gtts. No pressors.  Proned.  Ppressure 34    12/17:  Vented--Ppressure > 30.  On prop and cis gtts.  Tm 102.7  FS still high     12/18:  Vented--sedated.  65%/+ 16.  Meets criteria for prone    12/19:  Prone--did not really response to maneuver.  100%/+ 18.  On pressors--Norepi 0.08.  Cr rising.  Not doing well    12/20:  Not doing well.  Worsening renal failure.  on 100% + 18.  Pp 32. Tm 103.2  BM few days ago.  On pressors    PMH:  As above.     PSH:  As above.     FH: Non Contributory other than those listed in HPI    Social History:  Unobtainable due to clinical condition     MEDICATIONS  (STANDING):  aspirin  chewable 81 milliGRAM(s) Oral daily  chlorhexidine 0.12% Liquid 15 milliLiter(s) Oral Mucosa every 12 hours  dexAMETHasone  IVPB 8 milliGRAM(s) IV Intermittent daily  dextrose 40% Gel 15 Gram(s) Oral once  dextrose 5%. 1000 milliLiter(s) (50 mL/Hr) IV Continuous <Continuous>  dextrose 50% Injectable 25 Gram(s) IV Push once  famotidine    Tablet 20 milliGRAM(s) Oral two times a day  fentaNYL   Infusion... 0.5 MICROgram(s)/kG/Hr (2.27 mL/Hr) IV Continuous <Continuous>  glucagon  Injectable 1 milliGRAM(s) IntraMuscular once  heparin   Injectable 7500 Unit(s) SubCutaneous every 8 hours  insulin glargine Injectable (LANTUS) 40 Unit(s) SubCutaneous two times a day  insulin lispro (ADMELOG) corrective regimen sliding scale   SubCutaneous every 6 hours  lamoTRIgine 100 milliGRAM(s) Oral two times a day  meropenem  IVPB 1000 milliGRAM(s) IV Intermittent every 12 hours  norepinephrine Infusion 0.05 MICROgram(s)/kG/Min (4.25 mL/Hr) IV Continuous <Continuous>  PARoxetine 20 milliGRAM(s) Oral daily  polyethylene glycol 3350 17 Gram(s) Oral daily  primidone 50 milliGRAM(s) Oral daily  primidone 150 milliGRAM(s) Oral at bedtime  primidone 250 milliGRAM(s) Oral daily  propofol Infusion 30 MICROgram(s)/kG/Min (16.3 mL/Hr) IV Continuous <Continuous>  senna 2 Tablet(s) Oral at bedtime  simvastatin 40 milliGRAM(s) Oral at bedtime    MEDICATIONS  (PRN):  acetaminophen   Tablet .. 650 milliGRAM(s) Oral every 4 hours PRN Temp greater or equal to 38.5C (101.3F)  ALBUTerol    90 MICROgram(s) HFA Inhaler 2 Puff(s) Inhalation every 4 hours PRN Shortness of Breath and/or Wheezing  guaifenesin/dextromethorphan  Syrup 10 milliLiter(s) Oral every 4 hours PRN Cough  LORazepam   Injectable 2 milliGRAM(s) IV Push every 4 hours PRN Agitation      Allergies: NKDA    ROS:  SEE BELOW        ICU Vital Signs Last 24 Hrs  T(C): 38.1 (20 Dec 2020 04:42), Max: 38.2 (20 Dec 2020 02:33)  T(F): 100.6 (20 Dec 2020 04:42), Max: 100.8 (20 Dec 2020 02:33)  HR: 93 (20 Dec 2020 06:00) (66 - 93)  BP: 119/50 (20 Dec 2020 06:00) (90/34 - 123/51)  BP(mean): 67 (20 Dec 2020 06:00) (48 - 72)  ABP: 116/50 (20 Dec 2020 06:00) (91/44 - 128/53)  ABP(mean): 69 (20 Dec 2020 06:00) (58 - 80)  RR: 25 (20 Dec 2020 06:00) (25 - 30)  SpO2: 91% (20 Dec 2020 06:00) (88% - 97%)      Mode: AC/ CMV (Assist Control/ Continuous Mandatory Ventilation)  RR (machine): 28  TV (machine): 350  FiO2: 80  PEEP: 18  ITime: 1  MAP: 25  PIP: 36      I&O's Summary    19 Dec 2020 07:01  -  20 Dec 2020 07:00  --------------------------------------------------------  IN: 3013 mL / OUT: 440 mL / NET: 2573 mL        Physical Exam:  SEE BELOW                          11.1   15.13 )-----------( 429      ( 20 Dec 2020 06:21 )             36.8       12-20    133<L>  |  96  |  97<H>  ----------------------------<  83  4.9   |  26  |  2.74<H>    Ca    8.3<L>      20 Dec 2020 06:21  Phos  6.9     12-19  Mg     3.2     12-20    TPro  6.8  /  Alb  1.5<L>  /  TBili  0.7  /  DBili  0.6<H>  /  AST  71<H>  /  ALT  69  /  AlkPhos  119  12-19            ABG - ( 20 Dec 2020 06:11 )  pH, Arterial: 7.25  pH, Blood: x     /  pCO2: 60    /  pO2: 52    / HCO3: 25    / Base Excess: -2.4  /  SaO2: 82                      DVT Prophylaxis:                                                            Contraindication:     Advanced Directives:    Discussed with:    Visit Information:  Time spent excluding procedure:  75 cc mins    ** Time is exclusive of billed procedures and/or teaching and/or routine family updates.

## 2020-12-20 NOTE — PROGRESS NOTE ADULT - ASSESSMENT
69 yo obese female with pmh niddm, htn, mild cognitive impairment, ?GALE/OHS, presents with complaints of 1 week onset of productive cough, weakness, mylagia, lethargy, diarrhea and shortness of breath. Daughter and  both have covid and live with her. Daughter is currently hospitalized for covid pna. Pt is lethargic but arousable on command. Requiring 4-5 L NC saturating 94%. Tachypneic but not using accessory muscles. History limited due to condition. Vital stable. CXR c/w bilateral patchy infiltrates. Elevated inflammatory markers. Here UA positive, concern for UTI, covid-19 pcr positive, was given IV cefepime/decadron/remdesivir, tx to CCU, hypoxic on NC, now on high flow o2.    1. Febrile syndrome. Acute respiratory failure. covid-19 viral syndrome. multifocal pneumonia. ARF. s/p Ecoli UTI  -has fever  -renal function is worsening  -concerned about bacterial superimposed infection  - s/p remdesivir # 7 days  - on decadron 6mg daily # 8  -obtain CXR  -obtain inflammatory markers  - recent blood cx 12/12 staph hominis CONS - c/w contaminant  - completed 5 day course IV rocephin for Ecoli UTI --> 12/13-12/17  -on meropenem 1 gm IV q12h # 2  -tolerating abx well so far; no side effects noted  - isolation precautions  -consider tappering steroids  - central/arterial lines clean  -continue abx coverage  - ferguson care   - fu cbc  - supportive care    2. other issues - care per medicine  71 yo obese female with pmh niddm, htn, mild cognitive impairment, ?GALE/OHS, presents with complaints of 1 week onset of productive cough, weakness, mylagia, lethargy, diarrhea and shortness of breath. Daughter and  both have covid and live with her. Daughter is currently hospitalized for covid pna. Pt is lethargic but arousable on command. Requiring 4-5 L NC saturating 94%. Tachypneic but not using accessory muscles. History limited due to condition. Vital stable. CXR c/w bilateral patchy infiltrates. Elevated inflammatory markers. Here UA positive, concern for UTI, covid-19 pcr positive, was given IV cefepime/decadron/remdesivir, tx to CCU, hypoxic on NC, now on high flow o2.    1. Febrile syndrome. Acute respiratory failure. covid-19 viral syndrome. multifocal pneumonia. ARF. s/p Ecoli UTI  -has fever  -renal function is worsening  -concerned about bacterial superimposed infection  - s/p remdesivir # 7 days  - on decadron 6mg daily # 8  -obtain CXR  -obtain inflammatory markers  - recent blood cx 12/12 staph hominis CONS - c/w contaminant  - completed 5 day course IV rocephin for Ecoli UTI --> 12/13-12/17  -on meropenem 1 gm IV q12h # 2  -tolerating abx well so far; no side effects noted  -decrease meropenem to 500 mg IV q12 h due to poor renal function  - isolation precautions  -consider tappering steroids  - central/arterial lines clean  -continue abx coverage  - ferguson care   - fu cbc  - supportive care    2. other issues - care per medicine

## 2020-12-20 NOTE — PROGRESS NOTE ADULT - ASSESSMENT
IMP:    71 y/o female pt of size with HTN, DM, HL, cognitive impairment and GALE/OHS admitted with viral PNA secondary to COVID  Acute type 1 resp failure  ARDS  Septic shock  STAS (KDIGO 2) related to ATN--baseline Cr < 1.0  No emergent indication for RRT  E. coli UTI on CTX--completed 5 days of Abx  Severe Prot Westley malnutrition     OGT placed on 12/15, tip below the diaphragm for meds/nutrition     High risk for acute decompensation and deterioration including death.  Critically ill     Plan:    Full vent support--LPV strategy to maintain plateau pressures < 30--High PEEP/low TV--Permissive hypercapnea and acidemia. No prone--not responsive  Prop and fent gtt.  Stopped Cis gtt  Will likely need RRT tomorrow--will obtain renal consult then  Actively titrate pressors to keep MAP > 60  TF   HOB > 30  Bowel regime--Add senna   Cont with Rem/Deca (6)--Will stop Rem given STAS--will taper down steroids starting today  Cont with CTX (5)--stop after today dose  Increase lantus to 30 bid  FS with insulin coverage--keep FS < 180.  Lantus to 40 bid--adjust as needed  DVT prophy--SCD and stop LMWH--start sqhep 7500 q 8    CCU care-- d/w ICU staff on multi disciplinary rounds  IMP:    71 y/o female pt of size with HTN, DM, HL, cognitive impairment and GALE/OHS admitted with viral PNA secondary to COVID  Acute type 1 resp failure  ARDS  Septic shock  STAS (KDIGO 2) related to ATN--baseline Cr < 1.0  No emergent indication for RRT  E. coli UTI on CTX--completed 5 days of Abx  Severe Prot Westley malnutrition     OGT placed on 12/15, tip below the diaphragm for meds/nutrition     High risk for acute decompensation and deterioration including death.  Critically ill     Plan:    Full vent support--LPV strategy to maintain plateau pressures < 30--High PEEP/low TV--Permissive hypercapnea and acidemia. No prone--not responsive  Prop and fent gtt.  Stopped Cis gtt  Started on Mark (2) for possible superimposed bacterial infection   Will likely need RRT tomorrow--will obtain renal consult then  Actively titrate pressors to keep MAP > 60  TF   HOB > 30  Bowel regime--Add senna   Cont with Rem/Deca (6)--Will stop Rem given STAS--will taper down steroids starting today  Cont with CTX (5)--stop after today dose  Increase lantus to 30 bid  FS with insulin coverage--keep FS < 180.  Lantus to 40 bid--adjust as needed  DVT prophy--SCD and stop LMWH--start sqhep 7500 q 8    CCU care-- d/w ICU staff on multi disciplinary rounds

## 2020-12-20 NOTE — PROGRESS NOTE ADULT - SUBJECTIVE AND OBJECTIVE BOX
Date of service: 20 @ 12:51    Intubated on ventilatory support  Sedated  Has low grade fever    ROS: unobtainable    MEDICATIONS  (STANDING):  aspirin  chewable 81 milliGRAM(s) Oral daily  chlorhexidine 0.12% Liquid 15 milliLiter(s) Oral Mucosa every 12 hours  dexAMETHasone  IVPB 8 milliGRAM(s) IV Intermittent daily  dextrose 40% Gel 15 Gram(s) Oral once  dextrose 5%. 1000 milliLiter(s) (50 mL/Hr) IV Continuous <Continuous>  dextrose 50% Injectable 25 Gram(s) IV Push once  famotidine    Tablet 20 milliGRAM(s) Oral two times a day  fentaNYL   Infusion... 0.5 MICROgram(s)/kG/Hr (2.27 mL/Hr) IV Continuous <Continuous>  glucagon  Injectable 1 milliGRAM(s) IntraMuscular once  heparin   Injectable 7500 Unit(s) SubCutaneous every 8 hours  insulin glargine Injectable (LANTUS) 40 Unit(s) SubCutaneous two times a day  insulin lispro (ADMELOG) corrective regimen sliding scale   SubCutaneous every 6 hours  lamoTRIgine 100 milliGRAM(s) Oral two times a day  meropenem  IVPB 1000 milliGRAM(s) IV Intermittent every 12 hours  norepinephrine Infusion 0.05 MICROgram(s)/kG/Min (4.25 mL/Hr) IV Continuous <Continuous>  PARoxetine 20 milliGRAM(s) Oral daily  polyethylene glycol 3350 17 Gram(s) Oral daily  primidone 250 milliGRAM(s) Oral daily  primidone 50 milliGRAM(s) Oral daily  primidone 150 milliGRAM(s) Oral at bedtime  propofol Infusion 30 MICROgram(s)/kG/Min (16.3 mL/Hr) IV Continuous <Continuous>  senna 2 Tablet(s) Oral at bedtime  simvastatin 40 milliGRAM(s) Oral at bedtime    Vital Signs Last 24 Hrs  T(C): 38.1 (20 Dec 2020 04:42), Max: 38.2 (20 Dec 2020 02:33)  T(F): 100.6 (20 Dec 2020 04:42), Max: 100.8 (20 Dec 2020 02:33)  HR: 90 (20 Dec 2020 11:00) (66 - 96)  BP: 94/49 (20 Dec 2020 11:00) (94/49 - 123/51)  BP(mean): 58 (20 Dec 2020 11:00) (53 - 72)  RR: 28 (20 Dec 2020 11:00) (25 - 33)  SpO2: 90% (20 Dec 2020 11:00) (86% - 97%)  Mode: AC/ CMV (Assist Control/ Continuous Mandatory Ventilation), RR (machine): 28, TV (machine): 350, FiO2: 100, PEEP: 18, ITime: 1, MAP: 23, PIP: 37   Physical exam:    Constitutional: frail looking, intubated  HEENT: NC/AT, EOMI, PERRLA, conjunctivae clear  Neck: supple; thyroid not palpable  Back: no tenderness  Respiratory: decreased breath sounds, crackles   Cardiovascular: S1S2 regular, no murmurs  Abdomen: soft, not tender, not distended, positive BS  Genitourinary: no suprapubic tenderness  Lymphatic: no LN palpable  Musculoskeletal: no muscle tenderness, no joint swelling or tenderness  Extremities: no pedal edema  Neurological/ Psychiatric:  sedated/intubated; confused  Skin: no rashes; no palpable lesions    Labs: reviewed                        11.1   15.13 )-----------( 429      ( 20 Dec 2020 06:21 )             36.8         133<L>  |  96  |  97<H>  ----------------------------<  83  4.9   |  26  |  2.74<H>    Ca    8.3<L>      20 Dec 2020 06:21  Phos  8.7       Mg     3.2         TPro  6.8  /  Alb  1.5<L>  /  TBili  0.7  /  DBili  0.6<H>  /  AST  71<H>  /  ALT  69  /  AlkPhos  119      D-Dimer Assay, Quantitative: 1799 ng/mL DDU (20 @ 06:21)  Ferritin, Serum: 521 ng/mL (20 @ 13:43)  C-Reactive Protein, Serum: 15.70 mg/dL (20 @ 09:21)  D-Dimer Assay, Quantitative: 303 ng/mL DDU (20 @ 09:21)  Ferritin, Serum: 529 ng/mL (20 @ 09:21)                          12.2   12.80 )-----------( 377      ( 19 Dec 2020 06:00 )             39.8         136  |  100  |  82<H>  ----------------------------<  123<H>  4.9   |  29  |  1.89<H>    Ca    8.4<L>      19 Dec 2020 06:00  Phos  6.9       Mg     3.1         TPro  6.8  /  Alb  1.5<L>  /  TBili  0.7  /  DBili  0.6<H>  /  AST  71<H>  /  ALT  69  /  AlkPhos  119      Ferritin, Serum: 521 ng/mL (20 @ 13:43)  C-Reactive Protein, Serum: 15.70 mg/dL (20 @ 09:21)  D-Dimer Assay, Quantitative: 303 ng/mL DDU (20 @ 09:21)  Ferritin, Serum: 529 ng/mL (20 @ 09:21)                                   11.8   9.47  )-----------( 324      ( 18 Dec 2020 05:45 )             38.6         138  |  102  |  53<H>  ----------------------------<  275<H>  4.3   |  29  |  0.98    Ca    8.2<L>      18 Dec 2020 05:45  Phos  4.5       Mg     2.9         TPro  6.2  /  Alb  1.6<L>  /  TBili  0.5  /  DBili  0.3<H>  /  AST  67<H>  /  ALT  66  /  AlkPhos  125<H>        Ferritin, Serum: 521 ng/mL (20 @ 13:43)  C-Reactive Protein, Serum: 15.70 mg/dL (20 @ 09:21)  D-Dimer Assay, Quantitative: 303 ng/mL DDU (20 @ 09:21)  Ferritin, Serum: 529 ng/mL (20 @ 09:21     Urinalysis Basic - ( 12 Dec 2020 09:45 )    Color: Yellow / Appearance: very cloudy / S.025 / pH: x  Gluc: x / Ketone: Large  / Bili: Negative / Urobili: 1 mg/dL   Blood: x / Protein: 500 mg/dL / Nitrite: Positive   Leuk Esterase: Small / RBC: >50 /HPF / WBC 26-50   Sq Epi: x / Non Sq Epi: Moderate / Bacteria: Many      Culture - Urine (collected 12 Dec 2020 09:45)  Source: .Urine Clean Catch (Midstream)  Final Report (14 Dec 2020 15:04):    >100,000 CFU/ml Escherichia coli  Organism: Escherichia coli (14 Dec 2020 15:04)  Organism: Escherichia coli (14 Dec 2020 15:04)      -  Amikacin: S <=16      -  Amoxicillin/Clavulanic Acid: S <=8/4      -  Ampicillin: S <=8 These ampicillin results predict results for amoxicillin      -  Ampicillin/Sulbactam: S <=4/2 Enterobacter, Citrobacter, and Serratia may develop resistance during prolonged therapy (3-4 days)      -  Aztreonam: S <=4      -  Cefazolin: S <=2 ([id number]) For uncomplicated UTI with K. pneumoniae, E. coli, or P. mirablis: SANCHO <=16 is sensitive and SANCHO >=32 is resistant. This also predicts results for oral agents cefaclor, cefdinir, cefpodoxime, cefprozil, cefuroxime axetil, cephalexin and locarbef for uncomplicated UTI. Note that some isolates may be susceptible to these agents while testing resistant to cefazolin.      -  Cefepime: S <=2      -  Cefoxitin: S <=8      -  Ceftriaxone: S <=1 Enterobacter, Citrobacter, and Serratia may develop resistance during prolonged therapy      -  Ciprofloxacin: S <=0.25      -  Ertapenem: S <=0.5      -  Gentamicin: S <=2      -  Imipenem: S <=1      -  Levofloxacin: S <=0.5      -  Meropenem: S <=1      -  Nitrofurantoin: S <=32 Should not be used to treat pyelonephritis      -  Piperacillin/Tazobactam: S <=8      -  Tigecycline: S <=2      -  Tobramycin: S <=2      -  Trimethoprim/Sulfamethoxazole: S <=0.5/9.5      Method Type: SANCHO    Culture - Blood (collected 12 Dec 2020 09:21)  Source: .Blood Blood-Peripheral  Gram Stain (13 Dec 2020 04:42):    Growth in aerobic bottle: Gram positive cocci in pairs  Final Report (14 Dec 2020 08:53):    Growth in aerobic bottle: Staphylococcus hominis    Coag Negative Staphylococcus  Organism: Blood Culture PCR (14 Dec 2020 08:53)      -  Coagulase negative Staphylococcus: Detec      Method Type: PCR    Culture - Blood (collected 12 Dec 2020 09:21)  Source: .Blood Blood-Peripheral  Gram Stain (13 Dec 2020 19:38):    Growth in anaerobic bottle: Gram Positive Cocci in Clusters  Final Report (15 Dec 2020 21:13):    Growth in anaerobic bottle: Staphylococcus hominis  Organism: Staphylococcus hominis (15 Dec 2020 21:13)  Organism: Staphylococcus hominis (15 Dec 2020 21:13)      -  Ampicillin/Sulbactam: S <=8/4      -  Cefazolin: S <=4      -  Clindamycin: S <=0.25      -  Erythromycin: R >4      -  Gentamicin: S <=1 Should not be used as monotherapy      -  Oxacillin: S <=0.25      -  RIF- Rifampin: S <=1 Should not be used as monotherapy      -  Tetra/Doxy: S <=1      -  Trimethoprim/Sulfamethoxazole: R >2/38      -  Vancomycin: S 1      Method Type: SANCHO        Radiology: all available radiological tests reviewed  < from: Xray Chest 1 View- PORTABLE-Urgent (20 @ 10:28) >    EXAM:  XR CHEST PORTABLE URGENT 1V                            PROCEDURE DATE:  2020          INTERPRETATION:  AP chest on 2020 at 10:22 AM. Patient has sepsis, cough, and shortness of breath.    COMPARISON: None available.    Heart may be enlarged.    There is a slight infiltrate in the left midlung field and possibly another in the retrocardiac area.    Small infiltrate off right upper hilum is also possible.    IMPRESSION: Small infiltrates as above.      < from: Xray Chest 1 View- PORTABLE-Routine (Xray Chest 1 View- PORTABLE-Routine .) (20 @ 12:57) >  The lungs show similar unchanged bilateral  scattered and diffuse multifocal ill-defined airspace consolidations. No pneumothorax.    < end of copied text >        Advanced directives addressed: full resuscitation

## 2020-12-20 NOTE — PROGRESS NOTE ADULT - SUBJECTIVE AND OBJECTIVE BOX
Patient is a 70y old  Female who presents with a chief complaint of sob and cough (20 Dec 2020 12:50)      BRIEF HOSPITAL COURSE: 70y Female  ***    Events last 24 hours: ***    PAST MEDICAL & SURGICAL HISTORY:  HLD (hyperlipidemia)    Mild cognitive impairment    DM (diabetes mellitus)    HTN (hypertension)          Hosp day #8d    Vent day #  Mode: AC/ CMV (Assist Control/ Continuous Mandatory Ventilation)  RR (machine): 28  TV (machine): 350  FiO2: 100  PEEP: 18  ITime: 1  MAP: 24  PIP: 40        Vital signs / Reviewed and Physical Exam Performed where pertinent and urgently required    Lab / Radiology  studies / ABG / Meds -  reviewed and interpreted into the assessment and treatment plan.      Assessment/Plan/Therapeutic interventions      Neuro - Sedation neuromuscular blockade to facilitate safe ventilation    CV -  Pressor support as needed to maintain MAP 65           Avoiding fluid challenges          QTC monitoring while on Azithromycin and Hydroxychloroquine.    Pulm -  ARDS-NET 4-6cc/kg IBW TV as able to maintain plateau pressures <30               Prone ventilation consideration as feasible  Pa02/Fi02 < 150 on Fi02 >60% and PEEP at least 5                 Vent bundle Reviewed     GI -  PPI  Enteric feeds as tolerated in tandem with NMB and prone ventilation    Renal - Even to negative fluid balance as tolerated by hemodynamics and renal fx.  Feeds to be provided in lieu of IVF.     Heme -  Pharmacologic DVT PPx  in addition to SCD's    ID - ABX discontinuation based on discussion with ID in conjunction with clinical features, culture data, and judicious procalcitonin monitoring.      Endo -  Aggressive glycemic control to limit FS glucose to < 180mg/dl.      COVID 19 specific considerations and therapeutic  options based on the available and rapidly changing literature    Goals of care considerations:  Ongoing assessment for patient specific treatment options based on progression or decline.  I have involved the family with updates and requests in guidance for medical decision making.          38  Minutes of critical care tiem spent in the management of this critically ill COVID-19 patient/PUI patient with continuous assessments and interventions based on the interpretation of multiple databases.   Patient is a 70y old  Female who presents with a chief complaint of sob and cough (20 Dec 2020 12:50)      BRIEF HOSPITAL COURSE: 70F with PMHx HLD, DM, HTN who presented with SOB. Hypoxemic, COVID+. Progressive hypoxemia despite escalating O2. Intubated.     Events last 24 hours: ***    PAST MEDICAL & SURGICAL HISTORY:  HLD (hyperlipidemia)    Mild cognitive impairment    DM (diabetes mellitus)    HTN (hypertension)          Hosp day #8d    Vent day #  Mode: AC/ CMV (Assist Control/ Continuous Mandatory Ventilation)  RR (machine): 28  TV (machine): 350  FiO2: 100  PEEP: 18  ITime: 1  MAP: 24  PIP: 40        Vital signs / Reviewed and Physical Exam Performed where pertinent and urgently required    Lab / Radiology  studies / ABG / Meds -  reviewed and interpreted into the assessment and treatment plan.      Impression:  1. acute hypercapnic/hypoxemic respiratory failure  2. ARDS  3. COVID-19 Viral PNA      Neuro - Sedation neuromuscular blockade to facilitate safe ventilation    CV -  Pressor support as needed to maintain MAP 65           Avoiding fluid challenges          QTC monitoring while on Azithromycin and Hydroxychloroquine.    Pulm -  ARDS-NET 4-6cc/kg IBW TV as able to maintain plateau pressures <30               Prone ventilation consideration as feasible  Pa02/Fi02 < 150 on Fi02 >60% and PEEP at least 5                 Vent bundle Reviewed     GI -  PPI  Enteric feeds as tolerated in tandem with NMB and prone ventilation    Renal - Even to negative fluid balance as tolerated by hemodynamics and renal fx.  Feeds to be provided in lieu of IVF.     Heme -  Pharmacologic DVT PPx  in addition to SCD's    ID - ABX discontinuation based on discussion with ID in conjunction with clinical features, culture data, and judicious procalcitonin monitoring.      Endo -  Aggressive glycemic control to limit FS glucose to < 180mg/dl.      COVID 19 specific considerations and therapeutic  options based on the available and rapidly changing literature    Goals of care considerations:  Ongoing assessment for patient specific treatment options based on progression or decline.  I have involved the family with updates and requests in guidance for medical decision making.          38  Minutes of critical care tiem spent in the management of this critically ill COVID-19 patient/PUI patient with continuous assessments and interventions based on the interpretation of multiple databases.   Patient is a 70y old  Female who presents with a chief complaint of sob and cough (20 Dec 2020 12:50)      BRIEF HOSPITAL COURSE: 70F with PMHx HLD, DM, HTN who presented with SOB. Hypoxemic, COVID+. Progressive hypoxemia despite escalating O2. Intubated.     Events last 24 hours: low grade temps, remains on pressors, +18 and 100% FiO2, worsening renal function, remains on full sedation, multiple episodes of hypoglycemia today, remains on steroids, Remdesivir course shortened due to STAS    PAST MEDICAL & SURGICAL HISTORY:  HLD (hyperlipidemia)    Mild cognitive impairment    DM (diabetes mellitus)    HTN (hypertension)          Hosp day #8d    Vent day #5d  Mode: AC/ CMV (Assist Control/ Continuous Mandatory Ventilation)  RR (machine): 28  TV (machine): 350  FiO2: 100  PEEP: 18  ITime: 1  MAP: 24  PIP: 40    I&O's Summary    19 Dec 2020 07:01  -  20 Dec 2020 07:00  --------------------------------------------------------  IN: 3013 mL / OUT: 440 mL / NET: 2573 mL    20 Dec 2020 07:01  -  21 Dec 2020 02:43  --------------------------------------------------------  IN: 1105 mL / OUT: 200 mL / NET: 905 mL      Vital signs / Reviewed and Physical Exam Performed where pertinent and urgently required    Lab / Radiology  studies / ABG / Meds -  reviewed and interpreted into the assessment and treatment plan.      Impression:  1. acute hypercapnic/hypoxemic respiratory failure  2. ARDS  3. COVID-19 Viral PNA  4. STAS  5. Ecoli UTI    Plan:  Neuro - Sedation neuromuscular blockade to facilitate safe ventilation    CV -  Pressor support as needed to maintain MAP 65           Avoiding fluid challenges          QTC monitoring while on Azithromycin and Hydroxychloroquine.    Pulm -  ARDS-NET 4-6cc/kg IBW TV as able to maintain plateau pressures <30               Prone ventilation consideration as feasible  Pa02/Fi02 < 150 on Fi02 >60% and PEEP at least 5                 Vent bundle Reviewed     GI -  PPI  Enteric feeds as tolerated in tandem with NMB and prone ventilation    Renal - Even to negative fluid balance as tolerated by hemodynamics and renal fx.  Feeds to be provided in lieu of IVF.     Heme -  Pharmacologic DVT PPx  in addition to SCD's    ID - ABX discontinuation based on discussion with ID in conjunction with clinical features, culture data, and judicious procalcitonin monitoring.      Endo -  Aggressive glycemic control to limit FS glucose to < 180mg/dl.      COVID 19 specific considerations and therapeutic  options based on the available and rapidly changing literature      35 Minutes of critical care time spent in the management of this critically ill COVID-19 patient with continuous assessments and interventions based on the interpretation of multiple databases.   Patient is a 70y old  Female who presents with a chief complaint of sob and cough (20 Dec 2020 12:50)      BRIEF HOSPITAL COURSE: 70F with PMHx HLD, DM, HTN who presented with SOB. Hypoxemic, COVID+. Progressive hypoxemia despite escalating O2. Intubated.     Events last 24 hours: low grade temps, remains on pressors, +18 and 100% FiO2, worsening renal function, remains on full sedation, multiple episodes of hypoglycemia today, remains on steroids, Remdesivir course shortened due to STAS, sp proning prior with no impact.    PAST MEDICAL & SURGICAL HISTORY:  HLD (hyperlipidemia)    Mild cognitive impairment    DM (diabetes mellitus)    HTN (hypertension)        Hosp day #8d    Vent day #5d    Mode: AC/ CMV (Assist Control/ Continuous Mandatory Ventilation)  RR (machine): 28  TV (machine): 350  FiO2: 100  PEEP: 18  ITime: 1  MAP: 24  PIP: 40    I&O's Summary    19 Dec 2020 07:01  -  20 Dec 2020 07:00  --------------------------------------------------------  IN: 3013 mL / OUT: 440 mL / NET: 2573 mL    20 Dec 2020 07:01  -  21 Dec 2020 02:43  --------------------------------------------------------  IN: 1105 mL / OUT: 200 mL / NET: 905 mL      Vital signs / Reviewed and Physical Exam Performed where pertinent and urgently required    Lab / Radiology  studies / ABG / Meds -  reviewed and interpreted into the assessment and treatment plan.      Impression:  1. acute hypercapnic/hypoxemic respiratory failure  2. ARDS  3. COVID-19 Viral PNA  4. STAS  5. Ecoli UTI  6. shock- sepsis versus drug induced    Plan:  Neuro - Sedation to facilitate safe ventilation    CV -  actively titrating pressors for MAP>65          addition of low dose midodrine to facilitate pressor weaning    Pulm -  full vent support with LTV 4-6cc/kg IBW, remains with elevated plts and peaks but with hypercarbia despite, increase MV with RR change             actively titrating FiO2 for sats>88%             utilization of PEEP for alveolar recruitment             Vent bundle in place and reviewed             cont steroids             will check bedside POCUS now to evaluate for sliding lung             CXR in am     GI -  H2 blocker adjusted for CrCl,  Enteric feeds as tolerated     Renal - Cr rising, urine output poor, no acute indication for HD at this time, avoid MAP<65, renally adjust all meds, avoid nephrotoxins, strict I/Os, BMP in am, nephro consult in am    Heme -  Pharmacologic DVT PPx  in addition to SCD's, no signs of active bleeding, transfuse for Hb<7 or active bleeding    ID - cont Steroids, short course of Remdesivir stopped due to STAS, UCx Ecoli sp abx, now on Meropenem. SCx No orgs, Abx adjustment based on discussion with ID in conjunction with         clinical features, culture data.    Endo -  Aggressive glycemic control to limit FS glucose to < 180mg/dl, multiple hypoglycemic events, d/c lantus for now, cont ISS q6hour, if becomes elevated will start lower dose.      COVID 19 specific considerations and therapeutic  options based on the available and rapidly changing literature      35 Minutes of critical care time spent in the management of this critically ill COVID-19 patient with continuous assessments and interventions based on the interpretation of multiple databases.

## 2020-12-21 PROBLEM — N39.0 ESCHERICHIA COLI URINARY TRACT INFECTION: Status: RESOLVED | Noted: 2019-11-20 | Resolved: 2020-01-01

## 2020-12-21 NOTE — PROCEDURE NOTE - NSPROCNAME_GEN_A_CORE
Arterial Puncture/Cannulation
Central Line Insertion
Tracheal Intubation
Arterial Puncture/Cannulation
Central Line Insertion

## 2020-12-21 NOTE — PROGRESS NOTE ADULT - SUBJECTIVE AND OBJECTIVE BOX
Hospital # 9  CCU # 8  Vent # 6  L SCV CVL # 6  R fem A-line # 2--REMOVED.  R Rad A-line # 4  RIJ HD Cath # 0  Cis # 3 (Stopped)    CC:  COVID    HPI:    71 y/o female pt of size with HTN, DM, HL, cognitive impairment and GALE/OHS presents with complaints of 1 week onset of productive cough, weakness, myalgia, lethargy, diarrhea and shortness of breath. Daughter and  both have covid and live with her. Daughter is currently hospitalized for covid pna    12/14:  Pt seen and examined in CCU--on BIPAP--O2 sat high 80s--Awake--increased WOB--able to speak in full sentences.  FS not well controlled.     12/15:  Cannot tolerate being off BIPAP.  Not eating.  Awake and alert.  Mod WOB.  O2 sat mid 80s    12/16:  Above noted.  emergently intubated yesterday.  On prop and cis gtts. No pressors.  Proned.  Ppressure 34    12/17:  Vented--Ppressure > 30.  On prop and cis gtts.  Tm 102.7  FS still high     12/18:  Vented--sedated.  65%/+ 16.  Meets criteria for prone    12/19:  Prone--did not really response to maneuver.  100%/+ 18.  On pressors--Norepi 0.08.  Cr rising.  Not doing well    12/20:  Not doing well.  Worsening renal failure.  on 100% + 18.  Pp 32. Tm 103.2  BM few days ago.  On pressors    12/21:  Doing very poorly.  Hypoxic on 100% + 20.  Asynchrony with Vent.  Remains on Norepi gtt.  No u/o.  Hypoglycemic O/N.  Does not respond to prone.  Case d/w Dr Leach--will initiate RRT today    PMH:  As above.     PSH:  As above.     FH: Non Contributory other than those listed in HPI    Social History:      MEDICATIONS  (STANDING):  aspirin  chewable 81 milliGRAM(s) Oral daily  chlorhexidine 0.12% Liquid 15 milliLiter(s) Oral Mucosa every 12 hours  cisatracurium Infusion 2 MICROgram(s)/kG/Min (10.9 mL/Hr) IV Continuous <Continuous>  dexAMETHasone  IVPB 8 milliGRAM(s) IV Intermittent daily  dextrose 40% Gel 15 Gram(s) Oral once  dextrose 5%. 1000 milliLiter(s) (50 mL/Hr) IV Continuous <Continuous>  dextrose 50% Injectable 25 Gram(s) IV Push once  famotidine    Tablet 20 milliGRAM(s) Oral daily  fentaNYL   Infusion... 0.5 MICROgram(s)/kG/Hr (2.27 mL/Hr) IV Continuous <Continuous>  glucagon  Injectable 1 milliGRAM(s) IntraMuscular once  heparin   Injectable 7500 Unit(s) SubCutaneous every 8 hours  insulin lispro (ADMELOG) corrective regimen sliding scale   SubCutaneous every 6 hours  lamoTRIgine 100 milliGRAM(s) Oral two times a day  meropenem  IVPB 500 milliGRAM(s) IV Intermittent every 12 hours  midodrine 5 milliGRAM(s) Oral every 8 hours  norepinephrine Infusion 0.05 MICROgram(s)/kG/Min (4.25 mL/Hr) IV Continuous <Continuous>  PARoxetine 20 milliGRAM(s) Oral daily  polyethylene glycol 3350 17 Gram(s) Oral daily  primidone 50 milliGRAM(s) Oral daily  primidone 150 milliGRAM(s) Oral at bedtime  primidone 250 milliGRAM(s) Oral daily  propofol Infusion 30 MICROgram(s)/kG/Min (16.3 mL/Hr) IV Continuous <Continuous>  senna 2 Tablet(s) Oral at bedtime  simvastatin 40 milliGRAM(s) Oral at bedtime    MEDICATIONS  (PRN):  acetaminophen   Tablet .. 650 milliGRAM(s) Oral every 4 hours PRN Temp greater or equal to 38.5C (101.3F)  ALBUTerol    90 MICROgram(s) HFA Inhaler 2 Puff(s) Inhalation every 4 hours PRN Shortness of Breath and/or Wheezing  LORazepam   Injectable 2 milliGRAM(s) IV Push every 4 hours PRN Agitation      Allergies: NKDA    ROS:  SEE BELOW        ICU Vital Signs Last 24 Hrs  T(C): 39.7 (21 Dec 2020 08:00), Max: 39.7 (21 Dec 2020 08:00)  T(F): 103.5 (21 Dec 2020 08:00), Max: 103.5 (21 Dec 2020 08:00)  HR: 91 (21 Dec 2020 10:00) (82 - 94)  BP: 98/59 (21 Dec 2020 10:00) (94/49 - 156/67)  BP(mean): 66 (21 Dec 2020 10:00) (54 - 88)  ABP: 70/53 (21 Dec 2020 10:00) (70/53 - 127/51)  ABP(mean): 60 (21 Dec 2020 10:00) (51 - 77)  RR: 28 (21 Dec 2020 10:00) (21 - 38)  SpO2: 82% (21 Dec 2020 10:00) (79% - 95%)      Mode: AC/ CMV (Assist Control/ Continuous Mandatory Ventilation)  RR (machine): 28  TV (machine): 350  FiO2: 100  PEEP: 18  ITime: 1  MAP: 25  PIP: 40      I&O's Summary    20 Dec 2020 07:01  -  21 Dec 2020 07:00  --------------------------------------------------------  IN: 2212 mL / OUT: 350 mL / NET: 1862 mL    21 Dec 2020 07:01  -  21 Dec 2020 10:52  --------------------------------------------------------  IN: 180.8 mL / OUT: 0 mL / NET: 180.8 mL        Physical Exam:  SEE BELOW                          10.7   15.80 )-----------( 444      ( 21 Dec 2020 06:30 )             34.6       12-21    129<L>  |  91<L>  |  113<H>  ----------------------------<  74  5.7<H>   |  24  |  3.93<H>    Ca    8.2<L>      21 Dec 2020 06:30  Phos  10.9     12-21  Mg     3.4     12-21              ABG - ( 21 Dec 2020 05:57 )  pH, Arterial: 7.20  pH, Blood: x     /  pCO2: 59    /  pO2: 55    / HCO3: 22    / Base Excess: -5.5  /  SaO2: 83                      DVT Prophylaxis:                                                            Contraindication:     Advanced Directives:    Discussed with:    Visit Information:  Time spent excluding procedure:  90 cc mins    ** Time is exclusive of billed procedures and/or teaching and/or routine family updates.

## 2020-12-21 NOTE — PROGRESS NOTE ADULT - ASSESSMENT
69 yo obese female with pmh niddm, htn, mild cognitive impairment, ?GALE/OHS, presents with complaints of 1 week onset of productive cough, weakness, mylagia, lethargy, diarrhea and shortness of breath. Daughter and  both have covid and live with her. Daughter is currently hospitalized for covid pna. Pt is lethargic but arousable on command. Requiring 4-5 L NC saturating 94%. Tachypneic but not using accessory muscles. History limited due to condition. Vital stable. CXR c/w bilateral patchy infiltrates. Elevated inflammatory markers. Here UA positive, concern for UTI, covid-19 pcr positive, was given IV cefepime/decadron/remdesivir, tx to CCU, hypoxic on NC, now on high flow o2.    1. Septic shock. Acute respiratory failure. covid-19 viral syndrome. multifocal pneumonia. ARF. s/p Ecoli UTI  - has persistent fever  - leukocytosis  - renal function is worsening; initiated on HD  - concerned about bacterial superimposed infection  - s/p remdesivir # 7 days  - on decadron 6mg daily # 9  - recent blood cx 12/12 staph hominis CONS - c/w contaminant  - completed 5 day course IV rocephin for Ecoli UTI --> 12/13-12/17  - on meropenem #3 - decrease dose 500 mg IVq24h   - continue abx coverage  - isolation precautions  - consider tapering steroids  - central/arterial lines clean  - poor prognosis   - fu cbc  - supportive care    2. other issues - care per medicine  71 yo obese female with pmh niddm, htn, mild cognitive impairment, ?GALE/OHS, presents with complaints of 1 week onset of productive cough, weakness, mylagia, lethargy, diarrhea and shortness of breath. Daughter and  both have covid and live with her. Daughter is currently hospitalized for covid pna. Pt is lethargic but arousable on command. Requiring 4-5 L NC saturating 94%. Tachypneic but not using accessory muscles. History limited due to condition. Vital stable. CXR c/w bilateral patchy infiltrates. Elevated inflammatory markers. Here UA positive, concern for UTI, covid-19 pcr positive, was given IV cefepime/decadron/remdesivir, tx to CCU, hypoxic on NC, now on high flow o2.    1. Septic shock. Acute respiratory failure. covid-19 viral syndrome. multifocal pneumonia. ARF. s/p Ecoli UTI  - has persistent fever  - leukocytosis  - renal function is worsening; initiated on HD  - concerned about bacterial superimposed infection  - s/p remdesivir # 7 days  - on decadron 6mg daily # 9  - recent blood cx 12/12 staph hominis CONS - c/w contaminant  - completed 5 day course IV rocephin for Ecoli UTI --> 12/13-12/17  - on meropenem #3 - decrease dose 500 mg IVq24h   - continue abx coverage  - isolation precautions  - consider tapering steroids  - central/arterial/shiley lines clean  - poor prognosis   - fu cbc  - supportive care    2. other issues - care per medicine

## 2020-12-21 NOTE — PROCEDURE NOTE - ADDITIONAL PROCEDURE DETAILS
Good dark non pulsatile flow from all ports--sutured in place.  Biopatch placed.    CXR--RIJ HD cath OK.  No PTX.  B/L infiltrates.  ETT OK
Ind of CC time    COVID 19 type 1 resp failure  ARDS
Unable to cannulate R rad artery.  Hemostasis obtained and good distal perfusions after attempt
Good dark non pulsatile flow from all ports.  Sutured at 1.5 cm STAT CXR ordered.  Biopatch placed

## 2020-12-21 NOTE — PROGRESS NOTE ADULT - SUBJECTIVE AND OBJECTIVE BOX
HPI:    70y Female pmhx HTN, DM, HLD, cognitive impairment, GALE/OHS admit  with complaint of 1 week productive cough. Admitted for COVID 19+ with acute hypoxic respiratory failure. Patient respiratory status continued to decline despite NIV and intubated earlier today. Hospital course further complicated by E.coli UTI.    COVID 19 ARDS type 1 resp failure    Now STAS on HD s/p HD 1.5 liter fluid removal    Difficult to oxygenate and ventilate   100% fi02 peep 20      Hospital  12/12  CCU  12/13  Vent 12/15  L SCV CVL 12/15  A-line to R radial 12/17          Vital signs/reviewed and physical exam performed where pertinent and urgently required.    Lab/radiology studies/ABG/meds reviewed and interpreted into the assessment and treatment plan.    Assessment/Plan/Therapeutic interventions      Neuro: Sedation neuromuscular blockade as needed to facilitate safe ventilation  nimbex/ prop/ fent    Cor:  Pressor support as needed to maintain MAP 65.  Avoiding fluid challenges.     Pulm:  ARDS-NET 4-6cc/kg IBW TV as able to maintain plateau pressures <30. Prone ventilation consideration as feasible.  Pa02/Fi02 < 150 on Fi02 >60% and PEEP at least 5.  maxed out vent support, not a candidate for ECMO steroid taper was on high dose decadron due to interaction with mysoline.      GI:  PPI  Enteric feeds as tolerated     Renal: Even to negative fluid balance as tolerated by hemodynamics and renal fx.      Heme:  Pharmacologic DVT P in addition to SCD's in low risk,  full AC in high risk  following D- Dimer clinical course and doppler studied where appropriate.   Heparin 7500u q8     ID: ABX discontinuation based on discussion with ID in conjunction with clinical features, culture data, and judicious procalcitonin monitoring.    Started on merrum as salvage   sputum 12/19 no growth     Endo Aggressive glycemic control to limit FS glucose to < 180mg/dl.        COVID 19 specific considerations and therapeutic options based on the available and rapidly changing literature    Goals of care considerations:  Ongoing assessment for patient specific treatment options based on progression or decline.  I have involved the family with updates and requests in guidance for medical decision making.      38 minutes of critical care time spent in the management of this critically ill COVID-19 patient with continuous assessments and interventions based on the interpretation of multiple databases.   HPI:    70y Female pmhx HTN, DM, HLD, cognitive impairment, GALE/OHS admit  with complaint of 1 week productive cough. Admitted for COVID 19+ with acute hypoxic respiratory failure. Patient respiratory status continued to decline despite NIV and intubated earlier today. Hospital course further complicated by E.coli UTI.    COVID 19 ARDS type 1 resp failure    Now STAS on HD s/p HD 1.5 liter fluid removal    Difficult to oxygenate and ventilate   100% fi02 peep 20   distributive shock on [facility]  12/12  CCU  12/13  Vent 12/15  L SCV CVL 12/15  A-line to R radial 12/17          Vital signs/reviewed and physical exam performed where pertinent and urgently required.    Lab/radiology studies/ABG/meds reviewed and interpreted into the assessment and treatment plan.    Assessment/Plan/Therapeutic interventions      Neuro: Sedation neuromuscular blockade as needed to facilitate safe ventilation  nimbex/ prop/ fent    Cor:  Pressor support as needed to maintain MAP 65.  Avoiding fluid challenges.     Pulm:  ARDS-NET 4-6cc/kg IBW TV as able to maintain plateau pressures <30. Prone ventilation consideration as feasible.  Pa02/Fi02 < 150 on Fi02 >60% and PEEP at least 5.  maxed out vent support, not a candidate for ECMO steroid taper was on high dose decadron due to interaction with mysoline.      GI:  PPI  Enteric feeds as tolerated     Renal: Even to negative fluid balance as tolerated by hemodynamics and renal fx.      Heme:  Pharmacologic DVT P in addition to SCD's in low risk,  full AC in high risk  following D- Dimer clinical course and doppler studied where appropriate.   Heparin 7500u q8     ID: ABX discontinuation based on discussion with ID in conjunction with clinical features, culture data, and judicious procalcitonin monitoring.    Started on merrum as salvage   sputum 12/19 no growth     Endo Aggressive glycemic control to limit FS glucose to < 180mg/dl.        COVID 19 specific considerations and therapeutic options based on the available and rapidly changing literature    Goals of care considerations:  Ongoing assessment for patient specific treatment options based on progression or decline.  I have involved the family with updates and requests in guidance for medical decision making.      38 minutes of critical care time spent in the management of this critically ill COVID-19 patient with continuous assessments and interventions based on the interpretation of multiple databases.

## 2020-12-21 NOTE — CONSULT NOTE ADULT - ASSESSMENT
HTN, DM, HDL, GALE/OHS presents with complaints of 1 week onset of productive cough, weakness, myalgia, lethargy, diarrhea and shortness of breath. Daughter and  with covid and STAS.    TSAS  -Oligoanuric with hyperkalemia and multifactorial acidosis  -HD today to control above, may need until/when covid stabilzes  -Likely plan for HD again tomorrow  -UF near 1 with HD if tolerated  -2K, HD 2 in AM    Covid/Respiratory failure  -Pressor support, intbuated  -suspect will need higher dose pressor when HD initiated  -supportive care, poor prognosis with multiorgan dysfunciton    Hyponatremia  -UF as able today  -No fw with feeds    thanks, will follow with you  d/c with RN staff, HD staff, Dr. Armando and Daughter Caitlin      HTN, DM, HDL, GALE/OHS presents with complaints of 1 week onset of productive cough, weakness, myalgia, lethargy, diarrhea and shortness of breath. Daughter and  with covid and STAS.    STAS  -Oligoanuric with hyperkalemia and multifactorial acidosis  -HD today to control above, may need until/when covid stabilzes  -Likely plan for HD again tomorrow  -UF near 1 with HD if tolerated  -2K, HD 2 in AM    Covid/Respiratory failure  -Pressor support, intbuated  -suspect will need higher dose pressor when HD initiated  -supportive care, poor prognosis with multiorgan dysfunciton    Hyponatremia  -UF as able today  -No fw with feeds    thanks, will follow with you  d/c with RN staff, HD staff, Dr. Armando and Daughter Caitlin     >52 min cc time spent with care of this patient

## 2020-12-21 NOTE — PROGRESS NOTE ADULT - SUBJECTIVE AND OBJECTIVE BOX
Date of service: 20 @ 12:50    Intubated on ventilatory support  High fevers tmax 103.5  Intubated on ventilatory support  Oliguric; started on HD today  On pressor support     ROS: unobtainable      MEDICATIONS  (STANDING):  aspirin  chewable 81 milliGRAM(s) Oral daily  chlorhexidine 0.12% Liquid 15 milliLiter(s) Oral Mucosa every 12 hours  cisatracurium Infusion 2 MICROgram(s)/kG/Min (10.9 mL/Hr) IV Continuous <Continuous>  dexAMETHasone  IVPB 8 milliGRAM(s) IV Intermittent daily  dextrose 40% Gel 15 Gram(s) Oral once  dextrose 5%. 1000 milliLiter(s) (50 mL/Hr) IV Continuous <Continuous>  dextrose 50% Injectable 25 Gram(s) IV Push once  famotidine    Tablet 20 milliGRAM(s) Oral daily  fentaNYL   Infusion... 0.5 MICROgram(s)/kG/Hr (2.27 mL/Hr) IV Continuous <Continuous>  glucagon  Injectable 1 milliGRAM(s) IntraMuscular once  heparin   Injectable 7500 Unit(s) SubCutaneous every 8 hours  insulin lispro (ADMELOG) corrective regimen sliding scale   SubCutaneous every 6 hours  lamoTRIgine 100 milliGRAM(s) Oral two times a day  meropenem  IVPB 500 milliGRAM(s) IV Intermittent every 24 hours  midodrine 5 milliGRAM(s) Oral every 8 hours  norepinephrine Infusion 0.05 MICROgram(s)/kG/Min (4.25 mL/Hr) IV Continuous <Continuous>  PARoxetine 20 milliGRAM(s) Oral daily  polyethylene glycol 3350 17 Gram(s) Oral daily  primidone 50 milliGRAM(s) Oral daily  primidone 150 milliGRAM(s) Oral at bedtime  primidone 250 milliGRAM(s) Oral daily  propofol Infusion 30 MICROgram(s)/kG/Min (16.3 mL/Hr) IV Continuous <Continuous>  senna 2 Tablet(s) Oral at bedtime  simvastatin 40 milliGRAM(s) Oral at bedtime      Vital Signs Last 24 Hrs  T(C): 39.7 (21 Dec 2020 08:00), Max: 39.7 (21 Dec 2020 08:00)  T(F): 103.5 (21 Dec 2020 08:00), Max: 103.5 (21 Dec 2020 08:00)  HR: 117 (21 Dec 2020 12:30) (82 - 128)  BP: 85/46 (21 Dec 2020 12:30) (83/45 - 156/67)  BP(mean): 57 (21 Dec 2020 12:30) (54 - 88)  RR: 28 (21 Dec 2020 12:30) (21 - 38)  SpO2: 81% (21 Dec 2020 12:30) (62% - 95%)      Constitutional: frail looking, intubated  HEENT: NC/AT, EOMI, PERRLA, conjunctivae clear  Neck: supple; thyroid not palpable  Back: no tenderness  Respiratory: decreased breath sounds, crackles   Cardiovascular: S1S2 regular, no murmurs  Abdomen: soft, not tender, not distended, positive BS  Genitourinary: no suprapubic tenderness  Lymphatic: no LN palpable  Musculoskeletal: no muscle tenderness, no joint swelling or tenderness  Extremities: no pedal edema  Neurological/ Psychiatric:  sedated/intubated; confused  Skin: no rashes; no palpable lesions    Labs: reviewed                                   10.7   15.80 )-----------( 444      ( 21 Dec 2020 06:30 )             34.6     12-21    129<L>  |  91<L>  |  113<H>  ----------------------------<  74  5.7<H>   |  24  |  3.93<H>    Ca    8.2<L>      21 Dec 2020 06:30  Phos  10.9     -  Mg     3.4     -21          Ferritin, Serum: 1156 ng/mL (20 @ 06:21)  C-Reactive Protein, Serum: 34.51 mg/dL (20 @ 06:21)  D-Dimer Assay, Quantitative: 1799 ng/mL DDU (20 @ 06:21)  Ferritin, Serum: 521 ng/mL (20 @ 13:43)  C-Reactive Protein, Serum: 15.70 mg/dL (20 @ 09:21)  D-Dimer Assay, Quantitative: 303 ng/mL DDU (20 @ 09:21)  Ferritin, Serum: 529 ng/mL (20 @ 09:21)                 Urinalysis Basic - ( 12 Dec 2020 09:45 )    Color: Yellow / Appearance: very cloudy / S.025 / pH: x  Gluc: x / Ketone: Large  / Bili: Negative / Urobili: 1 mg/dL   Blood: x / Protein: 500 mg/dL / Nitrite: Positive   Leuk Esterase: Small / RBC: >50 /HPF / WBC 26-50   Sq Epi: x / Non Sq Epi: Moderate / Bacteria: Many      Culture - Urine (collected 12 Dec 2020 09:45)  Source: .Urine Clean Catch (Midstream)  Final Report (14 Dec 2020 15:04):    >100,000 CFU/ml Escherichia coli  Organism: Escherichia coli (14 Dec 2020 15:04)  Organism: Escherichia coli (14 Dec 2020 15:04)      -  Amikacin: S <=16      -  Amoxicillin/Clavulanic Acid: S <=8/4      -  Ampicillin: S <=8 These ampicillin results predict results for amoxicillin      -  Ampicillin/Sulbactam: S <=4/2 Enterobacter, Citrobacter, and Serratia may develop resistance during prolonged therapy (3-4 days)      -  Aztreonam: S <=4      -  Cefazolin: S <=2 ([id number]) For uncomplicated UTI with K. pneumoniae, E. coli, or P. mirablis: SANCHO <=16 is sensitive and SANCHO >=32 is resistant. This also predicts results for oral agents cefaclor, cefdinir, cefpodoxime, cefprozil, cefuroxime axetil, cephalexin and locarbef for uncomplicated UTI. Note that some isolates may be susceptible to these agents while testing resistant to cefazolin.      -  Cefepime: S <=2      -  Cefoxitin: S <=8      -  Ceftriaxone: S <=1 Enterobacter, Citrobacter, and Serratia may develop resistance during prolonged therapy      -  Ciprofloxacin: S <=0.25      -  Ertapenem: S <=0.5      -  Gentamicin: S <=2      -  Imipenem: S <=1      -  Levofloxacin: S <=0.5      -  Meropenem: S <=1      -  Nitrofurantoin: S <=32 Should not be used to treat pyelonephritis      -  Piperacillin/Tazobactam: S <=8      -  Tigecycline: S <=2      -  Tobramycin: S <=2      -  Trimethoprim/Sulfamethoxazole: S <=0.5/9.5      Method Type: SANCHO    Culture - Blood (collected 12 Dec 2020 09:21)  Source: .Blood Blood-Peripheral  Gram Stain (13 Dec 2020 04:42):    Growth in aerobic bottle: Gram positive cocci in pairs  Final Report (14 Dec 2020 08:53):    Growth in aerobic bottle: Staphylococcus hominis    Coag Negative Staphylococcus  Organism: Blood Culture PCR (14 Dec 2020 08:53)      -  Coagulase negative Staphylococcus: Detec      Method Type: PCR    Culture - Blood (collected 12 Dec 2020 09:21)  Source: .Blood Blood-Peripheral  Gram Stain (13 Dec 2020 19:38):    Growth in anaerobic bottle: Gram Positive Cocci in Clusters  Final Report (15 Dec 2020 21:13):    Growth in anaerobic bottle: Staphylococcus hominis  Organism: Staphylococcus hominis (15 Dec 2020 21:13)  Organism: Staphylococcus hominis (15 Dec 2020 21:13)      -  Ampicillin/Sulbactam: S <=8/4      -  Cefazolin: S <=4      -  Clindamycin: S <=0.25      -  Erythromycin: R >4      -  Gentamicin: S <=1 Should not be used as monotherapy      -  Oxacillin: S <=0.25      -  RIF- Rifampin: S <=1 Should not be used as monotherapy      -  Tetra/Doxy: S <=1      -  Trimethoprim/Sulfamethoxazole: R >2/38      -  Vancomycin: S 1      Method Type: SANCHO        Radiology: all available radiological tests reviewed  < from: Xray Chest 1 View- PORTABLE-Urgent (20 @ 10:28) >    EXAM:  XR CHEST PORTABLE URGENT 1V                            PROCEDURE DATE:  2020          INTERPRETATION:  AP chest on 2020 at 10:22 AM. Patient has sepsis, cough, and shortness of breath.    COMPARISON: None available.    Heart may be enlarged.    There is a slight infiltrate in the left midlung field and possibly another in the retrocardiac area.    Small infiltrate off right upper hilum is also possible.    IMPRESSION: Small infiltrates as above.      < from: Xray Chest 1 View- PORTABLE-Routine (Xray Chest 1 View- PORTABLE-Routine .) (20 @ 12:57) >  The lungs show similar unchanged bilateral  scattered and diffuse multifocal ill-defined airspace consolidations. No pneumothorax.    < end of copied text >        Advanced directives addressed: full resuscitation

## 2020-12-21 NOTE — PROGRESS NOTE ADULT - ASSESSMENT
IMP:    69 y/o female pt of size with HTN, DM, HL, cognitive impairment and GALE/OHS admitted with viral PNA secondary to COVID  Acute type 1 resp failure  ARDS  Septic shock  STAS (KDIGO 2) related to ATN--baseline Cr < 1.0  Will RRT today  E. coli UTI on CTX--completed 5 days of Abx  Severe Prot [place]ley malnutrition     OGT placed on 12/15, tip below the diaphragm for meds/nutrition     High risk for acute decompensation and deterioration including death.  Critically ill     Plan:    Full vent support--LPV strategy to maintain plateau pressures < 30--High PEEP/low TV--Permissive hypercapnea and acidemia. No prone--not responsive  Prop and fent gtt.  Restart Cis gtt for 24 hrs   Started on Mark (3) for possible superimposed bacterial infection   HD cath for RRT today  Actively titrate pressors to keep MAP > 60  TF   HOB > 30  Bowel regime--Added senna   Cont with Rem/Deca (6)--Will stop Rem given STAS--will taper down steroids starting today  CTX (5)--stop after today dose  FS with insulin coverage--keep FS < 180.  Lantus stopped.  Will adjust as needed  DVT prophy--SCD and stop LMWH--start sqhep 7500 q 8    CCU care-- d/w ICU staff on multi disciplinary rounds

## 2020-12-21 NOTE — CONSULT NOTE ADULT - SUBJECTIVE AND OBJECTIVE BOX
70y Female whom presented to the hospital with HTN, DM, HLD, cognitive impairment and GALE/OHS admited with 1 week onset of productive cough, weakness, myalgia, lethargy, diarrhea and shortness of breath, renal evaluation of STAS and hyperkaleima. Daughter and  both have covid and live with her and daughter is currently hospitalized for covid pna as well. Diminished UOP, oligoanuric this AM with only 20 cc since chane of shift.     PAST MEDICAL & SURGICAL HISTORY:  HLD (hyperlipidemia)    Mild cognitive impairment    DM (diabetes mellitus)    HTN (hypertension)        MEDICATIONS  (STANDING):  aspirin  chewable 81 milliGRAM(s) Oral daily  chlorhexidine 0.12% Liquid 15 milliLiter(s) Oral Mucosa every 12 hours  dexAMETHasone  IVPB 8 milliGRAM(s) IV Intermittent daily  dextrose 40% Gel 15 Gram(s) Oral once  dextrose 5%. 1000 milliLiter(s) (50 mL/Hr) IV Continuous <Continuous>  dextrose 50% Injectable 25 Gram(s) IV Push once  famotidine    Tablet 20 milliGRAM(s) Oral daily  fentaNYL   Infusion... 0.5 MICROgram(s)/kG/Hr (2.27 mL/Hr) IV Continuous <Continuous>  glucagon  Injectable 1 milliGRAM(s) IntraMuscular once  heparin   Injectable 7500 Unit(s) SubCutaneous every 8 hours  insulin lispro (ADMELOG) corrective regimen sliding scale   SubCutaneous every 6 hours  lamoTRIgine 100 milliGRAM(s) Oral two times a day  meropenem  IVPB 500 milliGRAM(s) IV Intermittent every 12 hours  midodrine 5 milliGRAM(s) Oral every 8 hours  norepinephrine Infusion 0.05 MICROgram(s)/kG/Min (4.25 mL/Hr) IV Continuous <Continuous>  PARoxetine 20 milliGRAM(s) Oral daily  polyethylene glycol 3350 17 Gram(s) Oral daily  primidone 250 milliGRAM(s) Oral daily  primidone 50 milliGRAM(s) Oral daily  primidone 150 milliGRAM(s) Oral at bedtime  propofol Infusion 30 MICROgram(s)/kG/Min (16.3 mL/Hr) IV Continuous <Continuous>  senna 2 Tablet(s) Oral at bedtime  simvastatin 40 milliGRAM(s) Oral at bedtime    MEDICATIONS  (PRN):  acetaminophen   Tablet .. 650 milliGRAM(s) Oral every 4 hours PRN Temp greater or equal to 38.5C (101.3F)  ALBUTerol    90 MICROgram(s) HFA Inhaler 2 Puff(s) Inhalation every 4 hours PRN Shortness of Breath and/or Wheezing  LORazepam   Injectable 2 milliGRAM(s) IV Push every 4 hours PRN Agitation      Allergies    No Known Allergies    Intolerances        SOCIAL HISTORY:    FAMILY HISTORY:      REVIEW OF SYSTEMS:    CONSTITUTIONAL: No weakness, fevers or chills  EYES/ENT: No visual changes;  No vertigo or throat pain   NECK: No pain or stiffness  RESPIRATORY: No cough, wheezing, hemoptysis; No shortness of breath  CARDIOVASCULAR: No chest pain or palpitations  GASTROINTESTINAL: No abdominal or epigastric pain. No nausea, vomiting, or hematemesis; No diarrhea or constipation. No melena or hematochezia.  GENITOURINARY: No dysuria, frequency or hematuria  NEUROLOGICAL: No numbness or weakness  SKIN: No itching, burning, rashes, or lesions   All other review of systems is negative unless indicated above.      T(C): , Max: 39.3 (12-21-20 @ 06:00)  T(F): , Max: 102.8 (12-21-20 @ 06:00)  HR: 89 (12-21-20 @ 09:05)  BP: 104/43 (12-21-20 @ 08:00)  BP(mean): 57 (12-21-20 @ 08:00)  RR: 31 (12-21-20 @ 08:00)  SpO2: 82% (12-21-20 @ 09:05)  Wt(kg): --    12-20 @ 07:01  -  12-21 @ 07:00  --------------------------------------------------------  IN: 2212 mL / OUT: 350 mL / NET: 1862 mL    12-21 @ 07:01  -  12-21 @ 09:34  --------------------------------------------------------  IN: 180.8 mL / OUT: 0 mL / NET: 180.8 mL          PHYSICAL EXAM:    Constitutional: NAD, well-groomed, well-developed  HEENT: PERRLA, EOMI,  MMM  Neck: No LAD, No JVD  Respiratory: CTAB  Cardiovascular: S1 and S2, RRR  Gastrointestinal: BS+, soft, NT/ND  Extremities: No peripheral edema  Neurological: A/O x 3, no focal deficits  Psychiatric: Normal mood, normal affect  : No Miles  Skin: No rashes  Access: Not applicable        LABS:                        10.7   15.80 )-----------( 444      ( 21 Dec 2020 06:30 )             34.6     21 Dec 2020 06:30    129    |  91     |  113    ----------------------------<  74     5.7     |  24     |  3.93   20 Dec 2020 06:21    133    |  96     |  97     ----------------------------<  83     4.9     |  26     |  2.74   19 Dec 2020 06:00    136    |  100    |  82     ----------------------------<  123    4.9     |  29     |  1.89   18 Dec 2020 05:45    138    |  102    |  53     ----------------------------<  275    4.3     |  29     |  0.98     Ca    8.2        21 Dec 2020 06:30  Ca    8.3        20 Dec 2020 06:21  Ca    8.4        19 Dec 2020 06:00  Ca    8.2        18 Dec 2020 05:45  Phos  10.9      21 Dec 2020 06:30  Phos  8.7       20 Dec 2020 06:21  Phos  6.9       19 Dec 2020 06:00  Phos  4.5       18 Dec 2020 05:45  Mg     3.4       21 Dec 2020 06:30  Mg     3.2       20 Dec 2020 06:21  Mg     3.1       19 Dec 2020 06:00  Mg     2.9       18 Dec 2020 05:45    TPro  6.8    /  Alb  1.5    /  TBili  0.7    /  DBili  0.6    /  AST  71     /  ALT  69     /  AlkPhos  119    19 Dec 2020 06:00  TPro  6.2    /  Alb  1.6    /  TBili  0.5    /  DBili  0.3    /  AST  67     /  ALT  66     /  AlkPhos  125    18 Dec 2020 05:45          Urine Studies:          RADIOLOGY & ADDITIONAL STUDIES:                     70y Female whom presented to the hospital with HTN, DM, HLD, cognitive impairment and GALE/OHS admited with 1 week onset of productive cough, weakness, myalgia, lethargy, diarrhea and shortness of breath, renal evaluation of STAS and hyperkaleima. Daughter and  both have covid and live with her and daughter is currently hospitalized for covid pna as well. Diminished UOP, oligoanuric this AM with only 20 cc since chane of shift.     PAST MEDICAL & SURGICAL HISTORY:  HLD (hyperlipidemia)    Mild cognitive impairment    DM (diabetes mellitus)    HTN (hypertension)        MEDICATIONS  (STANDING):  aspirin  chewable 81 milliGRAM(s) Oral daily  chlorhexidine 0.12% Liquid 15 milliLiter(s) Oral Mucosa every 12 hours  dexAMETHasone  IVPB 8 milliGRAM(s) IV Intermittent daily  dextrose 40% Gel 15 Gram(s) Oral once  dextrose 5%. 1000 milliLiter(s) (50 mL/Hr) IV Continuous <Continuous>  dextrose 50% Injectable 25 Gram(s) IV Push once  famotidine    Tablet 20 milliGRAM(s) Oral daily  fentaNYL   Infusion... 0.5 MICROgram(s)/kG/Hr (2.27 mL/Hr) IV Continuous <Continuous>  glucagon  Injectable 1 milliGRAM(s) IntraMuscular once  heparin   Injectable 7500 Unit(s) SubCutaneous every 8 hours  insulin lispro (ADMELOG) corrective regimen sliding scale   SubCutaneous every 6 hours  lamoTRIgine 100 milliGRAM(s) Oral two times a day  meropenem  IVPB 500 milliGRAM(s) IV Intermittent every 12 hours  midodrine 5 milliGRAM(s) Oral every 8 hours  norepinephrine Infusion 0.05 MICROgram(s)/kG/Min (4.25 mL/Hr) IV Continuous <Continuous>  PARoxetine 20 milliGRAM(s) Oral daily  polyethylene glycol 3350 17 Gram(s) Oral daily  primidone 250 milliGRAM(s) Oral daily  primidone 50 milliGRAM(s) Oral daily  primidone 150 milliGRAM(s) Oral at bedtime  propofol Infusion 30 MICROgram(s)/kG/Min (16.3 mL/Hr) IV Continuous <Continuous>  senna 2 Tablet(s) Oral at bedtime  simvastatin 40 milliGRAM(s) Oral at bedtime    MEDICATIONS  (PRN):  acetaminophen   Tablet .. 650 milliGRAM(s) Oral every 4 hours PRN Temp greater or equal to 38.5C (101.3F)  ALBUTerol    90 MICROgram(s) HFA Inhaler 2 Puff(s) Inhalation every 4 hours PRN Shortness of Breath and/or Wheezing  LORazepam   Injectable 2 milliGRAM(s) IV Push every 4 hours PRN Agitation      Allergies    No Known Allergies    Intolerances        SOCIAL HISTORY:  no etoh/cigg now    FAMILY HISTORY:      REVIEW OF SYSTEMS:    uto, inbuated      T(C): , Max: 39.3 (12-21-20 @ 06:00)  T(F): , Max: 102.8 (12-21-20 @ 06:00)  HR: 89 (12-21-20 @ 09:05)  BP: 104/43 (12-21-20 @ 08:00)  BP(mean): 57 (12-21-20 @ 08:00)  RR: 31 (12-21-20 @ 08:00)  SpO2: 82% (12-21-20 @ 09:05)  Wt(kg): --    12-20 @ 07:01  -  12-21 @ 07:00  --------------------------------------------------------  IN: 2212 mL / OUT: 350 mL / NET: 1862 mL    12-21 @ 07:01  -  12-21 @ 09:34  --------------------------------------------------------  IN: 180.8 mL / OUT: 0 mL / NET: 180.8 mL          PHYSICAL EXAM:    Constitutional: int  HEENT:  MMM  Neck: No LAD, No JVD  Respiratory: dist bs  Cardiovascular: S1 and S2  Extremities: tr peripheral edema  Neurological: int  :  Miles  Skin: No rashes  Access: tlc L        LABS:                        10.7   15.80 )-----------( 444      ( 21 Dec 2020 06:30 )             34.6     21 Dec 2020 06:30    129    |  91     |  113    ----------------------------<  74     5.7     |  24     |  3.93   20 Dec 2020 06:21    133    |  96     |  97     ----------------------------<  83     4.9     |  26     |  2.74   19 Dec 2020 06:00    136    |  100    |  82     ----------------------------<  123    4.9     |  29     |  1.89   18 Dec 2020 05:45    138    |  102    |  53     ----------------------------<  275    4.3     |  29     |  0.98     Ca    8.2        21 Dec 2020 06:30  Ca    8.3        20 Dec 2020 06:21  Ca    8.4        19 Dec 2020 06:00  Ca    8.2        18 Dec 2020 05:45  Phos  10.9      21 Dec 2020 06:30  Phos  8.7       20 Dec 2020 06:21  Phos  6.9       19 Dec 2020 06:00  Phos  4.5       18 Dec 2020 05:45  Mg     3.4       21 Dec 2020 06:30  Mg     3.2       20 Dec 2020 06:21  Mg     3.1       19 Dec 2020 06:00  Mg     2.9       18 Dec 2020 05:45    TPro  6.8    /  Alb  1.5    /  TBili  0.7    /  DBili  0.6    /  AST  71     /  ALT  69     /  AlkPhos  119    19 Dec 2020 06:00  TPro  6.2    /  Alb  1.6    /  TBili  0.5    /  DBili  0.3    /  AST  67     /  ALT  66     /  AlkPhos  125    18 Dec 2020 05:45          Urine Studies:          RADIOLOGY & ADDITIONAL STUDIES:

## 2020-12-21 NOTE — PROCEDURE NOTE - NSINDICATIONS_GEN_A_CORE
arterial puncture to obtain ABG's/cannulation purposes/critical patient
hypertonic/irritant infusion/venous access/critical illness/emergency venous access/hemodynamic monitoring
critical patient/monitoring purposes/arterial puncture to obtain ABG's/blood sampling
COVID/respiratory distress
dialysis/CRRT

## 2020-12-21 NOTE — PROVIDER CONTACT NOTE (EICU) - ACTION/TREATMENT ORDERED:
Spontaneous breathing trial and sedation awake trial screening orders placed. Pt to be screened as per ICU protocol.

## 2020-12-22 NOTE — PROVIDER CONTACT NOTE (CHANGE IN STATUS NOTIFICATION) - BACKGROUND
Pt hypotensive throughout night, levophed drip titrated up to 0.7 mcg/kg/min. fever spiked from 101 to 103.3 rectally and pt converted into TREVOR and then VTach with rate in 160's.
Pt covid positive . admitted with viral PNA
pt covid pos . admitted for hypoxemia

## 2020-12-22 NOTE — PROGRESS NOTE ADULT - ENMT COMMENTS
Orally intubated

## 2020-12-22 NOTE — PROVIDER CONTACT NOTE (CHANGE IN STATUS NOTIFICATION) - ACTION/TREATMENT ORDERED:
Pt transferred to CCU
Started Amio gtt at 1, EKG done at 0600 and now amio rate 0.5. Vasopressin added for hypotension with improvement in BP noted.
continue to monitor o2 sat with NRB give Tylenol

## 2020-12-22 NOTE — PROGRESS NOTE ADULT - REASON FOR ADMISSION
sob and cough

## 2020-12-22 NOTE — PROGRESS NOTE ADULT - PROVIDER SPECIALTY LIST ADULT
Critical Care
Infectious Disease
MICU
Critical Care
Critical Care
Infectious Disease
Infectious Disease
Nephrology
Infectious Disease
Infectious Disease
Critical Care

## 2020-12-22 NOTE — PROVIDER CONTACT NOTE (CHANGE IN STATUS NOTIFICATION) - SITUATION
Pt on NRB found to have and O2 saturation in the low 80's efforts to increase saturation unsuccessful  despite repositioning. saturation levels went as low as the low 70. RRT was called
pt O2 sat on nc 6l 85%/ pt with 100.8  oral temperature
Pt went into Vtach with a rate of 160s with a good pulse. Hypotensive on levophed.

## 2020-12-22 NOTE — PROGRESS NOTE ADULT - NUTRITIONAL ASSESSMENT
This patient has been assessed with a concern for Malnutrition and has been determined to have a diagnosis/diagnoses of Severe protein-calorie malnutrition.    This patient is being managed with:   Diet NPO with Tube Feed-  Tube Feeding Modality: Orogastric  Glucerna 1.5 [place] (GLUCERNA1.5)  Total Volume for 24 Hours (mL): 840  Continuous  Starting Tube Feed Rate {mL per Hour}: 35  Until Goal Tube Feed Rate (mL per Hour): 35  Tube Feed Duration (in Hours): 24  Tube Feed Start Time: 00:00  No Carb Prosource TF     Qty per Day:  4  Entered: Dec 15 2020 10:07PM    
This patient has been assessed with a concern for Malnutrition and has been determined to have a diagnosis/diagnoses of Severe protein-calorie malnutrition.    This patient is being managed with:   Diet NPO with Tube Feed-  Tube Feeding Modality: Orogastric  Nepro with Carb Steady (NEPRORTH)  Total Volume for 24 Hours (mL): 720  Continuous  Starting Tube Feed Rate {mL per Hour}: 30  Until Goal Tube Feed Rate (mL per Hour): 30  Tube Feed Duration (in Hours): 24  Tube Feed Start Time: 00:00  No Carb Prosource TF     Qty per Day:  4  Entered: Dec 19 2020 11:37AM    
This patient has been assessed with a concern for Malnutrition and has been determined to have a diagnosis/diagnoses of Severe protein-calorie malnutrition.    This patient is being managed with:   Diet NPO with Tube Feed-  Tube Feeding Modality: Orogastric  Nepro with Carb Steady (NEPRORTH)  Total Volume for 24 Hours (mL): 720  Continuous  Starting Tube Feed Rate {mL per Hour}: 30  Until Goal Tube Feed Rate (mL per Hour): 30  Tube Feed Duration (in Hours): 24  Tube Feed Start Time: 00:00  No Carb Prosource TF     Qty per Day:  4  Entered: Dec 19 2020 11:37AM    
This patient has been assessed with a concern for Malnutrition and has been determined to have a diagnosis/diagnoses of Severe protein-calorie malnutrition.    This patient is being managed with:   Diet NPO with Tube Feed-  Tube Feeding Modality: Orogastric  Glucerna 1.5 [place] (GLUCERNA1.5)  Total Volume for 24 Hours (mL): 840  Continuous  Starting Tube Feed Rate {mL per Hour}: 35  Until Goal Tube Feed Rate (mL per Hour): 35  Tube Feed Duration (in Hours): 24  Tube Feed Start Time: 00:00  No Carb Prosource TF     Qty per Day:  4  Entered: Dec 15 2020 10:07PM    
This patient has been assessed with a concern for Malnutrition and has been determined to have a diagnosis/diagnoses of Severe protein-calorie malnutrition.    This patient is being managed with:   Diet NPO with Tube Feed-  Tube Feeding Modality: Orogastric  Glucerna 1.5 [place] (GLUCERNA1.5)  Total Volume for 24 Hours (mL): 840  Continuous  Starting Tube Feed Rate {mL per Hour}: 35  Until Goal Tube Feed Rate (mL per Hour): 35  Tube Feed Duration (in Hours): 24  Tube Feed Start Time: 00:00  No Carb Prosource TF     Qty per Day:  4  Entered: Dec 15 2020 10:07PM    
This patient has been assessed with a concern for Malnutrition and has been determined to have a diagnosis/diagnoses of Severe protein-calorie malnutrition.    This patient is being managed with:   Diet NPO with Tube Feed-  Tube Feeding Modality: Orogastric  Nepro with Carb Steady (NEPRORTH)  Total Volume for 24 Hours (mL): 720  Continuous  Starting Tube Feed Rate {mL per Hour}: 30  Until Goal Tube Feed Rate (mL per Hour): 30  Tube Feed Duration (in Hours): 24  Tube Feed Start Time: 00:00  No Carb Prosource TF     Qty per Day:  4  Entered: Dec 19 2020 11:37AM    
This patient has been assessed with a concern for Malnutrition and has been determined to have a diagnosis/diagnoses of Severe protein-calorie malnutrition.    This patient is being managed with:   Diet NPO with Tube Feed-  Tube Feeding Modality: Orogastric  Nepro with Carb Steady (NEPRORTH)  Total Volume for 24 Hours (mL): 720  Continuous  Starting Tube Feed Rate {mL per Hour}: 30  Until Goal Tube Feed Rate (mL per Hour): 30  Tube Feed Duration (in Hours): 24  Tube Feed Start Time: 00:00  No Carb Prosource TF     Qty per Day:  4  Entered: Dec 19 2020 11:37AM    
This patient has been assessed with a concern for Malnutrition and has been determined to have a diagnosis/diagnoses of Severe protein-calorie malnutrition.    This patient is being managed with:   Diet NPO with Tube Feed-  Tube Feeding Modality: Orogastric  Glucerna 1.5 [place] (GLUCERNA1.5)  Total Volume for 24 Hours (mL): 840  Continuous  Starting Tube Feed Rate {mL per Hour}: 35  Until Goal Tube Feed Rate (mL per Hour): 35  Tube Feed Duration (in Hours): 24  Tube Feed Start Time: 00:00  No Carb Prosource TF     Qty per Day:  4  Entered: Dec 15 2020 10:07PM    
This patient has been assessed with a concern for Malnutrition and has been determined to have a diagnosis/diagnoses of Severe protein-calorie malnutrition.    This patient is being managed with:   Diet NPO with Tube Feed-  Tube Feeding Modality: Orogastric  Nepro with Carb Steady (NEPRORTH)  Total Volume for 24 Hours (mL): 720  Continuous  Starting Tube Feed Rate {mL per Hour}: 30  Until Goal Tube Feed Rate (mL per Hour): 30  Tube Feed Duration (in Hours): 24  Tube Feed Start Time: 00:00  No Carb Prosource TF     Qty per Day:  4  Entered: Dec 19 2020 11:37AM

## 2020-12-22 NOTE — PROGRESS NOTE ADULT - NEUROLOGICAL DETAILS
Sedated
Grossly non focal/alert and oriented x 3
Sedated
Sedated
alert and oriented x 3/Grossly non focal
Sedated and Para
Sedated

## 2020-12-22 NOTE — DISCHARGE NOTE FOR THE EXPIRED PATIENT - HOSPITAL COURSE
69 y/o female pt of size, HTN, DM and HL admitted with viral PNA secondary to COVID-19, Acute type 1 resp failure, STAS on RRT, septic shock and ARDS.  Treated with Rem/Dec and empiric Abx along with RRT.  Had episode of VT started on IV Amio.  Pt was on 3 pressors and developed cardiac arrest.  Time of expiration 1252    Family notified via phone

## 2020-12-22 NOTE — PROGRESS NOTE ADULT - ASSESSMENT
70 HTN, DM, HDL, GALE/OHS presents with complaints of 1 week onset of productive cough, weakness, myalgia, lethargy, diarrhea and shortness of breath. Daughter and  with covid and STAS.    STAS  -Oligoanuric with hyperkalemia and multifactorial acidosis  -HD 2 today to control above, may need until/when covid stabilzes  -Likely plan for HD again tomorrow  -UF as tolerated  -1 k bath with last hr of hd today  -Consider add carafate QID with hyperphosphatemia    Covid/Respiratory failure  -Pressor support, intubuated  -supportive care, poor prognosis with multiorgan dysfunciton    Hyponatremia  -UF as able today  -No fw with feeds    d/c with RN staff, HD staff, Dr. Armando

## 2020-12-22 NOTE — CHART NOTE - NSCHARTNOTESELECT_GEN_ALL_CORE
CCM
Dietitian Follow Up
Dietitian Follow Up MACKENZIE zeng
Event Note
Family Update/Event Note

## 2020-12-22 NOTE — PROGRESS NOTE ADULT - ASSESSMENT
71 yo obese female with pmh niddm, htn, mild cognitive impairment, ?GALE/OHS, presents with complaints of 1 week onset of productive cough, weakness, mylagia, lethargy, diarrhea and shortness of breath. Daughter and  both have covid and live with her. Daughter is currently hospitalized for covid pna. Pt is lethargic but arousable on command. Requiring 4-5 L NC saturating 94%. Tachypneic but not using accessory muscles. History limited due to condition. Vital stable. CXR c/w bilateral patchy infiltrates. Elevated inflammatory markers. Here UA positive, concern for UTI, covid-19 pcr positive, was given IV cefepime/decadron/remdesivir, tx to CCU, hypoxic on NC, now on high flow o2.    1. Septic shock. Acute respiratory failure. covid-19 viral syndrome. multifocal pneumonia. ARF. s/p Ecoli UTI  - has persistent fever  - leukocytosis  - renal function is worsening; now on HD   - concerned about bacterial superimposed infection  - s/p remdesivir # 7 days  - on decadron 6mg daily # 10  - recent blood cx 12/12 staph hominis CONS - c/w contaminant  - completed 5 day course IV rocephin for Ecoli UTI --> 12/13-12/17  - on meropenem 500 mg IVq24h #4  - continue abx coverage   - isolation precautions  - central/arterial/shiley lines clean  - poor prognosis       - fu cbc  - supportive care    2. other issues - care per medicine

## 2020-12-22 NOTE — PROVIDER CONTACT NOTE (CHANGE IN STATUS NOTIFICATION) - ASSESSMENT
PA came to bedside, Zoll monitor and pads applied. Amiodarone bolus given by ICU PA with slower rate noted.
pt alert and prone . pt  put on NRB at 15L

## 2020-12-22 NOTE — PROVIDER CONTACT NOTE (EICU) - ACTION/TREATMENT ORDERED:
Vasopressin drip at 0.04 units/min, amiodarone drip at 1mg/min for 6hrs followed by 0.5mg/min for 18 hours was ordered as requested by TREE Segura.

## 2020-12-22 NOTE — PROGRESS NOTE ADULT - SUBJECTIVE AND OBJECTIVE BOX
Date of service: 20 @ 09:42    Febrile to 101  Intubated on ventilatory support, hypoxic to 80s  Oliguric; plan for dialysis today  On multiple pressor support  On pressor support     ROS: unobtainable    MEDICATIONS  (STANDING):  aMIOdarone Infusion 1 mG/Min (33.3 mL/Hr) IV Continuous <Continuous>  aMIOdarone Infusion 0.5 mG/Min (16.7 mL/Hr) IV Continuous <Continuous>  aspirin  chewable 81 milliGRAM(s) Oral daily  chlorhexidine 0.12% Liquid 15 milliLiter(s) Oral Mucosa every 12 hours  cisatracurium Infusion 2 MICROgram(s)/kG/Min (10.9 mL/Hr) IV Continuous <Continuous>  dexAMETHasone  IVPB 8 milliGRAM(s) IV Intermittent daily  dextrose 40% Gel 15 Gram(s) Oral once  dextrose 5%. 1000 milliLiter(s) (50 mL/Hr) IV Continuous <Continuous>  dextrose 50% Injectable 25 Gram(s) IV Push once  famotidine    Tablet 20 milliGRAM(s) Oral daily  fentaNYL   Infusion... 0.5 MICROgram(s)/kG/Hr (2.27 mL/Hr) IV Continuous <Continuous>  glucagon  Injectable 1 milliGRAM(s) IntraMuscular once  heparin   Injectable 7500 Unit(s) SubCutaneous every 8 hours  insulin lispro (ADMELOG) corrective regimen sliding scale   SubCutaneous every 6 hours  lamoTRIgine 100 milliGRAM(s) Oral two times a day  meropenem  IVPB 500 milliGRAM(s) IV Intermittent every 24 hours  midodrine 5 milliGRAM(s) Oral every 8 hours  norepinephrine Infusion 0.05 MICROgram(s)/kG/Min (4.25 mL/Hr) IV Continuous <Continuous>  PARoxetine 20 milliGRAM(s) Oral daily  phenylephrine    Infusion 0.5 MICROgram(s)/kG/Min (8.5 mL/Hr) IV Continuous <Continuous>  polyethylene glycol 3350 17 Gram(s) Oral daily  primidone 150 milliGRAM(s) Oral at bedtime  primidone 250 milliGRAM(s) Oral daily  primidone 50 milliGRAM(s) Oral daily  propofol Infusion 30 MICROgram(s)/kG/Min (16.3 mL/Hr) IV Continuous <Continuous>  senna 2 Tablet(s) Oral at bedtime  simvastatin 40 milliGRAM(s) Oral at bedtime  vasopressin Infusion 0.04 Unit(s)/Min (2.4 mL/Hr) IV Continuous <Continuous>    Vital Signs Last 24 Hrs  T(C): 37.6 (22 Dec 2020 11:28), Max: 39.6 (22 Dec 2020 01:30)  T(F): 99.6 (22 Dec 2020 11:28), Max: 103.3 (22 Dec 2020 01:30)  HR: 71 (22 Dec 2020 12:50) (45 - 120)  BP: 96/80 (22 Dec 2020 12:50) (50/34 - 155/49)  BP(mean): 84 (22 Dec 2020 12:50) (49 - 84)  RR: 28 (22 Dec 2020 12:50) (28 - 29)  SpO2: 82% (22 Dec 2020 12:30) (79% - 90%)    Constitutional: frail looking, intubated  HEENT: NC/AT, EOMI, PERRLA, conjunctivae clear  Neck: supple; thyroid not palpable  Back: no tenderness  Respiratory: decreased breath sounds, crackles   Cardiovascular: S1S2 regular, no murmurs  Abdomen: soft, not tender, not distended, positive BS  Genitourinary: no suprapubic tenderness  Lymphatic: no LN palpable  Musculoskeletal: no muscle tenderness, no joint swelling or tenderness  Extremities: no pedal edema  Neurological/ Psychiatric:  sedated/intubated; confused  Skin: no rashes; no palpable lesions    Labs: reviewed                                              10.9   23.37 )-----------( 502      ( 22 Dec 2020 05:30 )             36.0         125<L>  |  91<L>  |  87<H>  ----------------------------<  157<H>  6.5<HH>   |  16<L>  |  4.02<H>    Ca    7.9<L>      22 Dec 2020 05:30  Phos  13.7       Mg     3.1           Ferritin, Serum: 1156 ng/mL (20 @ 06:21)  C-Reactive Protein, Serum: 34.51 mg/dL (20 @ 06:21)  D-Dimer Assay, Quantitative: 1799 ng/mL DDU (20 @ 06:21)  Ferritin, Serum: 521 ng/mL (20 @ 13:43)  C-Reactive Protein, Serum: 15.70 mg/dL (20 @ 09:21)  D-Dimer Assay, Quantitative: 303 ng/mL DDU (20 @ 09:21)  Ferritin, Serum: 529 ng/mL (20 @ 09:21)                 Urinalysis Basic - ( 12 Dec 2020 09:45 )    Color: Yellow / Appearance: very cloudy / S.025 / pH: x  Gluc: x / Ketone: Large  / Bili: Negative / Urobili: 1 mg/dL   Blood: x / Protein: 500 mg/dL / Nitrite: Positive   Leuk Esterase: Small / RBC: >50 /HPF / WBC 26-50   Sq Epi: x / Non Sq Epi: Moderate / Bacteria: Many      Culture - Urine (collected 12 Dec 2020 09:45)  Source: .Urine Clean Catch (Midstream)  Final Report (14 Dec 2020 15:04):    >100,000 CFU/ml Escherichia coli  Organism: Escherichia coli (14 Dec 2020 15:04)  Organism: Escherichia coli (14 Dec 2020 15:04)      -  Amikacin: S <=16      -  Amoxicillin/Clavulanic Acid: S <=8/4      -  Ampicillin: S <=8 These ampicillin results predict results for amoxicillin      -  Ampicillin/Sulbactam: S <=4/2 Enterobacter, Citrobacter, and Serratia may develop resistance during prolonged therapy (3-4 days)      -  Aztreonam: S <=4      -  Cefazolin: S <=2 (MIC_CL_COM_ENTERIC_CEFAZU) For uncomplicated UTI with K. pneumoniae, E. coli, or P. mirablis: SANCHO <=16 is sensitive and SANCHO >=32 is resistant. This also predicts results for oral agents cefaclor, cefdinir, cefpodoxime, cefprozil, cefuroxime axetil, cephalexin and locarbef for uncomplicated UTI. Note that some isolates may be susceptible to these agents while testing resistant to cefazolin.      -  Cefepime: S <=2      -  Cefoxitin: S <=8      -  Ceftriaxone: S <=1 Enterobacter, Citrobacter, and Serratia may develop resistance during prolonged therapy      -  Ciprofloxacin: S <=0.25      -  Ertapenem: S <=0.5      -  Gentamicin: S <=2      -  Imipenem: S <=1      -  Levofloxacin: S <=0.5      -  Meropenem: S <=1      -  Nitrofurantoin: S <=32 Should not be used to treat pyelonephritis      -  Piperacillin/Tazobactam: S <=8      -  Tigecycline: S <=2      -  Tobramycin: S <=2      -  Trimethoprim/Sulfamethoxazole: S <=0.5/9.5      Method Type: SANCHO    Culture - Blood (collected 12 Dec 2020 09:21)  Source: .Blood Blood-Peripheral  Gram Stain (13 Dec 2020 04:42):    Growth in aerobic bottle: Gram positive cocci in pairs  Final Report (14 Dec 2020 08:53):    Growth in aerobic bottle: Staphylococcus hominis    Coag Negative Staphylococcus  Organism: Blood Culture PCR (14 Dec 2020 08:53)      -  Coagulase negative Staphylococcus: Detec      Method Type: PCR    Culture - Blood (collected 12 Dec 2020 09:21)  Source: .Blood Blood-Peripheral  Gram Stain (13 Dec 2020 19:38):    Growth in anaerobic bottle: Gram Positive Cocci in Clusters  Final Report (15 Dec 2020 21:13):    Growth in anaerobic bottle: Staphylococcus hominis  Organism: Staphylococcus hominis (15 Dec 2020 21:13)  Organism: Staphylococcus hominis (15 Dec 2020 21:13)      -  Ampicillin/Sulbactam: S <=8/4      -  Cefazolin: S <=4      -  Clindamycin: S <=0.25      -  Erythromycin: R >4      -  Gentamicin: S <=1 Should not be used as monotherapy      -  Oxacillin: S <=0.25      -  RIF- Rifampin: S <=1 Should not be used as monotherapy      -  Tetra/Doxy: S <=1      -  Trimethoprim/Sulfamethoxazole: R >2/38      -  Vancomycin: S 1      Method Type: SANCHO        Radiology: all available radiological tests reviewed  < from: Xray Chest 1 View- PORTABLE-Urgent (20 @ 10:28) >    EXAM:  XR CHEST PORTABLE URGENT 1V                            PROCEDURE DATE:  2020          INTERPRETATION:  AP chest on 2020 at 10:22 AM. Patient has sepsis, cough, and shortness of breath.    COMPARISON: None available.    Heart may be enlarged.    There is a slight infiltrate in the left midlung field and possibly another in the retrocardiac area.    Small infiltrate off right upper hilum is also possible.    IMPRESSION: Small infiltrates as above.      < from: Xray Chest 1 View- PORTABLE-Routine (Xray Chest 1 View- PORTABLE-Routine .) (20 @ 12:57) >  The lungs show similar unchanged bilateral  scattered and diffuse multifocal ill-defined airspace consolidations. No pneumothorax.    < end of copied text >        Advanced directives addressed: full resuscitation

## 2020-12-22 NOTE — PROGRESS NOTE ADULT - GASTROINTESTINAL DETAILS
soft/nontender
soft/nontender/no guarding/no rigidity
soft/nontender
soft/nontender
soft/nontender/no guarding/no rigidity
soft/nontender/no guarding/no rigidity
soft/nontender

## 2020-12-22 NOTE — PROGRESS NOTE ADULT - ASSESSMENT
IMP:    71 y/o female pt of size with HTN, DM, HL, cognitive impairment and GALE/OHS admitted with viral PNA secondary to COVID  Acute type 1 resp failure  ARDS  Septic shock  STAS (KDIGO 3) related to ATN--baseline Cr < 1.0  on RRT  E. coli UTI on CTX--completed 5 days of Abx  Severe Prot [place] malnutrition     OGT placed on 12/15, tip below the diaphragm for meds/nutrition     High risk for acute decompensation and deterioration including death.  Critically ill     As d/w Caitlin and family-- CPR would be medically futile while on multiple pressors so CPR would not be performed if arrest and they understand     Plan:    Full vent support--LPV strategy to maintain plateau pressures < 30--High PEEP/low TV--Permissive hypercapnea and acidemia. No prone--not responsive  Prop and fent gtt.  Cont Cis gtt   Mark (4) for possible superimposed bacterial infection   RRT today  Actively titrate pressors to keep MAP > 60  TF   HOB > 30  Bowel regime--Added senna   Cont with Rem/Deca (6)--Will stop Rem given STAS--will taper down steroids starting today  CTX (5)--stop after today dose  FS with insulin coverage--keep FS < 180.  Lantus stopped.  Will adjust as needed  DVT prophy--SCD and stop LMWH--start sqhep 7500 q 8    CCU care-- d/w ICU staff on multi disciplinary rounds

## 2020-12-22 NOTE — PROGRESS NOTE ADULT - SUBJECTIVE AND OBJECTIVE BOX
Patient is a 70y Female who has intermittent vt, amio added. Vaso as well. HD tolerated with icu support    REVIEW OF SYSTEMS:    UTO,intbuated    MEDICATIONS  (STANDING):  aMIOdarone Infusion 1 mG/Min (33.3 mL/Hr) IV Continuous <Continuous>  aMIOdarone Infusion 0.5 mG/Min (16.7 mL/Hr) IV Continuous <Continuous>  aspirin  chewable 81 milliGRAM(s) Oral daily  chlorhexidine 0.12% Liquid 15 milliLiter(s) Oral Mucosa every 12 hours  cisatracurium Infusion 2 MICROgram(s)/kG/Min (10.9 mL/Hr) IV Continuous <Continuous>  dexAMETHasone  IVPB 8 milliGRAM(s) IV Intermittent daily  dextrose 40% Gel 15 Gram(s) Oral once  dextrose 5%. 1000 milliLiter(s) (50 mL/Hr) IV Continuous <Continuous>  dextrose 50% Injectable 25 Gram(s) IV Push once  famotidine    Tablet 20 milliGRAM(s) Oral daily  fentaNYL   Infusion... 0.5 MICROgram(s)/kG/Hr (2.27 mL/Hr) IV Continuous <Continuous>  glucagon  Injectable 1 milliGRAM(s) IntraMuscular once  heparin   Injectable 7500 Unit(s) SubCutaneous every 8 hours  insulin lispro (ADMELOG) corrective regimen sliding scale   SubCutaneous every 6 hours  lamoTRIgine 100 milliGRAM(s) Oral two times a day  meropenem  IVPB 500 milliGRAM(s) IV Intermittent every 24 hours  midodrine 5 milliGRAM(s) Oral every 8 hours  norepinephrine Infusion 0.05 MICROgram(s)/kG/Min (4.25 mL/Hr) IV Continuous <Continuous>  PARoxetine 20 milliGRAM(s) Oral daily  polyethylene glycol 3350 17 Gram(s) Oral daily  primidone 50 milliGRAM(s) Oral daily  primidone 150 milliGRAM(s) Oral at bedtime  primidone 250 milliGRAM(s) Oral daily  propofol Infusion 30 MICROgram(s)/kG/Min (16.3 mL/Hr) IV Continuous <Continuous>  senna 2 Tablet(s) Oral at bedtime  simvastatin 40 milliGRAM(s) Oral at bedtime  vasopressin Infusion 0.04 Unit(s)/Min (2.4 mL/Hr) IV Continuous <Continuous>    MEDICATIONS  (PRN):  acetaminophen   Tablet .. 650 milliGRAM(s) Oral every 4 hours PRN Temp greater or equal to 38.5C (101.3F)  ALBUTerol    90 MICROgram(s) HFA Inhaler 2 Puff(s) Inhalation every 4 hours PRN Shortness of Breath and/or Wheezing  LORazepam   Injectable 2 milliGRAM(s) IV Push every 4 hours PRN Agitation        T(C): , Max: 39.6 (12-22-20 @ 01:30)  T(F): , Max: 103.3 (12-22-20 @ 01:30)  HR: 90 (12-22-20 @ 09:30)  BP: 113/50 (12-22-20 @ 09:30)  BP(mean): 65 (12-22-20 @ 09:30)  RR: 28 (12-22-20 @ 09:30)  SpO2: 81% (12-22-20 @ 09:30)  Wt(kg): --    12-21 @ 07:01  -  12-22 @ 07:00  --------------------------------------------------------  IN: 2360.8 mL / OUT: 25 mL / NET: 2335.8 mL          PHYSICAL EXAM:CP    Constitutional: frail  HEENT:MM  Neck: No LAD, No JVD  Respiratory: ronchi  Cardiovascular: S1 and S2  Extremities: No peripheral edema  Neurological: int  :  Miles  Skin: No rashes  Access: temp cath, tlc        LABS:                        10.9   23.37 )-----------( 502      ( 22 Dec 2020 05:30 )             36.0     22 Dec 2020 05:30    125    |  91     |  87     ----------------------------<  157    6.5     |  16     |  4.02   21 Dec 2020 06:30    129    |  91     |  113    ----------------------------<  74     5.7     |  24     |  3.93   20 Dec 2020 06:21    133    |  96     |  97     ----------------------------<  83     4.9     |  26     |  2.74   19 Dec 2020 06:00    136    |  100    |  82     ----------------------------<  123    4.9     |  29     |  1.89     Ca    7.9        22 Dec 2020 05:30  Ca    8.2        21 Dec 2020 06:30  Ca    8.3        20 Dec 2020 06:21  Ca    8.4        19 Dec 2020 06:00  Phos  13.7      22 Dec 2020 05:30  Phos  10.9      21 Dec 2020 06:30  Phos  8.7       20 Dec 2020 06:21  Phos  6.9       19 Dec 2020 06:00  Mg     3.1       22 Dec 2020 05:30  Mg     3.4       21 Dec 2020 06:30  Mg     3.2       20 Dec 2020 06:21  Mg     3.1       19 Dec 2020 06:00    TPro  6.8    /  Alb  1.5    /  TBili  0.7    /  DBili  0.6    /  AST  71     /  ALT  69     /  AlkPhos  119    19 Dec 2020 06:00      Hepatitis C Virus S/CO Ratio: 0.14 S/CO [0.00 - 0.99] (12-21 @ 12:00)  Hepatitis C Virus Interpretation: Nonreact (12-21 @ 12:00)      Urine Studies:          RADIOLOGY & ADDITIONAL STUDIES:

## 2020-12-22 NOTE — DISCHARGE NOTE FOR THE EXPIRED PATIENT - SECONDARY DIAGNOSIS.
COVID-19 Septic shock Acute respiratory failure with hypoxemia ARDS (adult respiratory distress syndrome) STAS (acute kidney injury)

## 2020-12-22 NOTE — PROGRESS NOTE ADULT - COMMENTS
ROS o/w negative
ROS o/w negative
Unobtainable due to clinical condition

## 2020-12-22 NOTE — PROVIDER CONTACT NOTE (CRITICAL VALUE NOTIFICATION) - TEST AND RESULT REPORTED:
pH 7.08
positive Blood cultures. Anaerobic gram positive cocci in clusters
preliminary report/ positive blood culture
potassium 6.5

## 2020-12-22 NOTE — CHART NOTE - NSCHARTNOTEFT_GEN_A_CORE
Death certificate completed online.  Case # [id number]
HPI:    69 y/o female pt of size with HTN, DM, HL, cognitive impairment and GALE/OHS presents with complaints of 1 week onset of productive cough, weakness, myalgia, lethargy, diarrhea and shortness of breath. Daughter and  both have covid and live with her. Daughter is currently hospitalized for COVID PNA. Intubated today 2/2 increasing hypoxia. Pending ABG possibly need to start to prone. Pt prior to intubation with suboptimal nutrition x 11 days total (including 7 days PTA).  Sedation: Propofol infusing and providing ~ 450calories (included in daily caloric needs). Nimbex for proning. Hypokalemia noted. Pt to be TF while proned and in reverse Trendelenburg position at 10-25 degrees to prevent aspiration.     *Pt currently meets criteria for severe protein/calorie malnutrition in context of acute illness AEB suboptimal intake to meet nutritional needs 2/2 COVID.   SYMPTOMS: poor intake x 11 days, +1 edema +2 edema.  GOALS: Tolerance of enteral feeds which will meet >80% of ENN.       Labs: 12-15 Na142 mmol/L Glu 263 mg/dL<H> K+ 3.3 mmol/L<L> Cr  0.69 mg/dL BUN 28 mg/dL<H> 12-15 Phos 3.5 mg/dL 12-15 Alb 2.3 g/dL<L>    POCT Blood Glucose.: 219 mg/dL (15 Dec 2020 12:22)  POCT Blood Glucose.: 243 mg/dL (15 Dec 2020 07:31)  POCT Blood Glucose.: 391 mg/dL (14 Dec 2020 21:52)  POCT Blood Glucose.: 255 mg/dL (14 Dec 2020 16:49)        Skin: kyrie score = 13 (high risk for skin breakdown)  Edema-+1 R/L foot and ankles, appears to have generalized edema        Diet Presciption: NPO       Wt Hx: 12/15- 212.9#   Height (cm): 160 (12-12-20 @ 09:14)  Weight (kg): 90.7 (12-12-20 @ 09:14)  BMI (kg/m2): 35.4 (12-12-20 @ 09:14)        Weight Used for Calculation: Adjusted body weight used for energy/hydration needs- 65Kg and IBW used for protein needs 54.5Kg     Estimated Energy Needs (25-30 calories/kg): 6043-8476 calories  Estimated Protein Needs ( 1.6-2.0 gm/kg): 87-109gm protein   Estimated Fluid Needs (25-30 ml/kg): 1627-1950ml         Recommendations:      1) Glucerna 1.5 @ 35ml/hr (total volume 700ml) + 4 packs of Prosource TF daily (total provided daily including sedation calories= 1665 calories/ 102gm protein/ 530ml free water in formula)  2) Additional water bolus 150ml TID (total volume 450ml)  3) Maintain aspiration precautions, back of bed >35 degrees. (Prone position continue with TF infusing with patient in reverse Trendelenburg position at 10-25 degrees)  4) monitor weights daily  5) monitor lytes/labs/hydration replete as needed  6) Strict I & O's           ***Will continue to monitor and follow up PRN***
HPI:69 y/o female pt of size with HTN, DM, HL, cognitive impairment and GALE/OHS presents with complaints of 1 week onset of productive cough, weakness, myalgia, lethargy, diarrhea and shortness of breath. Daughter and  both have COVID and live with her. Daughter is currently hospitalized for COVID/PNA.  Hospital Day #7, CCU Day# 6, Vent Day #4    Pt proned however poor response. On pressor-Norepi 0.08. Renal function declining (Creatinine ^). As per Intensivist pt is not doing well.   Pt remains with severe protein/calorie malnutrition diagnosis.     #Last BM documented- 12/16- x 3 days ago. Meds: Miralax. Suggest additional BM meds to decrease risk of constipation and TF intolerance.        Labs: 12-19 Na136 mmol/L Glu 123 mg/dL<H> K+ 4.9 mmol/L Cr  1.89 mg/dL<H> BUN 82 mg/dL<H> 12-19 Phos 6.9 mg/dL<H> 12-19 Alb 1.5 g/dL<L>  Renal function declining- not on RRT at this time.      POCT Blood Glucose.: 104 mg/dL (19 Dec 2020 11:23)  POCT Blood Glucose.: 135 mg/dL (19 Dec 2020 06:00)  POCT Blood Glucose.: 224 mg/dL (18 Dec 2020 22:44)  POCT Blood Glucose.: 192 mg/dL (18 Dec 2020 16:49)        Skin: kyrie score = 10 (high risk for skin breakdown)  Edema- +1 generalized edema        Diet Presciption: Diet, NPO with Tube Feed:   Tube Feeding Modality: Orogastric  Nepro with Carb Steady (NEPRORTH)  Total Volume for 24 Hours (mL): 720  Continuous  Starting Tube Feed Rate {mL per Hour}: 30  Until Goal Tube Feed Rate (mL per Hour): 30  Tube Feed Duration (in Hours): 24  Tube Feed Start Time: 00:00  No Carb Prosource TF     Qty per Day:  4 (12-19-20 @ 11:38)      Wt Hx: 12/16- 209.6#  12/14- 205.4#  4#/2% significant weight gain x 2 days noted. Weight gain secondary to fluid accumulation and suboptimal nutrition.     Height (cm): 160 (12-12-20 @ 09:14)  Weight (kg): 90.7 (12-12-20 @ 09:14)  BMI (kg/m2): 35.4 (12-12-20 @ 09:14)        Weight Used for Calculation: Adjusted body weight used for energy/hydration needs- 65Kg and IBW used for protein needs 54.5Kg     Estimated Energy Needs (25-30 calories/kg): 4779-0875 calories  Estimated Protein Needs ( 1.6-2.0 gm/kg): 87-109gm protein   Estimated Fluid Needs (25-30 ml/kg): 1627-1950ml         Recommendations:  1) Change TF to Nepro @ 30cc/hr (total volume 600ml) + 4 packs of Prosource TF daily ( total provided inc. calories from sedation- 1545 calories/ 93gm protein/ 435ml free water from enteral formula)  2) monitor TF tolerance; when supine, keep back of bed > 35 degrees; when prone, place bed in reverse Trendelenburg position @ 10-20 degrees.   3) monitor labs/lytes and hydration replete as needed  4) monitor daily weights   5) strict I & O's                 ***Will continue to monitor and follow up PRN***
Notified by floor RN, patient found to have positive blood culture: Aerobic bottle , Gram positive cocci. Will give One dose of 1 gram Vancomycin now, patient is also already on Ceftriaxone. ID service is already consulted.
Patient's  Guevara at [phone] and daughter Caitlin [phone] called and updated on todays events.  Patient teetering on BiPAP 100%, spo2 maintaining in the mid to high 80s. Low dose morphine 2mg IVP ordered, 1mg IVP at a time.  Patient is at extremely high risk for respiratory decompensation.  Family is aware.
Responded to code blue    Pt with worsening hypoxia--more confused--on arrival O2 sat 60s.  Anesthesia at bedside to assist with intubation  Hypotension post intubation  Emergent but sterile CVL and A-line placed   Vent 28/400/100/+ 20  Will check ABG--will likely need prone  Start TF now  Cont with other Rx    IMP    Acute type 1 resp failure  ARDS    Plan:    As above    Above d/w Caitlin -- All concerns addressed including but not limited to diagnosis, treatment plan and overall prognosis       Add'l 45 cc mins excluding procedure
Spoke with  roseann  and updated on current condition-- All concerns addressed including but not limited to diagnosis, treatment plan and overall prognosis
Spoke with  roseann via phone and updated him on her condition-- All concerns addressed including but not limited to diagnosis, treatment plan and overall prognosis
Spoke with Caitlin via phone-- All concerns addressed including but not limited to diagnosis, treatment plan and overall prognosis
Spoke with Caitlin via phone--[phone]--updated on her condition and decline during RRT.  They are planning on coming in to visit today-- All concerns addressed including but not limited to diagnosis, treatment plan and overall prognosis
Spoke with Caitlin via phone--[phone].  Updated on condition.  All concerns addressed including but not limited to diagnosis, treatment plan and overall prognosis
Spoke with Caitlin via phone--updated her on pt's condition-- upon asked regarding overall prognosis--I told her mortality is close to 90%.  Emotional support provided and All concerns addressed including but not limited to diagnosis, treatment plan and overall prognosis
Spoke with daughter Caitlin, [phone]-- All concerns addressed including but not limited to diagnosis, treatment plan and overall prognosis  Updated on my concern for high prob intubation
Spoke with Caitlin via phone--updated on acuity of pt--very high mortality--started on RRT today-- All concerns addressed including but not limited to diagnosis, treatment plan and overall prognosis

## 2020-12-22 NOTE — PROGRESS NOTE ADULT - MS EXT PE MLT D E PC
no cyanosis/no pedal edema
no cyanosis/pedal edema
pedal edema
no cyanosis/no pedal edema
no pedal edema/no cyanosis
Dusky extreme/no pedal edema
pedal edema
no cyanosis/no pedal edema
no cyanosis/pedal edema

## 2020-12-22 NOTE — PROGRESS NOTE ADULT - SUBJECTIVE AND OBJECTIVE BOX
Hospital # 10  CCU # 9  Vent # 7  L SCV CVL # 7  R fem A-line # 2--REMOVED.  R Rad A-line # 5  [facility] HD Cath # 1    CC:  COVID    HPI:    71 y/o female pt of size with HTN, DM, HL, cognitive impairment and GALE/OHS presents with complaints of 1 week onset of productive cough, weakness, myalgia, lethargy, diarrhea and shortness of breath. Daughter and  both have covid and live with her. Daughter is currently hospitalized for covid pna    12/14:  Pt seen and examined in CCU--on BIPAP--O2 sat high 80s--Awake--increased WOB--able to speak in full sentences.  FS not well controlled.     12/15:  Cannot tolerate being off BIPAP.  Not eating.  Awake and alert.  Mod WOB.  O2 sat mid 80s    12/16:  Above noted.  emergently intubated yesterday.  On prop and cis gtts. No pressors.  Proned.  Ppressure 34    12/17:  Vented--Ppressure > 30.  On prop and cis gtts.  Tm 102.7  FS still high     12/18:  Vented--sedated.  65%/+ 16.  Meets criteria for prone    12/19:  Prone--did not really response to maneuver.  100%/+ 18.  On pressors--Norepi 0.08.  Cr rising.  Not doing well    12/20:  Not doing well.  Worsening renal failure.  on 100% + 18.  Pp 32. Tm 103.2  BM few days ago.  On pressors    12/21:  Doing very poorly.  Hypoxic on 100% + 20.  Asynchrony with Vent.  Remains on Norepi gtt.  No u/o.  Hypoglycemic O/N.  Does not respond to prone.  Case d/w Dr Leach--will initiate RRT today    12/22:  Case d/w [facility] Segura.  VT O/N started on IV Amio.  Vented and sedated.  RRT today.  On Norepi and vaso gtt.  Sat 80s.  Tm 103.3 Doing Poorly    PMH:  As above.     PSH:  As above.     FH: Non Contributory other than those listed in HPI    Social History:  Unobtainable due to clinical condition     MEDICATIONS  (STANDING):  aMIOdarone Infusion 1 mG/Min (33.3 mL/Hr) IV Continuous <Continuous>  aMIOdarone Infusion 0.5 mG/Min (16.7 mL/Hr) IV Continuous <Continuous>  aspirin  chewable 81 milliGRAM(s) Oral daily  chlorhexidine 0.12% Liquid 15 milliLiter(s) Oral Mucosa every 12 hours  cisatracurium Infusion 2 MICROgram(s)/kG/Min (10.9 mL/Hr) IV Continuous <Continuous>  dexAMETHasone  IVPB 8 milliGRAM(s) IV Intermittent daily  dextrose 40% Gel 15 Gram(s) Oral once  dextrose 5%. 1000 milliLiter(s) (50 mL/Hr) IV Continuous <Continuous>  dextrose 50% Injectable 25 Gram(s) IV Push once  famotidine    Tablet 20 milliGRAM(s) Oral daily  fentaNYL   Infusion... 0.5 MICROgram(s)/kG/Hr (2.27 mL/Hr) IV Continuous <Continuous>  glucagon  Injectable 1 milliGRAM(s) IntraMuscular once  heparin   Injectable 7500 Unit(s) SubCutaneous every 8 hours  insulin lispro (ADMELOG) corrective regimen sliding scale   SubCutaneous every 6 hours  lamoTRIgine 100 milliGRAM(s) Oral two times a day  meropenem  IVPB 500 milliGRAM(s) IV Intermittent every 24 hours  midodrine 5 milliGRAM(s) Oral every 8 hours  norepinephrine Infusion 0.05 MICROgram(s)/kG/Min (4.25 mL/Hr) IV Continuous <Continuous>  PARoxetine 20 milliGRAM(s) Oral daily  polyethylene glycol 3350 17 Gram(s) Oral daily  primidone 50 milliGRAM(s) Oral daily  primidone 150 milliGRAM(s) Oral at bedtime  primidone 250 milliGRAM(s) Oral daily  propofol Infusion 30 MICROgram(s)/kG/Min (16.3 mL/Hr) IV Continuous <Continuous>  senna 2 Tablet(s) Oral at bedtime  simvastatin 40 milliGRAM(s) Oral at bedtime  vasopressin Infusion 0.04 Unit(s)/Min (2.4 mL/Hr) IV Continuous <Continuous>    MEDICATIONS  (PRN):  acetaminophen   Tablet .. 650 milliGRAM(s) Oral every 4 hours PRN Temp greater or equal to 38.5C (101.3F)  ALBUTerol    90 MICROgram(s) HFA Inhaler 2 Puff(s) Inhalation every 4 hours PRN Shortness of Breath and/or Wheezing  LORazepam   Injectable 2 milliGRAM(s) IV Push every 4 hours PRN Agitation      Allergies: NKDA    ROS:  SEE BELOW        ICU Vital Signs Last 24 Hrs  T(C): 36.7 (22 Dec 2020 08:00), Max: 39.6 (22 Dec 2020 01:30)  T(F): 98.1 (22 Dec 2020 08:00), Max: 103.3 (22 Dec 2020 01:30)  HR: 90 (22 Dec 2020 08:30) (64 - 128)  BP: 111/52 (22 Dec 2020 08:30) (83/45 - 137/38)  BP(mean): 66 (22 Dec 2020 08:30) (54 - 76)  ABP: 90/52 (22 Dec 2020 08:30) (70/53 - 130/66)  ABP(mean): 66 (22 Dec 2020 08:30) (49 - 85)  RR: 28 (22 Dec 2020 08:30) (28 - 36)  SpO2: 79% (22 Dec 2020 08:30) (62% - 90%)      Mode: AC/ CMV (Assist Control/ Continuous Mandatory Ventilation)  RR (machine): 28  TV (machine): 350  FiO2: 100  PEEP: 20  ITime: 1  MAP: 28  PIP: 42      I&O's Summary    21 Dec 2020 07:01  -  22 Dec 2020 07:00  --------------------------------------------------------  IN: 2360.8 mL / OUT: 25 mL / NET: 2335.8 mL        Physical Exam:  SEE BELOW                          10.9   23.37 )-----------( 502      ( 22 Dec 2020 05:30 )             36.0       12-22    125<L>  |  91<L>  |  87<H>  ----------------------------<  157<H>  6.5<HH>   |  16<L>  |  4.02<H>    Ca    7.9<L>      22 Dec 2020 05:30  Phos  13.7     12-22  Mg     3.1     12-22              ABG - ( 22 Dec 2020 05:27 )  pH, Arterial: 7.08  pH, Blood: x     /  pCO2: 55    /  pO2: 60    / HCO3: 16    / Base Excess: -14.1 /  SaO2: 83                      DVT Prophylaxis:                                                            Contraindication:     Advanced Directives:    Discussed with:    Visit Information:  Time spent excluding procedure:  75 cc mins    ** Time is exclusive of billed procedures and/or teaching and/or routine family updates.

## 2021-01-04 DIAGNOSIS — R25.1 TREMOR, UNSPECIFIED: ICD-10-CM

## 2021-01-04 DIAGNOSIS — E87.1 HYPO-OSMOLALITY AND HYPONATREMIA: ICD-10-CM

## 2021-01-04 DIAGNOSIS — E87.2 ACIDOSIS: ICD-10-CM

## 2021-01-04 DIAGNOSIS — E11.65 TYPE 2 DIABETES MELLITUS WITH HYPERGLYCEMIA: ICD-10-CM

## 2021-01-04 DIAGNOSIS — G31.84 MILD COGNITIVE IMPAIRMENT OF UNCERTAIN OR UNKNOWN ETIOLOGY: ICD-10-CM

## 2021-01-04 DIAGNOSIS — U07.1 COVID-19: ICD-10-CM

## 2021-01-04 DIAGNOSIS — E78.5 HYPERLIPIDEMIA, UNSPECIFIED: ICD-10-CM

## 2021-01-04 DIAGNOSIS — E66.2 MORBID (SEVERE) OBESITY WITH ALVEOLAR HYPOVENTILATION: ICD-10-CM

## 2021-01-04 DIAGNOSIS — Z87.891 PERSONAL HISTORY OF NICOTINE DEPENDENCE: ICD-10-CM

## 2021-01-04 DIAGNOSIS — I25.2 OLD MYOCARDIAL INFARCTION: ICD-10-CM

## 2021-01-04 DIAGNOSIS — R65.21 SEVERE SEPSIS WITH SEPTIC SHOCK: ICD-10-CM

## 2021-01-04 DIAGNOSIS — B96.20 UNSPECIFIED ESCHERICHIA COLI [E. COLI] AS THE CAUSE OF DISEASES CLASSIFIED ELSEWHERE: ICD-10-CM

## 2021-01-04 DIAGNOSIS — E43 UNSPECIFIED SEVERE PROTEIN-CALORIE MALNUTRITION: ICD-10-CM

## 2021-01-04 DIAGNOSIS — N39.0 URINARY TRACT INFECTION, SITE NOT SPECIFIED: ICD-10-CM

## 2021-01-04 DIAGNOSIS — I10 ESSENTIAL (PRIMARY) HYPERTENSION: ICD-10-CM

## 2021-01-04 DIAGNOSIS — A41.89 OTHER SPECIFIED SEPSIS: ICD-10-CM

## 2021-01-04 DIAGNOSIS — J12.89 OTHER VIRAL PNEUMONIA: ICD-10-CM

## 2021-01-04 DIAGNOSIS — E11.649 TYPE 2 DIABETES MELLITUS WITH HYPOGLYCEMIA WITHOUT COMA: ICD-10-CM

## 2021-01-04 DIAGNOSIS — J80 ACUTE RESPIRATORY DISTRESS SYNDROME: ICD-10-CM

## 2021-01-04 DIAGNOSIS — E87.5 HYPERKALEMIA: ICD-10-CM

## 2021-01-04 DIAGNOSIS — N17.0 ACUTE KIDNEY FAILURE WITH TUBULAR NECROSIS: ICD-10-CM

## 2021-01-04 DIAGNOSIS — E83.39 OTHER DISORDERS OF PHOSPHORUS METABOLISM: ICD-10-CM

## 2021-05-05 NOTE — HISTORY OF PRESENT ILLNESS
Returned pt's phone call.  He states that he is able to have a chest xray and PFTs on 5/12, and he is going to work on scheduling labs for sometime next week. Rescheduled pt's virtual clinic visit to 5/20.    [FreeTextEntry1] : The patient is a 69-year-old woman who was seen in the office today for the above. At last visit, she is treated with Macrobid for one week and stated that her enuresis is the same. She never has any symptoms with UTI. Urine culture at the last visit groove greater than 100,000 colonies of Escherichia coli sensitive to Macrobid.\par The daytime frequency is the same at 6 times a day without nocturia and she denies all other urological and constitutional symptomatology.\par \par Her past medical history, surgical history, medication history and allergy history are unchanged.
